# Patient Record
Sex: FEMALE | Race: OTHER | HISPANIC OR LATINO | URBAN - METROPOLITAN AREA
[De-identification: names, ages, dates, MRNs, and addresses within clinical notes are randomized per-mention and may not be internally consistent; named-entity substitution may affect disease eponyms.]

---

## 2023-03-17 ENCOUNTER — INPATIENT (INPATIENT)
Facility: HOSPITAL | Age: 83
LOS: 9 days | Discharge: NURSING, REHAB OR LONG-TERM CARE | End: 2023-03-27
Attending: INTERNAL MEDICINE | Admitting: INTERNAL MEDICINE
Payer: MEDICARE

## 2023-03-17 VITALS
HEART RATE: 75 BPM | RESPIRATION RATE: 16 BRPM | DIASTOLIC BLOOD PRESSURE: 80 MMHG | SYSTOLIC BLOOD PRESSURE: 150 MMHG | OXYGEN SATURATION: 94 % | TEMPERATURE: 98 F

## 2023-03-17 DIAGNOSIS — G93.41 METABOLIC ENCEPHALOPATHY: ICD-10-CM

## 2023-03-17 DIAGNOSIS — Z98.890 OTHER SPECIFIED POSTPROCEDURAL STATES: Chronic | ICD-10-CM
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
## 2023-03-17 DIAGNOSIS — R53.1 WEAKNESS: ICD-10-CM

## 2023-03-17 DIAGNOSIS — Z90.3 ACQUIRED ABSENCE OF STOMACH [PART OF]: Chronic | ICD-10-CM

## 2023-03-17 DIAGNOSIS — I10 ESSENTIAL (PRIMARY) HYPERTENSION: ICD-10-CM

## 2023-03-17 DIAGNOSIS — F41.9 ANXIETY DISORDER, UNSPECIFIED: ICD-10-CM

## 2023-03-17 DIAGNOSIS — Z29.9 ENCOUNTER FOR PROPHYLACTIC MEASURES, UNSPECIFIED: ICD-10-CM

## 2023-03-17 DIAGNOSIS — E87.1 HYPO-OSMOLALITY AND HYPONATREMIA: ICD-10-CM

## 2023-03-17 DIAGNOSIS — N39.0 URINARY TRACT INFECTION, SITE NOT SPECIFIED: ICD-10-CM

## 2023-03-17 LAB
ALBUMIN SERPL ELPH-MCNC: 4.1 G/DL — SIGNIFICANT CHANGE UP (ref 3.3–5)
ALP SERPL-CCNC: 43 U/L — SIGNIFICANT CHANGE UP (ref 40–120)
ALT FLD-CCNC: 33 U/L — SIGNIFICANT CHANGE UP (ref 4–33)
ANION GAP SERPL CALC-SCNC: 13 MMOL/L — SIGNIFICANT CHANGE UP (ref 7–14)
APPEARANCE UR: ABNORMAL
APTT BLD: 30.6 SEC — SIGNIFICANT CHANGE UP (ref 27–36.3)
AST SERPL-CCNC: 39 U/L — HIGH (ref 4–32)
BASOPHILS # BLD AUTO: 0.02 K/UL — SIGNIFICANT CHANGE UP (ref 0–0.2)
BASOPHILS NFR BLD AUTO: 0.2 % — SIGNIFICANT CHANGE UP (ref 0–2)
BILIRUB SERPL-MCNC: 0.8 MG/DL — SIGNIFICANT CHANGE UP (ref 0.2–1.2)
BILIRUB UR-MCNC: NEGATIVE — SIGNIFICANT CHANGE UP
BUN SERPL-MCNC: 14 MG/DL — SIGNIFICANT CHANGE UP (ref 7–23)
CALCIUM SERPL-MCNC: 9.5 MG/DL — SIGNIFICANT CHANGE UP (ref 8.4–10.5)
CHLORIDE SERPL-SCNC: 92 MMOL/L — LOW (ref 98–107)
CO2 SERPL-SCNC: 27 MMOL/L — SIGNIFICANT CHANGE UP (ref 22–31)
COLOR SPEC: YELLOW — SIGNIFICANT CHANGE UP
CREAT SERPL-MCNC: 0.75 MG/DL — SIGNIFICANT CHANGE UP (ref 0.5–1.3)
DIFF PNL FLD: NEGATIVE — SIGNIFICANT CHANGE UP
EGFR: 79 ML/MIN/1.73M2 — SIGNIFICANT CHANGE UP
EOSINOPHIL # BLD AUTO: 0.05 K/UL — SIGNIFICANT CHANGE UP (ref 0–0.5)
EOSINOPHIL NFR BLD AUTO: 0.4 % — SIGNIFICANT CHANGE UP (ref 0–6)
FLUAV AG NPH QL: SIGNIFICANT CHANGE UP
FLUBV AG NPH QL: SIGNIFICANT CHANGE UP
GLUCOSE SERPL-MCNC: 116 MG/DL — HIGH (ref 70–99)
GLUCOSE UR QL: NEGATIVE — SIGNIFICANT CHANGE UP
HCT VFR BLD CALC: 46.2 % — HIGH (ref 34.5–45)
HGB BLD-MCNC: 15.7 G/DL — HIGH (ref 11.5–15.5)
IANC: 8.86 K/UL — HIGH (ref 1.8–7.4)
IMM GRANULOCYTES NFR BLD AUTO: 0.3 % — SIGNIFICANT CHANGE UP (ref 0–0.9)
INR BLD: 1.02 RATIO — SIGNIFICANT CHANGE UP (ref 0.88–1.16)
KETONES UR-MCNC: NEGATIVE — SIGNIFICANT CHANGE UP
LEUKOCYTE ESTERASE UR-ACNC: ABNORMAL
LYMPHOCYTES # BLD AUTO: 1.91 K/UL — SIGNIFICANT CHANGE UP (ref 1–3.3)
LYMPHOCYTES # BLD AUTO: 16.5 % — SIGNIFICANT CHANGE UP (ref 13–44)
MCHC RBC-ENTMCNC: 31 PG — SIGNIFICANT CHANGE UP (ref 27–34)
MCHC RBC-ENTMCNC: 34 GM/DL — SIGNIFICANT CHANGE UP (ref 32–36)
MCV RBC AUTO: 91.1 FL — SIGNIFICANT CHANGE UP (ref 80–100)
MONOCYTES # BLD AUTO: 0.71 K/UL — SIGNIFICANT CHANGE UP (ref 0–0.9)
MONOCYTES NFR BLD AUTO: 6.1 % — SIGNIFICANT CHANGE UP (ref 2–14)
NEUTROPHILS # BLD AUTO: 8.86 K/UL — HIGH (ref 1.8–7.4)
NEUTROPHILS NFR BLD AUTO: 76.5 % — SIGNIFICANT CHANGE UP (ref 43–77)
NITRITE UR-MCNC: NEGATIVE — SIGNIFICANT CHANGE UP
NRBC # BLD: 0 /100 WBCS — SIGNIFICANT CHANGE UP (ref 0–0)
NRBC # FLD: 0 K/UL — SIGNIFICANT CHANGE UP (ref 0–0)
PH UR: 8 — SIGNIFICANT CHANGE UP (ref 5–8)
PLATELET # BLD AUTO: 242 K/UL — SIGNIFICANT CHANGE UP (ref 150–400)
POTASSIUM SERPL-MCNC: 4.1 MMOL/L — SIGNIFICANT CHANGE UP (ref 3.5–5.3)
POTASSIUM SERPL-SCNC: 4.1 MMOL/L — SIGNIFICANT CHANGE UP (ref 3.5–5.3)
PROT SERPL-MCNC: 7.5 G/DL — SIGNIFICANT CHANGE UP (ref 6–8.3)
PROT UR-MCNC: ABNORMAL
PROTHROM AB SERPL-ACNC: 11.8 SEC — SIGNIFICANT CHANGE UP (ref 10.5–13.4)
RBC # BLD: 5.07 M/UL — SIGNIFICANT CHANGE UP (ref 3.8–5.2)
RBC # FLD: 12.3 % — SIGNIFICANT CHANGE UP (ref 10.3–14.5)
RSV RNA NPH QL NAA+NON-PROBE: SIGNIFICANT CHANGE UP
SARS-COV-2 RNA SPEC QL NAA+PROBE: SIGNIFICANT CHANGE UP
SODIUM SERPL-SCNC: 132 MMOL/L — LOW (ref 135–145)
SP GR SPEC: 1.01 — SIGNIFICANT CHANGE UP (ref 1.01–1.05)
UROBILINOGEN FLD QL: SIGNIFICANT CHANGE UP
WBC # BLD: 11.58 K/UL — HIGH (ref 3.8–10.5)
WBC # FLD AUTO: 11.58 K/UL — HIGH (ref 3.8–10.5)

## 2023-03-17 PROCEDURE — 70450 CT HEAD/BRAIN W/O DYE: CPT | Mod: 26,MD

## 2023-03-17 PROCEDURE — 72125 CT NECK SPINE W/O DYE: CPT | Mod: 26,MA

## 2023-03-17 PROCEDURE — 72170 X-RAY EXAM OF PELVIS: CPT | Mod: 26

## 2023-03-17 PROCEDURE — 99285 EMERGENCY DEPT VISIT HI MDM: CPT

## 2023-03-17 PROCEDURE — 71046 X-RAY EXAM CHEST 2 VIEWS: CPT | Mod: 26

## 2023-03-17 RX ORDER — CLONAZEPAM 1 MG
0.5 TABLET ORAL THREE TIMES A DAY
Refills: 0 | Status: DISCONTINUED | OUTPATIENT
Start: 2023-03-17 | End: 2023-03-20

## 2023-03-17 RX ORDER — METOPROLOL TARTRATE 50 MG
200 TABLET ORAL DAILY
Refills: 0 | Status: DISCONTINUED | OUTPATIENT
Start: 2023-03-17 | End: 2023-03-27

## 2023-03-17 RX ORDER — CEFTRIAXONE 500 MG/1
1000 INJECTION, POWDER, FOR SOLUTION INTRAMUSCULAR; INTRAVENOUS ONCE
Refills: 0 | Status: COMPLETED | OUTPATIENT
Start: 2023-03-17 | End: 2023-03-17

## 2023-03-17 RX ORDER — SODIUM CHLORIDE 9 MG/ML
1000 INJECTION INTRAMUSCULAR; INTRAVENOUS; SUBCUTANEOUS
Refills: 0 | Status: DISCONTINUED | OUTPATIENT
Start: 2023-03-17 | End: 2023-03-23

## 2023-03-17 RX ORDER — FLUOXETINE HCL 10 MG
20 CAPSULE ORAL DAILY
Refills: 0 | Status: DISCONTINUED | OUTPATIENT
Start: 2023-03-17 | End: 2023-03-27

## 2023-03-17 RX ORDER — HYDRALAZINE HCL 50 MG
10 TABLET ORAL THREE TIMES A DAY
Refills: 0 | Status: DISCONTINUED | OUTPATIENT
Start: 2023-03-17 | End: 2023-03-19

## 2023-03-17 RX ORDER — ACETAMINOPHEN 500 MG
650 TABLET ORAL ONCE
Refills: 0 | Status: COMPLETED | OUTPATIENT
Start: 2023-03-17 | End: 2023-03-17

## 2023-03-17 RX ORDER — METOPROLOL TARTRATE 50 MG
200 TABLET ORAL DAILY
Refills: 0 | Status: DISCONTINUED | OUTPATIENT
Start: 2023-03-17 | End: 2023-03-17

## 2023-03-17 RX ORDER — CEFTRIAXONE 500 MG/1
1000 INJECTION, POWDER, FOR SOLUTION INTRAMUSCULAR; INTRAVENOUS EVERY 24 HOURS
Refills: 0 | Status: DISCONTINUED | OUTPATIENT
Start: 2023-03-17 | End: 2023-03-20

## 2023-03-17 RX ADMIN — CEFTRIAXONE 100 MILLIGRAM(S): 500 INJECTION, POWDER, FOR SOLUTION INTRAMUSCULAR; INTRAVENOUS at 16:07

## 2023-03-17 RX ADMIN — Medication 650 MILLIGRAM(S): at 19:42

## 2023-03-17 RX ADMIN — SODIUM CHLORIDE 100 MILLILITER(S): 9 INJECTION INTRAMUSCULAR; INTRAVENOUS; SUBCUTANEOUS at 23:45

## 2023-03-17 NOTE — CHART NOTE - NSCHARTNOTEFT_GEN_A_CORE
Patient Name: Dona Hand Date: 1940  Address: [address] [place] [address] [address] [place] [id number]: Female  Rx Written	Rx Dispensed	Drug	Quantity	Days Supply	Prescriber Name	Prescriber Macie #	Payment Method	Dispenser  03/06/2023	03/14/2023	clonazepam 0.5 mg tablet	90	30	Gaudencio Lomeli	[id number]	Insurance	[facility]  01/03/2023	01/13/2023	clonazepam 0.5 mg tablet	90	30	Gaudencio Lomeli	[id number]	Insurance	[facility]  11/27/2022	12/19/2022	clonazepam 0.5 mg tablet	90	30	LomeliGaudencio	[id number]	Insurance	[facility]  10/30/2022	11/09/2022	clonazepam 0.5 mg tablet	90	30	Lomeli, Gaudencio	[id number]	Insurance	[facility]  08/28/2022	09/19/2022	clonazepam 0.5 mg tablet	90	30	LomeliGaudencio	[id number]	Insurance	[facility]  07/24/2022	08/04/2022	clonazepam 0.5 mg tablet	90	30	LomeliGaudencio	[id number]	Insurance	[facility]  06/20/2022	07/07/2022	clonazepam 0.5 mg tablet	90	30	LomeliGaudencio	[id number]	Insurance	[facility]

## 2023-03-17 NOTE — H&P ADULT - PROBLEM SELECTOR PLAN 6
Likely related to acute infection vs hyponatremia.     Follow up in the am.    Consider psych evaluation

## 2023-03-17 NOTE — H&P ADULT - HISTORY OF PRESENT ILLNESS
History obtained from chart, patient, and daughter.   Patient is 82-year-old female former smoker with PMHx of memory impairment,  hypertension, anxiety, cataracts, hx of bladder cancer s/p surgical resection and chemo, breast cancer s/p lumpectomy, presenting with unwitnessed fall from home. Patient lives with  whom is bed bound. She was juan in by daughter the home health aide who takes care of the patient's  found the patient found on the floor.  Unable to quantify the amount of downtime however states that roughly is probably an hour as she checks the cameras daily. Patient has 4 children, but daughter states she has a signed hcp form.     Initial vital signs in the /80, HR 75, Temp 98.1, RR 16 Saturating 94% on room air. She had CT head with no infarct. CT cervical and xray without fractures.       Patient seen at bedside Alert Oriented to self only.

## 2023-03-17 NOTE — H&P ADULT - NSHPPHYSICALEXAM_GEN_ALL_CORE
PHYSICAL EXAM:  Vital Signs Last 24 Hrs  T(C): 36.8 (17 Mar 2023 22:21), Max: 37.2 (17 Mar 2023 21:21)  T(F): 98.2 (17 Mar 2023 22:21), Max: 99 (17 Mar 2023 21:21)  HR: 68 (17 Mar 2023 22:21) (68 - 75)  BP: 127/79 (17 Mar 2023 22:21) (127/55 - 166/70)  BP(mean): --  RR: 17 (17 Mar 2023 22:21) (16 - 18)  SpO2: 96% (17 Mar 2023 22:21) (94% - 99%)    Parameters below as of 17 Mar 2023 22:21  Patient On (Oxygen Delivery Method): room air      CONSTITUTIONAL: Elderly, obese   EYES: PERRLA; conjunctiva and sclera clear  ENMT: Moist oral mucosa, no pharyngeal injection or exudates; normal dentition  NECK: Supple, no palpable masses; no thyromegaly  RESPIRATORY: Normal respiratory effort; lungs are clear to auscultation bilaterally  CARDIOVASCULAR: Regular rate and rhythm, normal S1 and S2, no murmur/rub/gallop; No lower extremity edema; Peripheral pulses are 2+ bilaterally  ABDOMEN: Surgical scar. Nontender to palpation, normoactive bowel sounds, no rebound/guarding; N  MUSCULOSKELETAL:  Normal gait; no clubbing or cyanosis of digits; no joint swelling or tenderness to palpation  PSYCH: A+O to person,   NEUROLOGY: no gross sensory deficits   SKIN: No rashes; no palpable lesions

## 2023-03-17 NOTE — H&P ADULT - PROBLEM SELECTOR PLAN 7
DVT prophylaxis- Lovenox daily   -Regular diet   - PT referral   Daughter Janice Vidal states has a hcp that the patient filled out in the past assigning her. Patient verbalized to her that she does not want to be intubated or have chest compression. Patient has 4 children, two of which live in [place]. Goals of care follow up in the am.

## 2023-03-17 NOTE — ED ADULT NURSE NOTE - NSIMPLEMENTINTERV_GEN_ALL_ED
Implemented All Fall Risk Interventions:  [place] to call system. Call bell, personal items and telephone within reach. Instruct patient to call for assistance. Room bathroom lighting operational. Non-slip footwear when patient is off stretcher. Physically safe environment: no spills, clutter or unnecessary equipment. Stretcher in lowest position, wheels locked, appropriate side rails in place. Provide visual cue, wrist band, yellow gown, etc. Monitor gait and stability. Monitor for mental status changes and reorient to person, place, and time. Review medications for side effects contributing to fall risk. Reinforce activity limits and safety measures with patient and family.

## 2023-03-17 NOTE — ED PROVIDER NOTE - ATTENDING CONTRIBUTION TO CARE
DR. SIMS, ATTENDING MD-  I performed a face to face bedside interview with the patient regarding history of present illness, review of symptoms and past medical history. I completed an independent physical exam.  I have discussed the patient's plan of care with the resident.   Documentation as above in the note.    82-year-old female history of emphysema anxiety brought in by EMS after unwitnessed fall at home.  Per family, patient has been having worsening dizziness over the past few months.  Today found down on ground.  Unable to ambulate today due to global weakness.  Will evaluate for electrolyte abnormality anemia occult infection intracranial hemorrhage.  Obtain EKG CBC CMP urinalysis urine culture chest x-ray pelvic x-ray CT head and C-spine viral swab will need admission for further care and evaluation.

## 2023-03-17 NOTE — H&P ADULT - NSICDXPASTSURGICALHX_GEN_ALL_CORE_FT
PAST SURGICAL HISTORY:  H/O resection of stomach     History of bladder surgery     S/P breast lumpectomy

## 2023-03-17 NOTE — ED PROVIDER NOTE - PHYSICAL EXAMINATION
Physical Exam:    Gen: NAD, AOx3  Head: NCAT  HEENT: EOMI, PEERLA  Lung: CTAB, no respiratory distress, no wheezes/rhonchi/rales B/L, speaking in full sentences  CV: RRR, no murmurs, rubs or gallops  Abd: soft, NT, ND, no guarding, no rigidity, no rebound tenderness, no CVA tenderness   MSK: no visible deformities, ROM normal in UE/LE, no back pain  Neuro: No focal sensory or motor deficits  Skin: Warm, well perfused, no rash, no leg swelling  Psych: normal affect, calm Physical Exam:    Gen: NAD, AOx3  Head: NCAT  HEENT: EOMI, PEERLA  Lung: CTAB  CV: RRR, no murmurs, rubs or gallops  Abd: soft, NT, ND, no guarding, no rigidity, no rebound tenderness, no CVA tenderness   MSK: no visible deformities, ROM normal in UE/LE, no back pain, moving all four extremities   Neuro: No focal sensory or motor deficits  Skin: Warm, well perfused, no rash, no leg swelling

## 2023-03-17 NOTE — PATIENT PROFILE ADULT - DOES PATIENT HAVE ADVANCE DIRECTIVE
Daughter Tori will come in the morning to fill out a HCP form/No Daughter Tori will come in the morning to fill out a HCP form/Yes

## 2023-03-17 NOTE — H&P ADULT - NSHPLABSRESULTS_GEN_ALL_CORE
XRAY PERSONALLY REVIEWED.    Xray Chest 2 Views PA/Lat (23 @ 14:08) >  FINDINGS:  The heart is normal in size.  The lungs are clear. Tenting of the left hemidiaphragm, likely   representing a focus of atelectasis.  There is no pneumothorax or pleural effusion.  There are no acute osseous abnormalities.    IMPRESSION: No acute traumatic findings.    CT Head No Cont (23 @ 17:37) >    FINDINGS:  Head:  There is mild diffuse parenchymal volume loss. There are areas of low   attenuation in the periventricular white matter likely related to mild   chronic microvascular ischemic changes.    There is no acute intracranial hemorrhage, parenchymal mass, mass effect   or midline shift. There is no acute territorial infarct. There is no   hydrocephalus. Partial empty sella noted    The cranium is intact.    Mucosal thickening sphenoid sinus    Cervical spine:  The vertebral body heights and alignment are maintained. There is no   acute fracture.    There is no prevertebral soft tissue swelling. The odontoid is intact.    Evaluation of the spinal canal is limited on a CT exam.  Multilevel   degenerative changes noted resulting in multilevel spinal canal stenosis   and neural foraminal narrowing.     Xray Pelvis AP only (23 @ 14:09) >    FINDINGS:    OSSEOUS STRUCTURES    Fractures:  None.    VISUALIZED LUMBAR SPINE: Mild to moderate lower lumbar degenerative disc   disease appears to be present.    HIP AND PELVIC JOINTS    Joint Space(s):  Maintained.    IMPRESSION:  1.  No acute osseous abnormalities.                      15.7   11.58 )-----------( 242      ( 17 Mar 2023 13:20 )             46.2     Hgb Trend: 15.7<--  03-17    132<L>  |  92<L>  |  14  ----------------------------<  116<H>  4.1   |  27  |  0.75    Ca    9.5      17 Mar 2023 13:20    TPro  7.5  /  Alb  4.1  /  TBili  0.8  /  DBili  x   /  AST  39<H>  /  ALT  33  /  AlkPhos  43      Creatinine Trend: 0.75<--  PT/INR - ( 17 Mar 2023 13:20 )   PT: 11.8 sec;   INR: 1.02 ratio         PTT - ( 17 Mar 2023 13:20 )  PTT:30.6 sec  CARDIAC MARKERS ( 17 Mar 2023 13:20 )  x     / x     / 73 U/L / x     / x        Urinalysis Basic - ( 17 Mar 2023 11:54 )  Color: Yellow / Appearance: Slightly Turbid / S.012 / pH: x  Gluc: x / Ketone: Negative  / Bili: Negative / Urobili: <2 mg/dL   Blood: x / Protein: Trace / Nitrite: Negative   Leuk Esterase: Large / RBC: 2 /HPF / WBC 26 /HPF   Sq Epi: x / Non Sq Epi: 13 /HPF / Bacteria: Moderate

## 2023-03-17 NOTE — ED PROVIDER NOTE - CLINICAL SUMMARY MEDICAL DECISION MAKING FREE TEXT BOX
82-year-old female history of hypertension, anxiety, emphysema, cataracts presenting with unwitnessed fall from home.  Patient Salvadorean-speaking and accompanied by her daughter Zohreh Vidal who is giving most of the history.  States that this morning the home health aide who takes care of the patient's  found the patient found on the floor.  Unable to quantify the amount of downtime however states that roughly is probably an hour as she checks the cameras daily.  Patient is not endorsing any pain or dizziness at this time.  States that she has felt dizzy intermittently for months, worse with positional movements with a sensation of the room spinning.     Patient with stable vital signs, afebrile. No visible deformities on exam.  Plan for cbc, cmp, coags, CK. Will obtain CXR and Xray Pelvis. Will obtain CT Head and C-spine r/o ICH and fx  DDx includes but not limited to syncope secondary to infectious etiology vs. electrolyte derangement vs. arrhythmia vs. intracerebral pathology

## 2023-03-17 NOTE — ED ADULT NURSE NOTE - OBJECTIVE STATEMENT
Received pt into [address] in [facility] , accompanied by daughter. Pt is primarily German speaking. Able to speak very little English, giving daughter consent to translate. Pt states was at the sink this morning and felt dizzy and passed out. Denies any pain, denies head pain but c/o dizziness. Pt is A/o x 2-3. Unsure of what year it is but able to state her  and answer questions appropriately. Patient's daughter states pt has been forgetful throughout the last year. Daughter also stating pt has very poor eye sight and is hard of hearing. No facial droop noted, no arm drift or unifocal weakness to lower or upper extremities. Daughter states pt is at baseline mentation. Neuros intact. Pt lives at home with  with few hours of home aide care but last night did not have aide stay over. Birthmark noted to right side face and left hip area but no bruising or skin issues noted. Pt undressed, awaiting eval by MD.

## 2023-03-17 NOTE — ED ADULT NURSE NOTE - RELATIONSHIP TO PATIENT
Patient called, left a voicemail wanting to talk to Dr. Farah or her Nurse. She states she does not wish to take the tegratol anymore because it makes her too tired she can't even clean her house that would take the latuda but not both of them. She states it's her body so she should be able to make decisions. She also states there is another medication missing with a dose change. She would like someone to call her to discuss this matter with her medications. Please call and advise.    daughter

## 2023-03-17 NOTE — ED ADULT TRIAGE NOTE - CHIEF COMPLAINT QUOTE
Pt arrives via EMS from home s/p unwitnessed fall this AM. Aide found pt on the floor. Neg A/C use. Denies head pain. Pt has been c/o dizziness x several months. Daughter states that pt is at baseline mental status, a&ox2-3. PMH: HTN, anxiety, emphysema, cataracts.

## 2023-03-17 NOTE — PATIENT PROFILE ADULT - FUNCTIONAL ASSESSMENT - BASIC MOBILITY 6.
2-calculated by average/Not able to assess (calculate score using [facility] averaging method)

## 2023-03-17 NOTE — H&P ADULT - ASSESSMENT
Patient is 82-year-old female former smoker with PMHx of memory impairment,  hypertension, anxiety, cataracts, hx of bladder cancer s/p surgical resection and chemo, breast cancer s/p lumpectomy, presenting with unwitnessed fall from home.

## 2023-03-17 NOTE — ED PROVIDER NOTE - OBJECTIVE STATEMENT
82-year-old female history of hypertension, anxiety, emphysema, cataracts presenting with unwitnessed fall from home.  Patient Bhutanese-speaking and accompanied by her daughter Zohreh Vidal who is giving most of the history.  States that this morning the home health aide who takes care of the patient's  found the patient found on the floor.  Unable to quantify the amount of downtime however states that roughly is probably an hour as she checks the cameras daily.  Patient is not endorsing any pain at this time.  States that she has felt dizzy intermittently for months, worse with positional movements.  Daughter states when patient gets out of bed she feels dizzy.  Denies any vomiting, diarrhea.  Unable to test that she had her head.  Denies any chest pain, shortness of breath, abdominal pain, lower extremity swelling.  Patient is moving all 4 extremities. 82-year-old female history of hypertension, anxiety, emphysema, cataracts presenting with unwitnessed fall from home.  Patient Maltese-speaking and accompanied by her daughter Zohreh Vidal who is giving most of the history.  States that this morning the home health aide who takes care of the patient's  found the patient found on the floor.  Unable to quantify the amount of downtime however states that roughly is probably an hour as she checks the cameras daily.  Patient is not endorsing any pain or dizziness at this time.  States that she has felt dizzy intermittently for months, worse with positional movements with a sensation of the room spinning.  Daughter states when patient gets out of bed she feels dizzy.  Denies any vomiting, diarrhea.  Unable to test that she had her head.  Denies any chest pain, shortness of breath, abdominal pain, lower extremity swelling.  Patient is moving all 4 extremities. 82-year-old female history of hypertension, anxiety, emphysema, cataracts presenting with unwitnessed fall from home.  Patient Bruneian-speaking and accompanied by her daughter Zohreh Vidal who is giving most of the history.  States that this morning the home health aide who takes care of the patient's  found the patient found on the floor.  Unable to quantify the amount of downtime however states that roughly is probably an hour as she checks the cameras daily.  Patient is not endorsing any pain or dizziness at this time.  States that she has felt dizzy intermittently for months, worse with positional movements with a sensation of the room spinning.  Daughter states when patient gets out of bed she feels dizzy.  Denies any vomiting, diarrhea.  Unable to test that she had her head.  Denies any chest pain, shortness of breath, abdominal pain, lower extremity swelling.  Patient is moving all 4 extremities. Patient takes Klonopin TID. States dizziness has persisted for a month, and patient has been ambulating with a cane. 82-year-old female history of hypertension, anxiety, emphysema, cataracts presenting with unwitnessed fall from home.  Patient Filipino-speaking and accompanied by her daughter Zohreh Vidal who is giving most of the history.  States that this morning the home health aide who takes care of the patient's  found the patient found on the floor.  Unable to quantify the amount of downtime however states that roughly is probably an hour as she checks the cameras daily.  Patient is not endorsing any pain or dizziness at this time.  States that she has felt dizzy intermittently for months, worse with positional movements with a sensation of the room spinning.  Daughter states when patient gets out of bed she feels dizzy.  Denies any vomiting, diarrhea.  Unable to test that she had her head.  Denies any chest pain, shortness of breath, abdominal pain, lower extremity swelling.  Patient is moving all 4 extremities. Patient takes Klonopin TID. States dizziness has persisted for a month, and patient has been ambulating with a cane.    Home meds:   Fluoxetine 20 QD  Hydralzine 10 TID  Metoprolol 200 once in morning  Trazodone 50 QHS

## 2023-03-17 NOTE — H&P ADULT - NSHPSOCIALHISTORY_GEN_ALL_CORE
, lives with . She is former smoker, and beer drinker. No illicit drugs. Has cane, walker and wheelchair at home.

## 2023-03-17 NOTE — H&P ADULT - PROBLEM SELECTOR PLAN 1
Unwittnessed fall at home. Imaging without hemorrhage or fracture.   PT referral. Fall precuations.     Possible related to hyponatremia vs UTI. Unwitnessed fall at home. Imaging without hemorrhage or fracture.     PT referral for assessment. Likely will need BARTOLO.      Fall precuations.     Possible related to hyponatremia vs UTI which is being treated as below.

## 2023-03-18 LAB
ANION GAP SERPL CALC-SCNC: 15 MMOL/L — HIGH (ref 7–14)
BUN SERPL-MCNC: 18 MG/DL — SIGNIFICANT CHANGE UP (ref 7–23)
CALCIUM SERPL-MCNC: 9 MG/DL — SIGNIFICANT CHANGE UP (ref 8.4–10.5)
CHLORIDE SERPL-SCNC: 92 MMOL/L — LOW (ref 98–107)
CO2 SERPL-SCNC: 25 MMOL/L — SIGNIFICANT CHANGE UP (ref 22–31)
CREAT SERPL-MCNC: 0.84 MG/DL — SIGNIFICANT CHANGE UP (ref 0.5–1.3)
EGFR: 69 ML/MIN/1.73M2 — SIGNIFICANT CHANGE UP
GLUCOSE SERPL-MCNC: 107 MG/DL — HIGH (ref 70–99)
HCT VFR BLD CALC: 42.7 % — SIGNIFICANT CHANGE UP (ref 34.5–45)
HGB BLD-MCNC: 14.4 G/DL — SIGNIFICANT CHANGE UP (ref 11.5–15.5)
MAGNESIUM SERPL-MCNC: 2 MG/DL — SIGNIFICANT CHANGE UP (ref 1.6–2.6)
MCHC RBC-ENTMCNC: 31 PG — SIGNIFICANT CHANGE UP (ref 27–34)
MCHC RBC-ENTMCNC: 33.7 GM/DL — SIGNIFICANT CHANGE UP (ref 32–36)
MCV RBC AUTO: 91.8 FL — SIGNIFICANT CHANGE UP (ref 80–100)
MRSA PCR RESULT.: SIGNIFICANT CHANGE UP
NRBC # BLD: 0 /100 WBCS — SIGNIFICANT CHANGE UP (ref 0–0)
NRBC # FLD: 0 K/UL — SIGNIFICANT CHANGE UP (ref 0–0)
PHOSPHATE SERPL-MCNC: 2.5 MG/DL — SIGNIFICANT CHANGE UP (ref 2.5–4.5)
PLATELET # BLD AUTO: 250 K/UL — SIGNIFICANT CHANGE UP (ref 150–400)
POTASSIUM SERPL-MCNC: 3.3 MMOL/L — LOW (ref 3.5–5.3)
POTASSIUM SERPL-SCNC: 3.3 MMOL/L — LOW (ref 3.5–5.3)
RBC # BLD: 4.65 M/UL — SIGNIFICANT CHANGE UP (ref 3.8–5.2)
RBC # FLD: 12.1 % — SIGNIFICANT CHANGE UP (ref 10.3–14.5)
S AUREUS DNA NOSE QL NAA+PROBE: SIGNIFICANT CHANGE UP
SODIUM SERPL-SCNC: 132 MMOL/L — LOW (ref 135–145)
TSH SERPL-MCNC: 2.28 UIU/ML — SIGNIFICANT CHANGE UP (ref 0.27–4.2)
WBC # BLD: 10.98 K/UL — HIGH (ref 3.8–10.5)
WBC # FLD AUTO: 10.98 K/UL — HIGH (ref 3.8–10.5)

## 2023-03-18 PROCEDURE — 99223 1ST HOSP IP/OBS HIGH 75: CPT

## 2023-03-18 PROCEDURE — 12345: CPT | Mod: NC

## 2023-03-18 RX ORDER — ACETAMINOPHEN 500 MG
650 TABLET ORAL EVERY 6 HOURS
Refills: 0 | Status: DISCONTINUED | OUTPATIENT
Start: 2023-03-18 | End: 2023-03-27

## 2023-03-18 RX ORDER — HALOPERIDOL DECANOATE 100 MG/ML
1 INJECTION INTRAMUSCULAR ONCE
Refills: 0 | Status: COMPLETED | OUTPATIENT
Start: 2023-03-18 | End: 2023-03-18

## 2023-03-18 RX ORDER — CHLORHEXIDINE GLUCONATE 213 G/1000ML
1 SOLUTION TOPICAL DAILY
Refills: 0 | Status: DISCONTINUED | OUTPATIENT
Start: 2023-03-18 | End: 2023-03-27

## 2023-03-18 RX ORDER — IPRATROPIUM/ALBUTEROL SULFATE 18-103MCG
3 AEROSOL WITH ADAPTER (GRAM) INHALATION ONCE
Refills: 0 | Status: COMPLETED | OUTPATIENT
Start: 2023-03-18 | End: 2023-03-18

## 2023-03-18 RX ORDER — POTASSIUM CHLORIDE 20 MEQ
40 PACKET (EA) ORAL ONCE
Refills: 0 | Status: COMPLETED | OUTPATIENT
Start: 2023-03-18 | End: 2023-03-18

## 2023-03-18 RX ORDER — HALOPERIDOL DECANOATE 100 MG/ML
1 INJECTION INTRAMUSCULAR ONCE
Refills: 0 | Status: DISCONTINUED | OUTPATIENT
Start: 2023-03-18 | End: 2023-03-18

## 2023-03-18 RX ADMIN — CHLORHEXIDINE GLUCONATE 1 APPLICATION(S): 213 SOLUTION TOPICAL at 14:29

## 2023-03-18 RX ADMIN — Medication 20 MILLIGRAM(S): at 11:51

## 2023-03-18 RX ADMIN — Medication 650 MILLIGRAM(S): at 19:36

## 2023-03-18 RX ADMIN — CEFTRIAXONE 100 MILLIGRAM(S): 500 INJECTION, POWDER, FOR SOLUTION INTRAMUSCULAR; INTRAVENOUS at 11:51

## 2023-03-18 RX ADMIN — Medication 1 TABLET(S): at 11:51

## 2023-03-18 RX ADMIN — HALOPERIDOL DECANOATE 1 MILLIGRAM(S): 100 INJECTION INTRAMUSCULAR at 03:59

## 2023-03-18 RX ADMIN — Medication 0.5 MILLIGRAM(S): at 19:36

## 2023-03-18 RX ADMIN — Medication 200 MILLIGRAM(S): at 05:54

## 2023-03-18 RX ADMIN — Medication 3 MILLILITER(S): at 20:32

## 2023-03-18 RX ADMIN — Medication 40 MILLIEQUIVALENT(S): at 09:48

## 2023-03-18 RX ADMIN — Medication 650 MILLIGRAM(S): at 20:36

## 2023-03-18 RX ADMIN — Medication 0.5 MILLIGRAM(S): at 10:37

## 2023-03-18 NOTE — PROGRESS NOTE ADULT - PROBLEM SELECTOR PLAN 7
DVT prophylaxis- Lovenox daily   -Regular diet   - PT referral     Daughter Janice Vidal states has a hcp that the patient filled out in the past assigning her. Patient verbalized to her that she does not want to be intubated or have chest compression. Patient has 4 children, two of which live in [place]. Continue further [facility] discussion

## 2023-03-18 NOTE — PROGRESS NOTE ADULT - PROBLEM SELECTOR PLAN 1
Unwitnessed fall at home. Imaging without hemorrhage or fracture.     PT recommend rehab.  Fall precautions.     Possible related to hyponatremia vs UTI which is being treated as below.

## 2023-03-18 NOTE — PHYSICAL THERAPY INITIAL EVALUATION ADULT - LEVEL OF INDEPENDENCE: SIT/STAND, REHAB EVAL
due to poor sitting balance and patient wanting to lay back down, awaiting food has not eaten yet./unable to perform

## 2023-03-18 NOTE — PHYSICAL THERAPY INITIAL EVALUATION ADULT - ADDITIONAL COMMENTS
Patient is inconsistent historian, please see care coordination note to confirm details. patient lives with  per chart who is bedbound. Daughter is the patients husbands home health aide. Patient has cane, walker and wheelchair at home.

## 2023-03-18 NOTE — PHYSICAL THERAPY INITIAL EVALUATION ADULT - PERTINENT HX OF CURRENT PROBLEM, REHAB EVAL
Patient is 82-year-old female former smoker with PMHx of memory impairment,  hypertension, anxiety, cataracts, hx of bladder cancer s/p surgical resection and chemo, breast cancer s/p lumpectomy, presenting with unwitnessed fall from home. Patient lives with  whom is bed bound. She was juan in by daughter the home health aide who takes care of the patient's  found the patient found on the floor.  Unable to quantify the amount of downtime however states that roughly is probably an hour as she checks the cameras daily. Patient has 4 children, but daughter states she has a signed hcp form.

## 2023-03-19 LAB
ANION GAP SERPL CALC-SCNC: 12 MMOL/L — SIGNIFICANT CHANGE UP (ref 7–14)
BASOPHILS # BLD AUTO: 0.02 K/UL — SIGNIFICANT CHANGE UP (ref 0–0.2)
BASOPHILS NFR BLD AUTO: 0.3 % — SIGNIFICANT CHANGE UP (ref 0–2)
BUN SERPL-MCNC: 16 MG/DL — SIGNIFICANT CHANGE UP (ref 7–23)
CALCIUM SERPL-MCNC: 9 MG/DL — SIGNIFICANT CHANGE UP (ref 8.4–10.5)
CHLORIDE SERPL-SCNC: 95 MMOL/L — LOW (ref 98–107)
CO2 SERPL-SCNC: 25 MMOL/L — SIGNIFICANT CHANGE UP (ref 22–31)
CREAT SERPL-MCNC: 0.86 MG/DL — SIGNIFICANT CHANGE UP (ref 0.5–1.3)
CULTURE RESULTS: SIGNIFICANT CHANGE UP
EGFR: 67 ML/MIN/1.73M2 — SIGNIFICANT CHANGE UP
EOSINOPHIL # BLD AUTO: 0.13 K/UL — SIGNIFICANT CHANGE UP (ref 0–0.5)
EOSINOPHIL NFR BLD AUTO: 1.8 % — SIGNIFICANT CHANGE UP (ref 0–6)
GLUCOSE SERPL-MCNC: 81 MG/DL — SIGNIFICANT CHANGE UP (ref 70–99)
HCT VFR BLD CALC: 42.5 % — SIGNIFICANT CHANGE UP (ref 34.5–45)
HGB BLD-MCNC: 14.4 G/DL — SIGNIFICANT CHANGE UP (ref 11.5–15.5)
IANC: 3.93 K/UL — SIGNIFICANT CHANGE UP (ref 1.8–7.4)
IMM GRANULOCYTES NFR BLD AUTO: 0.3 % — SIGNIFICANT CHANGE UP (ref 0–0.9)
LYMPHOCYTES # BLD AUTO: 2.33 K/UL — SIGNIFICANT CHANGE UP (ref 1–3.3)
LYMPHOCYTES # BLD AUTO: 32.8 % — SIGNIFICANT CHANGE UP (ref 13–44)
MAGNESIUM SERPL-MCNC: 2.1 MG/DL — SIGNIFICANT CHANGE UP (ref 1.6–2.6)
MCHC RBC-ENTMCNC: 31.2 PG — SIGNIFICANT CHANGE UP (ref 27–34)
MCHC RBC-ENTMCNC: 33.9 GM/DL — SIGNIFICANT CHANGE UP (ref 32–36)
MCV RBC AUTO: 92 FL — SIGNIFICANT CHANGE UP (ref 80–100)
MONOCYTES # BLD AUTO: 0.67 K/UL — SIGNIFICANT CHANGE UP (ref 0–0.9)
MONOCYTES NFR BLD AUTO: 9.4 % — SIGNIFICANT CHANGE UP (ref 2–14)
NEUTROPHILS # BLD AUTO: 3.93 K/UL — SIGNIFICANT CHANGE UP (ref 1.8–7.4)
NEUTROPHILS NFR BLD AUTO: 55.4 % — SIGNIFICANT CHANGE UP (ref 43–77)
NRBC # BLD: 0 /100 WBCS — SIGNIFICANT CHANGE UP (ref 0–0)
NRBC # FLD: 0 K/UL — SIGNIFICANT CHANGE UP (ref 0–0)
PHOSPHATE SERPL-MCNC: 2.5 MG/DL — SIGNIFICANT CHANGE UP (ref 2.5–4.5)
PLATELET # BLD AUTO: 212 K/UL — SIGNIFICANT CHANGE UP (ref 150–400)
POTASSIUM SERPL-MCNC: 3.7 MMOL/L — SIGNIFICANT CHANGE UP (ref 3.5–5.3)
POTASSIUM SERPL-SCNC: 3.7 MMOL/L — SIGNIFICANT CHANGE UP (ref 3.5–5.3)
RBC # BLD: 4.62 M/UL — SIGNIFICANT CHANGE UP (ref 3.8–5.2)
RBC # FLD: 12.3 % — SIGNIFICANT CHANGE UP (ref 10.3–14.5)
SODIUM SERPL-SCNC: 132 MMOL/L — LOW (ref 135–145)
SPECIMEN SOURCE: SIGNIFICANT CHANGE UP
WBC # BLD: 7.1 K/UL — SIGNIFICANT CHANGE UP (ref 3.8–10.5)
WBC # FLD AUTO: 7.1 K/UL — SIGNIFICANT CHANGE UP (ref 3.8–10.5)

## 2023-03-19 PROCEDURE — 99232 SBSQ HOSP IP/OBS MODERATE 35: CPT

## 2023-03-19 RX ORDER — HYDRALAZINE HCL 50 MG
10 TABLET ORAL ONCE
Refills: 0 | Status: COMPLETED | OUTPATIENT
Start: 2023-03-19 | End: 2023-03-19

## 2023-03-19 RX ORDER — HYDRALAZINE HCL 50 MG
5 TABLET ORAL ONCE
Refills: 0 | Status: COMPLETED | OUTPATIENT
Start: 2023-03-19 | End: 2023-03-19

## 2023-03-19 RX ORDER — MECLIZINE HCL 12.5 MG
12.5 TABLET ORAL ONCE
Refills: 0 | Status: COMPLETED | OUTPATIENT
Start: 2023-03-19 | End: 2023-03-20

## 2023-03-19 RX ORDER — ALBUTEROL 90 UG/1
2 AEROSOL, METERED ORAL EVERY 6 HOURS
Refills: 0 | Status: DISCONTINUED | OUTPATIENT
Start: 2023-03-19 | End: 2023-03-27

## 2023-03-19 RX ORDER — LABETALOL HCL 100 MG
2.5 TABLET ORAL ONCE
Refills: 0 | Status: COMPLETED | OUTPATIENT
Start: 2023-03-19 | End: 2023-03-19

## 2023-03-19 RX ORDER — HYDRALAZINE HCL 50 MG
25 TABLET ORAL THREE TIMES A DAY
Refills: 0 | Status: DISCONTINUED | OUTPATIENT
Start: 2023-03-19 | End: 2023-03-21

## 2023-03-19 RX ADMIN — Medication 650 MILLIGRAM(S): at 20:37

## 2023-03-19 RX ADMIN — CHLORHEXIDINE GLUCONATE 1 APPLICATION(S): 213 SOLUTION TOPICAL at 11:04

## 2023-03-19 RX ADMIN — Medication 2.5 MILLIGRAM(S): at 23:56

## 2023-03-19 RX ADMIN — Medication 25 MILLIGRAM(S): at 22:26

## 2023-03-19 RX ADMIN — Medication 5 MILLIGRAM(S): at 19:15

## 2023-03-19 RX ADMIN — Medication 20 MILLIGRAM(S): at 11:05

## 2023-03-19 RX ADMIN — Medication 650 MILLIGRAM(S): at 14:29

## 2023-03-19 RX ADMIN — Medication 200 MILLIGRAM(S): at 06:01

## 2023-03-19 RX ADMIN — Medication 650 MILLIGRAM(S): at 21:23

## 2023-03-19 RX ADMIN — Medication 0.5 MILLIGRAM(S): at 10:20

## 2023-03-19 RX ADMIN — Medication 650 MILLIGRAM(S): at 13:59

## 2023-03-19 RX ADMIN — Medication 25 MILLIGRAM(S): at 14:01

## 2023-03-19 RX ADMIN — Medication 1 TABLET(S): at 11:05

## 2023-03-19 RX ADMIN — CEFTRIAXONE 100 MILLIGRAM(S): 500 INJECTION, POWDER, FOR SOLUTION INTRAMUSCULAR; INTRAVENOUS at 11:09

## 2023-03-19 RX ADMIN — Medication 10 MILLIGRAM(S): at 09:20

## 2023-03-19 RX ADMIN — ALBUTEROL 2 PUFF(S): 90 AEROSOL, METERED ORAL at 14:17

## 2023-03-19 NOTE — PROGRESS NOTE ADULT - PROBLEM SELECTOR PLAN 1
Unwitnessed fall at home. Imaging without hemorrhage or fracture.   Check orthostatic vital signs in setting of recurrent dizziness.    PT recommend rehab. daughter Tori would prefer to take her home.  Fall precautions.     Possible related to hyponatremia vs UTI which is being treated as below.

## 2023-03-19 NOTE — PROGRESS NOTE ADULT - PROBLEM SELECTOR PLAN 7
DVT prophylaxis- Lovenox daily   -Regular diet   - PT referral     Daughter Janice Vidal states has a hcp formed that the patient filled out in the past assigning her. Patient verbalized to her that she does not want to be intubated or have chest compression. Patient has 4 children, two of which live in [place]. Continue further [facility] discussion

## 2023-03-20 LAB
ANION GAP SERPL CALC-SCNC: 11 MMOL/L — SIGNIFICANT CHANGE UP (ref 7–14)
ANION GAP SERPL CALC-SCNC: 17 MMOL/L — HIGH (ref 7–14)
BASOPHILS # BLD AUTO: 0.01 K/UL — SIGNIFICANT CHANGE UP (ref 0–0.2)
BASOPHILS NFR BLD AUTO: 0.1 % — SIGNIFICANT CHANGE UP (ref 0–2)
BUN SERPL-MCNC: 13 MG/DL — SIGNIFICANT CHANGE UP (ref 7–23)
BUN SERPL-MCNC: 15 MG/DL — SIGNIFICANT CHANGE UP (ref 7–23)
CALCIUM SERPL-MCNC: 9 MG/DL — SIGNIFICANT CHANGE UP (ref 8.4–10.5)
CALCIUM SERPL-MCNC: 9.1 MG/DL — SIGNIFICANT CHANGE UP (ref 8.4–10.5)
CHLORIDE SERPL-SCNC: 89 MMOL/L — LOW (ref 98–107)
CHLORIDE SERPL-SCNC: 90 MMOL/L — LOW (ref 98–107)
CO2 SERPL-SCNC: 18 MMOL/L — LOW (ref 22–31)
CO2 SERPL-SCNC: 29 MMOL/L — SIGNIFICANT CHANGE UP (ref 22–31)
CREAT SERPL-MCNC: 0.67 MG/DL — SIGNIFICANT CHANGE UP (ref 0.5–1.3)
CREAT SERPL-MCNC: 0.69 MG/DL — SIGNIFICANT CHANGE UP (ref 0.5–1.3)
EGFR: 86 ML/MIN/1.73M2 — SIGNIFICANT CHANGE UP
EGFR: 87 ML/MIN/1.73M2 — SIGNIFICANT CHANGE UP
EOSINOPHIL # BLD AUTO: 0.1 K/UL — SIGNIFICANT CHANGE UP (ref 0–0.5)
EOSINOPHIL NFR BLD AUTO: 0.9 % — SIGNIFICANT CHANGE UP (ref 0–6)
GLUCOSE SERPL-MCNC: 116 MG/DL — HIGH (ref 70–99)
GLUCOSE SERPL-MCNC: 90 MG/DL — SIGNIFICANT CHANGE UP (ref 70–99)
HCT VFR BLD CALC: 43.5 % — SIGNIFICANT CHANGE UP (ref 34.5–45)
HGB BLD-MCNC: 15.1 G/DL — SIGNIFICANT CHANGE UP (ref 11.5–15.5)
IANC: 7.27 K/UL — SIGNIFICANT CHANGE UP (ref 1.8–7.4)
IMM GRANULOCYTES NFR BLD AUTO: 0.4 % — SIGNIFICANT CHANGE UP (ref 0–0.9)
LYMPHOCYTES # BLD AUTO: 2.83 K/UL — SIGNIFICANT CHANGE UP (ref 1–3.3)
LYMPHOCYTES # BLD AUTO: 25.6 % — SIGNIFICANT CHANGE UP (ref 13–44)
MAGNESIUM SERPL-MCNC: 2.1 MG/DL — SIGNIFICANT CHANGE UP (ref 1.6–2.6)
MAGNESIUM SERPL-MCNC: 2.1 MG/DL — SIGNIFICANT CHANGE UP (ref 1.6–2.6)
MCHC RBC-ENTMCNC: 31.7 PG — SIGNIFICANT CHANGE UP (ref 27–34)
MCHC RBC-ENTMCNC: 34.7 GM/DL — SIGNIFICANT CHANGE UP (ref 32–36)
MCV RBC AUTO: 91.4 FL — SIGNIFICANT CHANGE UP (ref 80–100)
MONOCYTES # BLD AUTO: 0.79 K/UL — SIGNIFICANT CHANGE UP (ref 0–0.9)
MONOCYTES NFR BLD AUTO: 7.2 % — SIGNIFICANT CHANGE UP (ref 2–14)
NEUTROPHILS # BLD AUTO: 7.27 K/UL — SIGNIFICANT CHANGE UP (ref 1.8–7.4)
NEUTROPHILS NFR BLD AUTO: 65.8 % — SIGNIFICANT CHANGE UP (ref 43–77)
NRBC # BLD: 0 /100 WBCS — SIGNIFICANT CHANGE UP (ref 0–0)
NRBC # FLD: 0 K/UL — SIGNIFICANT CHANGE UP (ref 0–0)
OSMOLALITY UR: 505 MOSM/KG — SIGNIFICANT CHANGE UP (ref 50–1200)
PHOSPHATE SERPL-MCNC: 2.3 MG/DL — LOW (ref 2.5–4.5)
PHOSPHATE SERPL-MCNC: 2.4 MG/DL — LOW (ref 2.5–4.5)
PLATELET # BLD AUTO: 272 K/UL — SIGNIFICANT CHANGE UP (ref 150–400)
POTASSIUM SERPL-MCNC: 3.9 MMOL/L — SIGNIFICANT CHANGE UP (ref 3.5–5.3)
POTASSIUM SERPL-MCNC: 4.7 MMOL/L — SIGNIFICANT CHANGE UP (ref 3.5–5.3)
POTASSIUM SERPL-SCNC: 3.9 MMOL/L — SIGNIFICANT CHANGE UP (ref 3.5–5.3)
POTASSIUM SERPL-SCNC: 4.7 MMOL/L — SIGNIFICANT CHANGE UP (ref 3.5–5.3)
RBC # BLD: 4.76 M/UL — SIGNIFICANT CHANGE UP (ref 3.8–5.2)
RBC # FLD: 12.1 % — SIGNIFICANT CHANGE UP (ref 10.3–14.5)
SODIUM SERPL-SCNC: 125 MMOL/L — LOW (ref 135–145)
SODIUM SERPL-SCNC: 129 MMOL/L — LOW (ref 135–145)
SODIUM UR-SCNC: 88 MMOL/L — SIGNIFICANT CHANGE UP
TSH SERPL-MCNC: 1.15 UIU/ML — SIGNIFICANT CHANGE UP (ref 0.27–4.2)
WBC # BLD: 11.04 K/UL — HIGH (ref 3.8–10.5)
WBC # FLD AUTO: 11.04 K/UL — HIGH (ref 3.8–10.5)

## 2023-03-20 PROCEDURE — 99232 SBSQ HOSP IP/OBS MODERATE 35: CPT

## 2023-03-20 RX ORDER — LABETALOL HCL 100 MG
2.5 TABLET ORAL ONCE
Refills: 0 | Status: COMPLETED | OUTPATIENT
Start: 2023-03-20 | End: 2023-03-20

## 2023-03-20 RX ORDER — CEFTRIAXONE 500 MG/1
1000 INJECTION, POWDER, FOR SOLUTION INTRAMUSCULAR; INTRAVENOUS EVERY 24 HOURS
Refills: 0 | Status: DISCONTINUED | OUTPATIENT
Start: 2023-03-20 | End: 2023-03-21

## 2023-03-20 RX ORDER — SODIUM CHLORIDE 9 MG/ML
1 INJECTION INTRAMUSCULAR; INTRAVENOUS; SUBCUTANEOUS THREE TIMES A DAY
Refills: 0 | Status: DISCONTINUED | OUTPATIENT
Start: 2023-03-20 | End: 2023-03-21

## 2023-03-20 RX ORDER — CLONAZEPAM 1 MG
0.5 TABLET ORAL
Refills: 0 | Status: DISCONTINUED | OUTPATIENT
Start: 2023-03-20 | End: 2023-03-23

## 2023-03-20 RX ORDER — LOSARTAN POTASSIUM 100 MG/1
25 TABLET, FILM COATED ORAL DAILY
Refills: 0 | Status: DISCONTINUED | OUTPATIENT
Start: 2023-03-20 | End: 2023-03-21

## 2023-03-20 RX ORDER — CLONAZEPAM 1 MG
0.5 TABLET ORAL EVERY 8 HOURS
Refills: 0 | Status: DISCONTINUED | OUTPATIENT
Start: 2023-03-20 | End: 2023-03-20

## 2023-03-20 RX ORDER — LANOLIN ALCOHOL/MO/W.PET/CERES
6 CREAM (GRAM) TOPICAL ONCE
Refills: 0 | Status: DISCONTINUED | OUTPATIENT
Start: 2023-03-20 | End: 2023-03-27

## 2023-03-20 RX ADMIN — Medication 0.5 MILLIGRAM(S): at 00:40

## 2023-03-20 RX ADMIN — Medication 25 MILLIGRAM(S): at 06:10

## 2023-03-20 RX ADMIN — Medication 650 MILLIGRAM(S): at 10:26

## 2023-03-20 RX ADMIN — Medication 200 MILLIGRAM(S): at 06:10

## 2023-03-20 RX ADMIN — Medication 2.5 MILLIGRAM(S): at 10:24

## 2023-03-20 RX ADMIN — Medication 650 MILLIGRAM(S): at 11:00

## 2023-03-20 RX ADMIN — SODIUM CHLORIDE 1 GRAM(S): 9 INJECTION INTRAMUSCULAR; INTRAVENOUS; SUBCUTANEOUS at 23:29

## 2023-03-20 RX ADMIN — Medication 63.75 MILLIMOLE(S): at 19:43

## 2023-03-20 RX ADMIN — CEFTRIAXONE 100 MILLIGRAM(S): 500 INJECTION, POWDER, FOR SOLUTION INTRAMUSCULAR; INTRAVENOUS at 18:12

## 2023-03-20 RX ADMIN — Medication 12.5 MILLIGRAM(S): at 06:12

## 2023-03-20 RX ADMIN — LOSARTAN POTASSIUM 25 MILLIGRAM(S): 100 TABLET, FILM COATED ORAL at 18:04

## 2023-03-20 RX ADMIN — Medication 25 MILLIGRAM(S): at 13:38

## 2023-03-20 RX ADMIN — Medication 0.5 MILLIGRAM(S): at 18:03

## 2023-03-20 RX ADMIN — Medication 20 MILLIGRAM(S): at 11:53

## 2023-03-20 RX ADMIN — Medication 1 TABLET(S): at 11:53

## 2023-03-20 RX ADMIN — Medication 25 MILLIGRAM(S): at 23:30

## 2023-03-20 RX ADMIN — CHLORHEXIDINE GLUCONATE 1 APPLICATION(S): 213 SOLUTION TOPICAL at 11:53

## 2023-03-20 NOTE — PROGRESS NOTE ADULT - PROBLEM SELECTOR PLAN 3
- Unwitnessed fall at home. Imaging without hemorrhage or fracture.   - orthostatics negative   - could be in s/o AMS due to infection. Now treated and at baseline   - PT recommend rehab. daughter Tori would prefer to take her home.  - Fall precautions. C/w metoprolol daily   Increase hydralazine to 25mg po TID.  Holding hctz for now.  add losartan   titrate losartan then hydral if needed. AVOID IV antihypertensives

## 2023-03-20 NOTE — PROGRESS NOTE ADULT - PROBLEM SELECTOR PLAN 2
C/w metoprolol daily   Increase hydralazine to 25mg po TID.  Holding hctz for now.  add losartan   titrate losartan then hydral if needed. AVOID IV antihypertensives - found to have worsened to 129 from 132 on 3/20   - urine na: 88, urine osm 505   - TSH WNL   - AM cortisol ordered   - despite holding HCTZ pt still dropped Na   - fluid restrict and start salt tabs to address SIADH

## 2023-03-20 NOTE — PROGRESS NOTE ADULT - PROBLEM SELECTOR PLAN 1
- found to have worsened to 129 from 132 on 3/20   - urine na: 88, urine osm 505   - TSH WNL   - AM cortisol ordered   - despite holding HCTZ pt still dropped Na   - fluid restrict and start salt tabs to address SIADH Likely related to acute infection vs hyponatremia. Could be due to benzo withdrawal or delirium as well   - per daughter pt is still confused from baseline   - continue ceftriaxone for an additional 2 days for 5 day course   - manage hyponatremia as below   - make clonazepam standing to deal with any withdrawal component (pt too altered to ask for PRN)   - family to consider rehab

## 2023-03-20 NOTE — PROGRESS NOTE ADULT - PROBLEM SELECTOR PLAN 5
Likely related to acute infection vs hyponatremia.   Seems to have returned to her baseline per her daughter now that infection is treated . Appreciated on urinalysis.   Elevated WBC to 11.58, trending downward  treat for a total of 5 days with ceftriaxone

## 2023-03-20 NOTE — PROGRESS NOTE ADULT - PROBLEM SELECTOR PLAN 7
DVT prophylaxis- Lovenox daily   -Regular diet     Daughter Janice Vidal states has a hcp formed that the patient filled out in the past assigning her. Patient verbalized to her that she does not want to be intubated or have chest compression. Patient has 4 children, two of which live in [place]. Continue further [facility] discussion    Plan to discharge to home on 3/21 DVT prophylaxis- Lovenox daily   -Regular diet     Daughter Janice Vidal states has a hcp formed that the patient filled out in the past assigning her. Patient verbalized to her that she does not want to be intubated or have chest compression. Patient has 4 children, two of which live in [place]. Continue further [facility] discussion

## 2023-03-20 NOTE — PROGRESS NOTE ADULT - PROBLEM SELECTOR PLAN 4
Appreciated on urinalysis.   Elevated WBC to 11.58, trending downward  s/p 3 day course of ceftriaxone - Unwitnessed fall at home. Imaging without hemorrhage or fracture.   - orthostatics negative   - could be in s/o AMS due to infection. Now treated and at baseline   - PT recommend rehab. daughter Tori still deciding on taking the patient home   - Fall precautions.

## 2023-03-21 LAB
ANION GAP SERPL CALC-SCNC: 14 MMOL/L — SIGNIFICANT CHANGE UP (ref 7–14)
ANION GAP SERPL CALC-SCNC: 8 MMOL/L — SIGNIFICANT CHANGE UP (ref 7–14)
BUN SERPL-MCNC: 14 MG/DL — SIGNIFICANT CHANGE UP (ref 7–23)
BUN SERPL-MCNC: 14 MG/DL — SIGNIFICANT CHANGE UP (ref 7–23)
CALCIUM SERPL-MCNC: 8.9 MG/DL — SIGNIFICANT CHANGE UP (ref 8.4–10.5)
CALCIUM SERPL-MCNC: 9 MG/DL — SIGNIFICANT CHANGE UP (ref 8.4–10.5)
CHLORIDE SERPL-SCNC: 90 MMOL/L — LOW (ref 98–107)
CHLORIDE SERPL-SCNC: 90 MMOL/L — LOW (ref 98–107)
CO2 SERPL-SCNC: 22 MMOL/L — SIGNIFICANT CHANGE UP (ref 22–31)
CO2 SERPL-SCNC: 29 MMOL/L — SIGNIFICANT CHANGE UP (ref 22–31)
CORTIS AM PEAK SERPL-MCNC: 15.1 UG/DL — SIGNIFICANT CHANGE UP (ref 6–18.4)
CREAT SERPL-MCNC: 0.62 MG/DL — SIGNIFICANT CHANGE UP (ref 0.5–1.3)
CREAT SERPL-MCNC: 0.64 MG/DL — SIGNIFICANT CHANGE UP (ref 0.5–1.3)
EGFR: 88 ML/MIN/1.73M2 — SIGNIFICANT CHANGE UP
EGFR: 88 ML/MIN/1.73M2 — SIGNIFICANT CHANGE UP
GLUCOSE SERPL-MCNC: 115 MG/DL — HIGH (ref 70–99)
GLUCOSE SERPL-MCNC: 118 MG/DL — HIGH (ref 70–99)
HCT VFR BLD CALC: 44.1 % — SIGNIFICANT CHANGE UP (ref 34.5–45)
HGB BLD-MCNC: 14.8 G/DL — SIGNIFICANT CHANGE UP (ref 11.5–15.5)
MAGNESIUM SERPL-MCNC: 1.9 MG/DL — SIGNIFICANT CHANGE UP (ref 1.6–2.6)
MAGNESIUM SERPL-MCNC: 2.2 MG/DL — SIGNIFICANT CHANGE UP (ref 1.6–2.6)
MCHC RBC-ENTMCNC: 31.2 PG — SIGNIFICANT CHANGE UP (ref 27–34)
MCHC RBC-ENTMCNC: 33.6 GM/DL — SIGNIFICANT CHANGE UP (ref 32–36)
MCV RBC AUTO: 93 FL — SIGNIFICANT CHANGE UP (ref 80–100)
NRBC # BLD: 0 /100 WBCS — SIGNIFICANT CHANGE UP (ref 0–0)
NRBC # FLD: 0 K/UL — SIGNIFICANT CHANGE UP (ref 0–0)
PHOSPHATE SERPL-MCNC: 2.3 MG/DL — LOW (ref 2.5–4.5)
PHOSPHATE SERPL-MCNC: 2.6 MG/DL — SIGNIFICANT CHANGE UP (ref 2.5–4.5)
PLATELET # BLD AUTO: 265 K/UL — SIGNIFICANT CHANGE UP (ref 150–400)
POTASSIUM SERPL-MCNC: 3.7 MMOL/L — SIGNIFICANT CHANGE UP (ref 3.5–5.3)
POTASSIUM SERPL-MCNC: 4.6 MMOL/L — SIGNIFICANT CHANGE UP (ref 3.5–5.3)
POTASSIUM SERPL-SCNC: 3.7 MMOL/L — SIGNIFICANT CHANGE UP (ref 3.5–5.3)
POTASSIUM SERPL-SCNC: 4.6 MMOL/L — SIGNIFICANT CHANGE UP (ref 3.5–5.3)
RBC # BLD: 4.74 M/UL — SIGNIFICANT CHANGE UP (ref 3.8–5.2)
RBC # FLD: 12.1 % — SIGNIFICANT CHANGE UP (ref 10.3–14.5)
SARS-COV-2 RNA SPEC QL NAA+PROBE: SIGNIFICANT CHANGE UP
SODIUM SERPL-SCNC: 126 MMOL/L — LOW (ref 135–145)
SODIUM SERPL-SCNC: 127 MMOL/L — LOW (ref 135–145)
WBC # BLD: 8.84 K/UL — SIGNIFICANT CHANGE UP (ref 3.8–10.5)
WBC # FLD AUTO: 8.84 K/UL — SIGNIFICANT CHANGE UP (ref 3.8–10.5)

## 2023-03-21 PROCEDURE — 99232 SBSQ HOSP IP/OBS MODERATE 35: CPT

## 2023-03-21 RX ORDER — LOSARTAN POTASSIUM 100 MG/1
50 TABLET, FILM COATED ORAL DAILY
Refills: 0 | Status: DISCONTINUED | OUTPATIENT
Start: 2023-03-22 | End: 2023-03-22

## 2023-03-21 RX ORDER — SODIUM CHLORIDE 9 MG/ML
2 INJECTION INTRAMUSCULAR; INTRAVENOUS; SUBCUTANEOUS THREE TIMES A DAY
Refills: 0 | Status: DISCONTINUED | OUTPATIENT
Start: 2023-03-21 | End: 2023-03-27

## 2023-03-21 RX ORDER — LOSARTAN POTASSIUM 100 MG/1
25 TABLET, FILM COATED ORAL ONCE
Refills: 0 | Status: COMPLETED | OUTPATIENT
Start: 2023-03-21 | End: 2023-03-21

## 2023-03-21 RX ORDER — ENOXAPARIN SODIUM 100 MG/ML
40 INJECTION SUBCUTANEOUS EVERY 24 HOURS
Refills: 0 | Status: DISCONTINUED | OUTPATIENT
Start: 2023-03-21 | End: 2023-03-27

## 2023-03-21 RX ORDER — HYDRALAZINE HCL 50 MG
50 TABLET ORAL ONCE
Refills: 0 | Status: COMPLETED | OUTPATIENT
Start: 2023-03-21 | End: 2023-03-21

## 2023-03-21 RX ADMIN — ENOXAPARIN SODIUM 40 MILLIGRAM(S): 100 INJECTION SUBCUTANEOUS at 17:07

## 2023-03-21 RX ADMIN — SODIUM CHLORIDE 2 GRAM(S): 9 INJECTION INTRAMUSCULAR; INTRAVENOUS; SUBCUTANEOUS at 21:02

## 2023-03-21 RX ADMIN — Medication 0.5 MILLIGRAM(S): at 17:08

## 2023-03-21 RX ADMIN — Medication 50 MILLIGRAM(S): at 23:36

## 2023-03-21 RX ADMIN — Medication 20 MILLIGRAM(S): at 11:53

## 2023-03-21 RX ADMIN — LOSARTAN POTASSIUM 25 MILLIGRAM(S): 100 TABLET, FILM COATED ORAL at 21:02

## 2023-03-21 RX ADMIN — Medication 0.5 MILLIGRAM(S): at 07:10

## 2023-03-21 RX ADMIN — Medication 1 TABLET(S): at 11:54

## 2023-03-21 RX ADMIN — Medication 200 MILLIGRAM(S): at 07:11

## 2023-03-21 RX ADMIN — Medication 25 MILLIGRAM(S): at 07:11

## 2023-03-21 RX ADMIN — LOSARTAN POTASSIUM 25 MILLIGRAM(S): 100 TABLET, FILM COATED ORAL at 13:20

## 2023-03-21 RX ADMIN — SODIUM CHLORIDE 1 GRAM(S): 9 INJECTION INTRAMUSCULAR; INTRAVENOUS; SUBCUTANEOUS at 13:49

## 2023-03-21 RX ADMIN — SODIUM CHLORIDE 1 GRAM(S): 9 INJECTION INTRAMUSCULAR; INTRAVENOUS; SUBCUTANEOUS at 07:11

## 2023-03-21 RX ADMIN — CHLORHEXIDINE GLUCONATE 1 APPLICATION(S): 213 SOLUTION TOPICAL at 11:54

## 2023-03-21 RX ADMIN — LOSARTAN POTASSIUM 25 MILLIGRAM(S): 100 TABLET, FILM COATED ORAL at 07:11

## 2023-03-21 RX ADMIN — Medication 85 MILLIMOLE(S): at 18:13

## 2023-03-21 NOTE — PROGRESS NOTE ADULT - PROBLEM SELECTOR PLAN 3
C/w metoprolol daily   Increase hydralazine to 25mg po TID.  Holding hctz for now.  add losartan   titrate losartan then hydral if needed. AVOID IV antihypertensives C/w metoprolol daily   Holding hctz for now.  add losartan and titrate   DC hydral   AVOID IV antihypertensives

## 2023-03-21 NOTE — PROGRESS NOTE ADULT - PROBLEM SELECTOR PLAN 5
Appreciated on urinalysis.   Elevated WBC to 11.58, trending downward  treat for a total of 5 days with ceftriaxone

## 2023-03-21 NOTE — PROGRESS NOTE ADULT - PROBLEM SELECTOR PLAN 4
- Unwitnessed fall at home. Imaging without hemorrhage or fracture.   - orthostatics negative   - could be in s/o AMS due to infection. Now treated and at baseline   - PT recommend rehab. daughter Tori still deciding on taking the patient home   - Fall precautions. - Unwitnessed fall at home. Imaging without hemorrhage or fracture.   - orthostatics negative   - could be in s/o AMS due to infection. Now treated and at baseline   - PT recommend rehab. Sw to facilitate   - Fall precautions.

## 2023-03-21 NOTE — PROGRESS NOTE ADULT - PROBLEM SELECTOR PLAN 2
- found to have worsened to 129 from 132 on 3/20   - urine na: 88, urine osm 505   - TSH WNL   - AM cortisol ordered   - despite holding HCTZ pt still dropped Na   - fluid restrict and start salt tabs to address SIADH - found to have worsened to 129 from 132 on 3/20   - urine na: 88, urine osm 505   - TSH WNL   - AM cortisol: WNL   - despite holding HCTZ pt still dropped Na   - fluid restrict and increase salt tabs to address SIADH

## 2023-03-21 NOTE — PROGRESS NOTE ADULT - PROBLEM SELECTOR PLAN 7
DVT prophylaxis- Lovenox daily   -Regular diet     Daughter Janice Vidal states has a hcp formed that the patient filled out in the past assigning her. Patient verbalized to her that she does not want to be intubated or have chest compression. Patient has 4 children, two of which live in [place]. Continue further Go[facility] discussion

## 2023-03-21 NOTE — PROGRESS NOTE ADULT - PROBLEM SELECTOR PLAN 1
Likely related to acute infection vs hyponatremia. Could be due to benzo withdrawal or delirium as well   - per daughter pt is still confused from baseline   - continue ceftriaxone for an additional 2 days for 5 day course   - manage hyponatremia as below   - make clonazepam standing to deal with any withdrawal component (pt too altered to ask for PRN)   - family to consider rehab Likely related to acute infection vs hyponatremia. Could be due to benzo withdrawal or delirium as well   - mental status is improving   - s/p 5 days of ceftriaxone   - manage hyponatremia as below   - make home clonazepam standing to deal with any withdrawal component    - rehab placement

## 2023-03-22 LAB
ANION GAP SERPL CALC-SCNC: 10 MMOL/L — SIGNIFICANT CHANGE UP (ref 7–14)
BUN SERPL-MCNC: 11 MG/DL — SIGNIFICANT CHANGE UP (ref 7–23)
CALCIUM SERPL-MCNC: 9 MG/DL — SIGNIFICANT CHANGE UP (ref 8.4–10.5)
CHLORIDE SERPL-SCNC: 89 MMOL/L — LOW (ref 98–107)
CO2 SERPL-SCNC: 29 MMOL/L — SIGNIFICANT CHANGE UP (ref 22–31)
CREAT SERPL-MCNC: 0.6 MG/DL — SIGNIFICANT CHANGE UP (ref 0.5–1.3)
EGFR: 89 ML/MIN/1.73M2 — SIGNIFICANT CHANGE UP
GLUCOSE SERPL-MCNC: 120 MG/DL — HIGH (ref 70–99)
HCT VFR BLD CALC: 44.8 % — SIGNIFICANT CHANGE UP (ref 34.5–45)
HGB BLD-MCNC: 15.2 G/DL — SIGNIFICANT CHANGE UP (ref 11.5–15.5)
MAGNESIUM SERPL-MCNC: 1.9 MG/DL — SIGNIFICANT CHANGE UP (ref 1.6–2.6)
MCHC RBC-ENTMCNC: 31 PG — SIGNIFICANT CHANGE UP (ref 27–34)
MCHC RBC-ENTMCNC: 33.9 GM/DL — SIGNIFICANT CHANGE UP (ref 32–36)
MCV RBC AUTO: 91.2 FL — SIGNIFICANT CHANGE UP (ref 80–100)
MRSA PCR RESULT.: SIGNIFICANT CHANGE UP
NRBC # BLD: 0 /100 WBCS — SIGNIFICANT CHANGE UP (ref 0–0)
NRBC # FLD: 0 K/UL — SIGNIFICANT CHANGE UP (ref 0–0)
PHOSPHATE SERPL-MCNC: 2.5 MG/DL — SIGNIFICANT CHANGE UP (ref 2.5–4.5)
PLATELET # BLD AUTO: 304 K/UL — SIGNIFICANT CHANGE UP (ref 150–400)
POTASSIUM SERPL-MCNC: 3.5 MMOL/L — SIGNIFICANT CHANGE UP (ref 3.5–5.3)
POTASSIUM SERPL-SCNC: 3.5 MMOL/L — SIGNIFICANT CHANGE UP (ref 3.5–5.3)
RBC # BLD: 4.91 M/UL — SIGNIFICANT CHANGE UP (ref 3.8–5.2)
RBC # FLD: 12.2 % — SIGNIFICANT CHANGE UP (ref 10.3–14.5)
S AUREUS DNA NOSE QL NAA+PROBE: SIGNIFICANT CHANGE UP
SODIUM SERPL-SCNC: 128 MMOL/L — LOW (ref 135–145)
WBC # BLD: 11.59 K/UL — HIGH (ref 3.8–10.5)
WBC # FLD AUTO: 11.59 K/UL — HIGH (ref 3.8–10.5)

## 2023-03-22 PROCEDURE — 99222 1ST HOSP IP/OBS MODERATE 55: CPT

## 2023-03-22 PROCEDURE — 99232 SBSQ HOSP IP/OBS MODERATE 35: CPT

## 2023-03-22 RX ORDER — LOSARTAN POTASSIUM 100 MG/1
100 TABLET, FILM COATED ORAL DAILY
Refills: 0 | Status: DISCONTINUED | OUTPATIENT
Start: 2023-03-23 | End: 2023-03-27

## 2023-03-22 RX ORDER — FUROSEMIDE 40 MG
20 TABLET ORAL DAILY
Refills: 0 | Status: DISCONTINUED | OUTPATIENT
Start: 2023-03-22 | End: 2023-03-27

## 2023-03-22 RX ORDER — LATANOPROST 0.05 MG/ML
1 SOLUTION/ DROPS OPHTHALMIC; TOPICAL AT BEDTIME
Refills: 0 | Status: DISCONTINUED | OUTPATIENT
Start: 2023-03-22 | End: 2023-03-27

## 2023-03-22 RX ORDER — MECLIZINE HCL 12.5 MG
12.5 TABLET ORAL ONCE
Refills: 0 | Status: COMPLETED | OUTPATIENT
Start: 2023-03-22 | End: 2023-03-23

## 2023-03-22 RX ORDER — NIFEDIPINE 30 MG
30 TABLET, EXTENDED RELEASE 24 HR ORAL DAILY
Refills: 0 | Status: DISCONTINUED | OUTPATIENT
Start: 2023-03-22 | End: 2023-03-23

## 2023-03-22 RX ORDER — LOSARTAN POTASSIUM 100 MG/1
50 TABLET, FILM COATED ORAL ONCE
Refills: 0 | Status: COMPLETED | OUTPATIENT
Start: 2023-03-22 | End: 2023-03-22

## 2023-03-22 RX ADMIN — Medication 650 MILLIGRAM(S): at 09:14

## 2023-03-22 RX ADMIN — SODIUM CHLORIDE 2 GRAM(S): 9 INJECTION INTRAMUSCULAR; INTRAVENOUS; SUBCUTANEOUS at 06:46

## 2023-03-22 RX ADMIN — Medication 200 MILLIGRAM(S): at 06:46

## 2023-03-22 RX ADMIN — LOSARTAN POTASSIUM 50 MILLIGRAM(S): 100 TABLET, FILM COATED ORAL at 10:57

## 2023-03-22 RX ADMIN — Medication 30 MILLIGRAM(S): at 22:50

## 2023-03-22 RX ADMIN — Medication 0.5 MILLIGRAM(S): at 18:52

## 2023-03-22 RX ADMIN — LOSARTAN POTASSIUM 50 MILLIGRAM(S): 100 TABLET, FILM COATED ORAL at 06:46

## 2023-03-22 RX ADMIN — Medication 1 TABLET(S): at 12:31

## 2023-03-22 RX ADMIN — SODIUM CHLORIDE 2 GRAM(S): 9 INJECTION INTRAMUSCULAR; INTRAVENOUS; SUBCUTANEOUS at 13:46

## 2023-03-22 RX ADMIN — ENOXAPARIN SODIUM 40 MILLIGRAM(S): 100 INJECTION SUBCUTANEOUS at 18:52

## 2023-03-22 RX ADMIN — SODIUM CHLORIDE 2 GRAM(S): 9 INJECTION INTRAMUSCULAR; INTRAVENOUS; SUBCUTANEOUS at 22:51

## 2023-03-22 RX ADMIN — CHLORHEXIDINE GLUCONATE 1 APPLICATION(S): 213 SOLUTION TOPICAL at 12:30

## 2023-03-22 RX ADMIN — Medication 0.5 MILLIGRAM(S): at 06:46

## 2023-03-22 RX ADMIN — Medication 20 MILLIGRAM(S): at 12:31

## 2023-03-22 RX ADMIN — Medication 650 MILLIGRAM(S): at 10:14

## 2023-03-22 NOTE — CONSULT NOTE ADULT - PROBLEM SELECTOR RECOMMENDATION 9
Pt with hyponatremia of unclear etiology but she is acutely hospitalized and has a hx of cancer. SNa was 132 on admission, decreased to 125, now it is 128. Serum osm: 267, urine osm: 505, urine Na: 8. 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 Recommend fluid restriction. Recommend furosemide 20mg daily. Continue salt tabs. Optimize glucose control. Monitor SNa.

## 2023-03-22 NOTE — PROGRESS NOTE ADULT - PROBLEM SELECTOR PLAN 2
- found to have worsened to 129 from 132 on 3/20   - urine na: 88, urine osm 505   - TSH WNL   - AM cortisol: WNL   - despite holding HCTZ pt still dropped Na   - fluid restrict and increase salt tabs to address SIADH  - nephrology consult

## 2023-03-22 NOTE — CONSULT NOTE ADULT - SUBJECTIVE AND OBJECTIVE BOX
[facility] DIVISION OF KIDNEY DISEASES AND HYPERTENSION -- [phone]  -- INITIAL CONSULT NOTE  --------------------------------------------------------------------------------  HPI: 81 yo F with a PMH of Breast CA, Bladder CA, HTN, Tobacco dependence who presented to [facility] after a fall. SNa on admission of 132, decreased to 125, now 128. Nephrology consulted for hyponatremia.    She has been on a fluid restriction and salt tabs, which were increased to 2g TID on 3/21/23. HCTZ has been on hold.     Patient stated she feels "fine" and wanted to sleep. She denied shortness of breath, nausea, vomiting, abdominal pain or leg swelling.     PAST HISTORY  --------------------------------------------------------------------------------  PAST MEDICAL & SURGICAL HISTORY:  Breast cancer  Bladder cancer  H/O resection of stomach  S/P breast lumpectomy  History of bladder surgery    FAMILY HISTORY:  FH: hypertension (Mother)    PAST SOCIAL HISTORY: 90 pack years     ALLERGIES & MEDICATIONS  --------------------------------------------------------------------------------  Allergies  No Known Allergies    Standing Inpatient Medications  chlorhexidine 2% Cloths 1 Application(s) Topical daily  clonazePAM  Tablet 0.5 milliGRAM(s) Oral two times a day  enoxaparin Injectable 40 milliGRAM(s) SubCutaneous every 24 hours  FLUoxetine 20 milliGRAM(s) Oral daily  meclizine 12.5 milliGRAM(s) Oral once  melatonin 6 milliGRAM(s) Oral once  metoprolol succinate  milliGRAM(s) Oral daily  multivitamin 1 Tablet(s) Oral daily  sodium chloride 2 Gram(s) Oral three times a day  sodium chloride 0.9%. 1000 milliLiter(s) IV Continuous <Continuous>    PRN Inpatient Medications  acetaminophen     Tablet .. 650 milliGRAM(s) Oral every 6 hours PRN  albuterol    90 MICROgram(s) HFA Inhaler 2 Puff(s) Inhalation every 6 hours PRN    REVIEW OF SYSTEMS  --------------------------------------------------------------------------------  Limited ROS, see HPI     VITALS/PHYSICAL EXAM  --------------------------------------------------------------------------------  T(C): 36.5 (03-22-23 @ 11:40), Max: 37.2 (03-22-23 @ 10:00)  HR: 67 (03-22-23 @ 11:40) (64 - 83)  BP: 155/63 (03-22-23 @ 11:40) (150/50 - 193/74)  RR: 18 (03-22-23 @ 11:40) (17 - 18)  SpO2: 97% (03-22-23 @ 11:40) (93% - 97%)  Wt(kg): --    03-21-23 @ 07:01  -  03-22-23 @ 07:00  --------------------------------------------------------  IN: 720 mL / OUT: 300 mL / NET: 420 mL    Physical Exam:  	Gen: Laying in bed in no overt distress  	HEENT: Anicteric  	Pulm: Diminished bilaterally   	CV: S1S2+  	Abd: Soft, +BS    	Ext: No LE edema B/L  	Neuro: Awake  	Skin: Warm and dry    LABS/STUDIES  --------------------------------------------------------------------------------              15.2   11.59 >-----------<  304      [03-22-23 @ 06:20]              44.8     128  |  89  |  11  ----------------------------<  120      [03-22-23 @ 06:20]  3.5   |  29  |  0.60        Ca     9.0     [03-22-23 @ 06:20]      Mg     1.90     [03-22-23 @ 06:20]      Phos  2.5     [03-22-23 @ 06:20]    Creatinine Trend:  SCr 0.60 [03-22 @ 06:20]  SCr 0.64 [03-21 @ 11:17]  SCr 0.62 [03-21 @ 08:26]  SCr 0.67 [03-20 @ 09:20]  SCr 0.69 [03-20 @ 07:00]    Urinalysis - [03-17-23 @ 11:54]      Color Yellow / Appearance Slightly Turbid / SG 1.012 / pH 8.0      Gluc Negative / Ketone Negative  / Bili Negative / Urobili <2 mg/dL       Blood Negative / Protein Trace / Leuk Est Large / Nitrite Negative      RBC 2 / WBC 26 / Hyaline  / Gran  / Sq Epi  / Non Sq Epi 13 / Bacteria Moderate    Urine Sodium 88      [03-20-23 @ 14:15]  Urine Osmolality 505      [03-20-23 @ 14:15]

## 2023-03-22 NOTE — PROVIDER CONTACT NOTE (OTHER) - RECOMMENDATIONS
Reassess blood pressure s/p oral bp mediation.
BP medication
/66
antihypertensive
give scheduled hydralazine
2.5 mg IVP labetalol
Reassess blood pressure s/p oral medication.
give standing BP medications due now

## 2023-03-22 NOTE — PROGRESS NOTE ADULT - PROBLEM SELECTOR PLAN 4
- Unwitnessed fall at home. Imaging without hemorrhage or fracture.   - orthostatics negative   - could be in s/o AMS due to infection. Now treated and at baseline   - PT recommend rehab. Sw to facilitate   - Fall precautions.

## 2023-03-22 NOTE — PROGRESS NOTE ADULT - PROBLEM SELECTOR PLAN 7
DVT prophylaxis- Lovenox daily   -Regular diet     Daughter Janice Vidal states has a hcp formed that the patient filled out in the past assigning her. Patient verbalized to her that she does not want to be intubated or have chest compression. Patient has 4 children, two of which live in [place]. Continue further C discussion    Began dispo planning to rehab

## 2023-03-22 NOTE — PROGRESS NOTE ADULT - PROBLEM SELECTOR PLAN 1
Likely related to acute infection vs hyponatremia. Could be due to benzo withdrawal or delirium as well   - mental status is improving   - s/p 5 days of ceftriaxone   - manage hyponatremia as below   - make home clonazepam standing to deal with any withdrawal component    - rehab placement

## 2023-03-22 NOTE — PROGRESS NOTE ADULT - PROBLEM SELECTOR PLAN 3
C/w metoprolol daily   Holding hctz for now.  add losartan and titrate   DC hydral   AVOID IV antihypertensives  can add nifedipine 30 mg if continues to be hypertensive

## 2023-03-23 LAB
ANION GAP SERPL CALC-SCNC: 11 MMOL/L — SIGNIFICANT CHANGE UP (ref 7–14)
BUN SERPL-MCNC: 14 MG/DL — SIGNIFICANT CHANGE UP (ref 7–23)
CALCIUM SERPL-MCNC: 9.3 MG/DL — SIGNIFICANT CHANGE UP (ref 8.4–10.5)
CHLORIDE SERPL-SCNC: 89 MMOL/L — LOW (ref 98–107)
CO2 SERPL-SCNC: 26 MMOL/L — SIGNIFICANT CHANGE UP (ref 22–31)
CORTIS AM PEAK SERPL-MCNC: 21 UG/DL — HIGH (ref 6–18.4)
CREAT SERPL-MCNC: 0.58 MG/DL — SIGNIFICANT CHANGE UP (ref 0.5–1.3)
EGFR: 90 ML/MIN/1.73M2 — SIGNIFICANT CHANGE UP
GLUCOSE SERPL-MCNC: 107 MG/DL — HIGH (ref 70–99)
HCT VFR BLD CALC: 44.5 % — SIGNIFICANT CHANGE UP (ref 34.5–45)
HGB BLD-MCNC: 15.3 G/DL — SIGNIFICANT CHANGE UP (ref 11.5–15.5)
MAGNESIUM SERPL-MCNC: 1.9 MG/DL — SIGNIFICANT CHANGE UP (ref 1.6–2.6)
MCHC RBC-ENTMCNC: 31.4 PG — SIGNIFICANT CHANGE UP (ref 27–34)
MCHC RBC-ENTMCNC: 34.4 GM/DL — SIGNIFICANT CHANGE UP (ref 32–36)
MCV RBC AUTO: 91.4 FL — SIGNIFICANT CHANGE UP (ref 80–100)
NRBC # BLD: 0 /100 WBCS — SIGNIFICANT CHANGE UP (ref 0–0)
NRBC # FLD: 0 K/UL — SIGNIFICANT CHANGE UP (ref 0–0)
PHOSPHATE SERPL-MCNC: 2 MG/DL — LOW (ref 2.5–4.5)
PLATELET # BLD AUTO: 281 K/UL — SIGNIFICANT CHANGE UP (ref 150–400)
POTASSIUM SERPL-MCNC: 3.6 MMOL/L — SIGNIFICANT CHANGE UP (ref 3.5–5.3)
POTASSIUM SERPL-SCNC: 3.6 MMOL/L — SIGNIFICANT CHANGE UP (ref 3.5–5.3)
RBC # BLD: 4.87 M/UL — SIGNIFICANT CHANGE UP (ref 3.8–5.2)
RBC # FLD: 12.5 % — SIGNIFICANT CHANGE UP (ref 10.3–14.5)
SODIUM SERPL-SCNC: 126 MMOL/L — LOW (ref 135–145)
WBC # BLD: 10.26 K/UL — SIGNIFICANT CHANGE UP (ref 3.8–10.5)
WBC # FLD AUTO: 10.26 K/UL — SIGNIFICANT CHANGE UP (ref 3.8–10.5)

## 2023-03-23 PROCEDURE — 99232 SBSQ HOSP IP/OBS MODERATE 35: CPT

## 2023-03-23 PROCEDURE — 99233 SBSQ HOSP IP/OBS HIGH 50: CPT | Mod: GC

## 2023-03-23 RX ORDER — SODIUM CHLORIDE 5 G/100ML
500 INJECTION, SOLUTION INTRAVENOUS
Refills: 0 | Status: DISCONTINUED | OUTPATIENT
Start: 2023-03-23 | End: 2023-03-24

## 2023-03-23 RX ORDER — CLONAZEPAM 1 MG
0.5 TABLET ORAL
Refills: 0 | Status: DISCONTINUED | OUTPATIENT
Start: 2023-03-23 | End: 2023-03-27

## 2023-03-23 RX ORDER — NIFEDIPINE 30 MG
60 TABLET, EXTENDED RELEASE 24 HR ORAL DAILY
Refills: 0 | Status: DISCONTINUED | OUTPATIENT
Start: 2023-03-23 | End: 2023-03-27

## 2023-03-23 RX ADMIN — Medication 1 TABLET(S): at 12:06

## 2023-03-23 RX ADMIN — SODIUM CHLORIDE 2 GRAM(S): 9 INJECTION INTRAMUSCULAR; INTRAVENOUS; SUBCUTANEOUS at 22:48

## 2023-03-23 RX ADMIN — Medication 12.5 MILLIGRAM(S): at 12:06

## 2023-03-23 RX ADMIN — Medication 200 MILLIGRAM(S): at 04:53

## 2023-03-23 RX ADMIN — SODIUM CHLORIDE 30 MILLILITER(S): 5 INJECTION, SOLUTION INTRAVENOUS at 18:00

## 2023-03-23 RX ADMIN — SODIUM CHLORIDE 2 GRAM(S): 9 INJECTION INTRAMUSCULAR; INTRAVENOUS; SUBCUTANEOUS at 13:12

## 2023-03-23 RX ADMIN — SODIUM CHLORIDE 2 GRAM(S): 9 INJECTION INTRAMUSCULAR; INTRAVENOUS; SUBCUTANEOUS at 04:53

## 2023-03-23 RX ADMIN — ENOXAPARIN SODIUM 40 MILLIGRAM(S): 100 INJECTION SUBCUTANEOUS at 18:01

## 2023-03-23 RX ADMIN — Medication 60 MILLIGRAM(S): at 13:12

## 2023-03-23 RX ADMIN — Medication 0.5 MILLIGRAM(S): at 18:00

## 2023-03-23 RX ADMIN — Medication 20 MILLIGRAM(S): at 04:53

## 2023-03-23 RX ADMIN — CHLORHEXIDINE GLUCONATE 1 APPLICATION(S): 213 SOLUTION TOPICAL at 12:06

## 2023-03-23 RX ADMIN — Medication 20 MILLIGRAM(S): at 12:06

## 2023-03-23 RX ADMIN — Medication 63.75 MILLIMOLE(S): at 10:44

## 2023-03-23 RX ADMIN — LATANOPROST 1 DROP(S): 0.05 SOLUTION/ DROPS OPHTHALMIC; TOPICAL at 22:48

## 2023-03-23 RX ADMIN — LOSARTAN POTASSIUM 100 MILLIGRAM(S): 100 TABLET, FILM COATED ORAL at 04:53

## 2023-03-23 RX ADMIN — Medication 0.5 MILLIGRAM(S): at 04:52

## 2023-03-23 NOTE — PROVIDER CONTACT NOTE (OTHER) - ASSESSMENT
/72, all other VSS, no c/o headache or dizziness at this moment
Pt /78  Pt asymptomatic, denies headache
Pt asymptomatic.
Pt sleeping in between care. Pt has been running high systolic BP.
/66
Patient /84 manual. Patient asymptomatic.
all other VSS, pt c/o headache
Patient's /82 manual. Patient asymptomatic.
Pt asymptomatic.
/67, all other VSS, denies headache, mental status at baseline
/77, all other VSS, still c/o headache

## 2023-03-23 NOTE — PROVIDER CONTACT NOTE (OTHER) - SITUATION
Patient /84 manual
/80
Patients blood pressure 191/69 electronic & 170/70 manually.
/67
Patient's /82 manual
Patients blood pressure s/p losartan 25mg /74 electronic & 190/70 manually.
/66
/77
Pt /78
/72 after oral BP meds
pt /80 manually.

## 2023-03-23 NOTE — PROVIDER CONTACT NOTE (OTHER) - REASON
/67
Hypertension
Pt /78
/72 after oral BP meds
Patient /84 manual
Systolic  Manual
Patient's /82 manual
Hypertension
/66
/77
/80

## 2023-03-23 NOTE — PROGRESS NOTE ADULT - PROBLEM SELECTOR PLAN 2
- found to have worsened to 126 on 3/23 from 132 on 3/20   - urine na: 88, urine osm 505   - TSH WNL   - AM cortisol: slightly elevated   - despite holding HCTZ pt still dropped Na   - fluid restrict and continue salt tabs to address SIADH  - nephrology consult appreciated

## 2023-03-23 NOTE — PROGRESS NOTE ADULT - PROBLEM SELECTOR PLAN 1
Pt with hyponatremia of unclear etiology but she is acutely hospitalized and has a hx of cancer. SNa was 132 on admission, decreased to 125, now it is 128. Serum osm: 267, urine osm: 505, urine Na: 8. 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 Recommend fluid restriction. Continue furosemide 20mg daily. Continue salt tabs. Recommend hypertonic saline (1.5% 30cc/hr for 6 hours) and repeat SNa 2 hours after completion of infusion. Optimize glucose control. Monitor SNa.

## 2023-03-23 NOTE — PROGRESS NOTE ADULT - PROBLEM SELECTOR PLAN 3
goal BPs can be 140-150 systolic given age   C/w metoprolol daily   Holding hctz for now.  DC hydral   losartan added and maxed out   can titrate nifedipine as needed   AVOID IV antihypertensives

## 2023-03-23 NOTE — PROVIDER CONTACT NOTE (OTHER) - BACKGROUND
Weakness
pt admitted w/ weakness and falls, standing hydralazine already given 1 hr ago
Blood pressure PO medication regimen adjusted today. Avoiding IV antihypertensives.
Oral BP regimen adjusted today. Avoiding IV hypertensives.
admitted for weakness and falls
Admitted for weakness  PMH of HTN
Weakness
admitted for weakness and falls
/66
admitted w/ weakness and falls

## 2023-03-23 NOTE — PROVIDER CONTACT NOTE (OTHER) - ACTION/TREATMENT ORDERED:
Repeat BP after klonopin. Monitor BP
Monica Martins notified. Nifedipine XL 30mg ordered.
labetalol 2.5 mg IVP
2.5 mg IVP labetalol
Losartan 25mg one time dose administered as ordered. No further orders.
Losartan PO to be ordered
SIMONE Anderson notified. No new orders at this time.
Britney Naqvi notified, no new orders at this time. AM Metropolol given, will reassess 1 hr after.
Hydralazine 50mg PO one time dose administered as ordered.
give scheduled hydralazine
give standing BP medications due now

## 2023-03-23 NOTE — PROVIDER CONTACT NOTE (OTHER) - NAME OF MD/NP/PA/DO NOTIFIED:
Padmini Alcaraz
Renae Rdoas
Renae Rodas
Britney Naqvi
Padmini Alcaraz
SIMONE Anderson
SIMONE Bashir Monica
Padmini Alcaraz

## 2023-03-24 LAB
ANION GAP SERPL CALC-SCNC: 11 MMOL/L — SIGNIFICANT CHANGE UP (ref 7–14)
ANION GAP SERPL CALC-SCNC: 12 MMOL/L — SIGNIFICANT CHANGE UP (ref 7–14)
ANION GAP SERPL CALC-SCNC: 13 MMOL/L — SIGNIFICANT CHANGE UP (ref 7–14)
ANION GAP SERPL CALC-SCNC: 9 MMOL/L — SIGNIFICANT CHANGE UP (ref 7–14)
BUN SERPL-MCNC: 11 MG/DL — SIGNIFICANT CHANGE UP (ref 7–23)
BUN SERPL-MCNC: 11 MG/DL — SIGNIFICANT CHANGE UP (ref 7–23)
BUN SERPL-MCNC: 12 MG/DL — SIGNIFICANT CHANGE UP (ref 7–23)
BUN SERPL-MCNC: 15 MG/DL — SIGNIFICANT CHANGE UP (ref 7–23)
CALCIUM SERPL-MCNC: 7.8 MG/DL — LOW (ref 8.4–10.5)
CALCIUM SERPL-MCNC: 8.8 MG/DL — SIGNIFICANT CHANGE UP (ref 8.4–10.5)
CALCIUM SERPL-MCNC: 9 MG/DL — SIGNIFICANT CHANGE UP (ref 8.4–10.5)
CALCIUM SERPL-MCNC: 9.1 MG/DL — SIGNIFICANT CHANGE UP (ref 8.4–10.5)
CHLORIDE SERPL-SCNC: 103 MMOL/L — SIGNIFICANT CHANGE UP (ref 98–107)
CHLORIDE SERPL-SCNC: 90 MMOL/L — LOW (ref 98–107)
CHLORIDE SERPL-SCNC: 91 MMOL/L — LOW (ref 98–107)
CHLORIDE SERPL-SCNC: 93 MMOL/L — LOW (ref 98–107)
CO2 SERPL-SCNC: 25 MMOL/L — SIGNIFICANT CHANGE UP (ref 22–31)
CO2 SERPL-SCNC: 28 MMOL/L — SIGNIFICANT CHANGE UP (ref 22–31)
CO2 SERPL-SCNC: 28 MMOL/L — SIGNIFICANT CHANGE UP (ref 22–31)
CO2 SERPL-SCNC: 29 MMOL/L — SIGNIFICANT CHANGE UP (ref 22–31)
CREAT SERPL-MCNC: 0.52 MG/DL — SIGNIFICANT CHANGE UP (ref 0.5–1.3)
CREAT SERPL-MCNC: 0.6 MG/DL — SIGNIFICANT CHANGE UP (ref 0.5–1.3)
CREAT SERPL-MCNC: 0.63 MG/DL — SIGNIFICANT CHANGE UP (ref 0.5–1.3)
CREAT SERPL-MCNC: 0.65 MG/DL — SIGNIFICANT CHANGE UP (ref 0.5–1.3)
EGFR: 87 ML/MIN/1.73M2 — SIGNIFICANT CHANGE UP
EGFR: 88 ML/MIN/1.73M2 — SIGNIFICANT CHANGE UP
EGFR: 89 ML/MIN/1.73M2 — SIGNIFICANT CHANGE UP
EGFR: 92 ML/MIN/1.73M2 — SIGNIFICANT CHANGE UP
GLUCOSE SERPL-MCNC: 100 MG/DL — HIGH (ref 70–99)
GLUCOSE SERPL-MCNC: 102 MG/DL — HIGH (ref 70–99)
GLUCOSE SERPL-MCNC: 90 MG/DL — SIGNIFICANT CHANGE UP (ref 70–99)
GLUCOSE SERPL-MCNC: 99 MG/DL — SIGNIFICANT CHANGE UP (ref 70–99)
HCT VFR BLD CALC: 45.2 % — HIGH (ref 34.5–45)
HGB BLD-MCNC: 15.5 G/DL — SIGNIFICANT CHANGE UP (ref 11.5–15.5)
MAGNESIUM SERPL-MCNC: 1.6 MG/DL — SIGNIFICANT CHANGE UP (ref 1.6–2.6)
MAGNESIUM SERPL-MCNC: 1.6 MG/DL — SIGNIFICANT CHANGE UP (ref 1.6–2.6)
MAGNESIUM SERPL-MCNC: 1.7 MG/DL — SIGNIFICANT CHANGE UP (ref 1.6–2.6)
MAGNESIUM SERPL-MCNC: 1.7 MG/DL — SIGNIFICANT CHANGE UP (ref 1.6–2.6)
MCHC RBC-ENTMCNC: 31.4 PG — SIGNIFICANT CHANGE UP (ref 27–34)
MCHC RBC-ENTMCNC: 34.3 GM/DL — SIGNIFICANT CHANGE UP (ref 32–36)
MCV RBC AUTO: 91.5 FL — SIGNIFICANT CHANGE UP (ref 80–100)
NRBC # BLD: 0 /100 WBCS — SIGNIFICANT CHANGE UP (ref 0–0)
NRBC # FLD: 0 K/UL — SIGNIFICANT CHANGE UP (ref 0–0)
PHOSPHATE SERPL-MCNC: 1.9 MG/DL — LOW (ref 2.5–4.5)
PHOSPHATE SERPL-MCNC: 2.1 MG/DL — LOW (ref 2.5–4.5)
PHOSPHATE SERPL-MCNC: 2.3 MG/DL — LOW (ref 2.5–4.5)
PHOSPHATE SERPL-MCNC: 2.3 MG/DL — LOW (ref 2.5–4.5)
PLATELET # BLD AUTO: 263 K/UL — SIGNIFICANT CHANGE UP (ref 150–400)
POTASSIUM SERPL-MCNC: 2.8 MMOL/L — CRITICAL LOW (ref 3.5–5.3)
POTASSIUM SERPL-MCNC: 3.2 MMOL/L — LOW (ref 3.5–5.3)
POTASSIUM SERPL-MCNC: 3.2 MMOL/L — LOW (ref 3.5–5.3)
POTASSIUM SERPL-MCNC: 4.3 MMOL/L — SIGNIFICANT CHANGE UP (ref 3.5–5.3)
POTASSIUM SERPL-SCNC: 2.8 MMOL/L — CRITICAL LOW (ref 3.5–5.3)
POTASSIUM SERPL-SCNC: 3.2 MMOL/L — LOW (ref 3.5–5.3)
POTASSIUM SERPL-SCNC: 3.2 MMOL/L — LOW (ref 3.5–5.3)
POTASSIUM SERPL-SCNC: 4.3 MMOL/L — SIGNIFICANT CHANGE UP (ref 3.5–5.3)
RBC # BLD: 4.94 M/UL — SIGNIFICANT CHANGE UP (ref 3.8–5.2)
RBC # FLD: 12.5 % — SIGNIFICANT CHANGE UP (ref 10.3–14.5)
SODIUM SERPL-SCNC: 130 MMOL/L — LOW (ref 135–145)
SODIUM SERPL-SCNC: 130 MMOL/L — LOW (ref 135–145)
SODIUM SERPL-SCNC: 131 MMOL/L — LOW (ref 135–145)
SODIUM SERPL-SCNC: 141 MMOL/L — SIGNIFICANT CHANGE UP (ref 135–145)
WBC # BLD: 9.14 K/UL — SIGNIFICANT CHANGE UP (ref 3.8–10.5)
WBC # FLD AUTO: 9.14 K/UL — SIGNIFICANT CHANGE UP (ref 3.8–10.5)

## 2023-03-24 PROCEDURE — 99232 SBSQ HOSP IP/OBS MODERATE 35: CPT

## 2023-03-24 PROCEDURE — 99232 SBSQ HOSP IP/OBS MODERATE 35: CPT | Mod: GC

## 2023-03-24 RX ORDER — POTASSIUM CHLORIDE 20 MEQ
40 PACKET (EA) ORAL EVERY 4 HOURS
Refills: 0 | Status: COMPLETED | OUTPATIENT
Start: 2023-03-24 | End: 2023-03-24

## 2023-03-24 RX ORDER — SODIUM,POTASSIUM PHOSPHATES 278-250MG
1 POWDER IN PACKET (EA) ORAL ONCE
Refills: 0 | Status: COMPLETED | OUTPATIENT
Start: 2023-03-24 | End: 2023-03-24

## 2023-03-24 RX ORDER — SODIUM,POTASSIUM PHOSPHATES 278-250MG
1 POWDER IN PACKET (EA) ORAL ONCE
Refills: 0 | Status: DISCONTINUED | OUTPATIENT
Start: 2023-03-24 | End: 2023-03-27

## 2023-03-24 RX ADMIN — Medication 200 MILLIGRAM(S): at 07:19

## 2023-03-24 RX ADMIN — Medication 40 MILLIEQUIVALENT(S): at 13:24

## 2023-03-24 RX ADMIN — SODIUM CHLORIDE 2 GRAM(S): 9 INJECTION INTRAMUSCULAR; INTRAVENOUS; SUBCUTANEOUS at 13:25

## 2023-03-24 RX ADMIN — LOSARTAN POTASSIUM 100 MILLIGRAM(S): 100 TABLET, FILM COATED ORAL at 07:19

## 2023-03-24 RX ADMIN — Medication 1 PACKET(S): at 12:03

## 2023-03-24 RX ADMIN — Medication 20 MILLIGRAM(S): at 12:03

## 2023-03-24 RX ADMIN — Medication 0.5 MILLIGRAM(S): at 07:18

## 2023-03-24 RX ADMIN — Medication 40 MILLIEQUIVALENT(S): at 18:00

## 2023-03-24 RX ADMIN — Medication 1 TABLET(S): at 12:03

## 2023-03-24 RX ADMIN — Medication 20 MILLIGRAM(S): at 07:18

## 2023-03-24 RX ADMIN — Medication 0.5 MILLIGRAM(S): at 18:00

## 2023-03-24 RX ADMIN — SODIUM CHLORIDE 2 GRAM(S): 9 INJECTION INTRAMUSCULAR; INTRAVENOUS; SUBCUTANEOUS at 22:40

## 2023-03-24 RX ADMIN — Medication 60 MILLIGRAM(S): at 07:19

## 2023-03-24 RX ADMIN — SODIUM CHLORIDE 2 GRAM(S): 9 INJECTION INTRAMUSCULAR; INTRAVENOUS; SUBCUTANEOUS at 07:18

## 2023-03-24 RX ADMIN — ENOXAPARIN SODIUM 40 MILLIGRAM(S): 100 INJECTION SUBCUTANEOUS at 18:07

## 2023-03-24 RX ADMIN — CHLORHEXIDINE GLUCONATE 1 APPLICATION(S): 213 SOLUTION TOPICAL at 12:15

## 2023-03-24 NOTE — PROGRESS NOTE ADULT - ATTENDING COMMENTS
Hyponatremia    Na improved today, cont salt tabs and lasix po.  Will sign off for now, please call with questions

## 2023-03-24 NOTE — PROGRESS NOTE ADULT - PROBLEM SELECTOR PLAN 1
Pt with hyponatremia of unclear etiology but she is acutely hospitalized and has a hx of cancer. SNa was 132 on admission, decreased to 125 on 3/20/23. Serum osm: 267, urine osm: 505, urine Na: 8. 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 Received hypertonic saline (1.5% 30cc/hr for 6 hours) on 3/23, SNa went from 126 to ?141, stat repeat labs demonstrated a SNa of 130. Recommend continuing with fluid restriction. Continue furosemide 20mg daily. Continue salt tabs. Optimize glucose control. Monitor SNa.

## 2023-03-24 NOTE — PROGRESS NOTE ADULT - PROBLEM SELECTOR PLAN 2
- found to have worsened to 126 on 3/23 from 132 on 3/20. Now improving   - urine na: 88, urine osm 505   - TSH WNL   - AM cortisol: slightly elevated   - despite holding HCTZ pt still dropped Na   - fluid restrict and continue salt tabs to address SIADH  - nephrology consult appreciated, lasix added

## 2023-03-24 NOTE — CHART NOTE - NSCHARTNOTEFT_GEN_A_CORE
Patient w/ hyponatremia to 126 now s/p NaCl 1.5% x6hr and BMP results of Na 141 and K 2.8. Results appear irregular, likely drawn in IVF. STAT BMP repeated and w/ more accurate results Na of 130 and K of 3.2. Will supplement K and Phos. Will continue to monitor Na. CW salt tabs and fluid restriction. FU next BMP in 6 hours. Renal following, recs appreciated.     03-24    130<L>  |  91<L>  |  11  ----------------------------<  102<H>  3.2<L>   |  28  |  0.60    Ca    8.8      24 Mar 2023 01:48  Phos  2.1     03-24  Mg     1.70     03-24

## 2023-03-24 NOTE — PROGRESS NOTE ADULT - PROBLEM SELECTOR PLAN 3
goal BPs can be 140-150 systolic given age   C/w metoprolol daily   Holding hctz for now.  DC hydral   losartan added and maxed out   can titrate nifedipine and titrate as needed    AVOID IV antihypertensives

## 2023-03-25 LAB
ANION GAP SERPL CALC-SCNC: 11 MMOL/L — SIGNIFICANT CHANGE UP (ref 7–14)
BUN SERPL-MCNC: 15 MG/DL — SIGNIFICANT CHANGE UP (ref 7–23)
CALCIUM SERPL-MCNC: 9.3 MG/DL — SIGNIFICANT CHANGE UP (ref 8.4–10.5)
CHLORIDE SERPL-SCNC: 95 MMOL/L — LOW (ref 98–107)
CO2 SERPL-SCNC: 29 MMOL/L — SIGNIFICANT CHANGE UP (ref 22–31)
CREAT SERPL-MCNC: 0.67 MG/DL — SIGNIFICANT CHANGE UP (ref 0.5–1.3)
EGFR: 87 ML/MIN/1.73M2 — SIGNIFICANT CHANGE UP
GLUCOSE SERPL-MCNC: 94 MG/DL — SIGNIFICANT CHANGE UP (ref 70–99)
HCT VFR BLD CALC: 42.6 % — SIGNIFICANT CHANGE UP (ref 34.5–45)
HGB BLD-MCNC: 14.7 G/DL — SIGNIFICANT CHANGE UP (ref 11.5–15.5)
MAGNESIUM SERPL-MCNC: 1.7 MG/DL — SIGNIFICANT CHANGE UP (ref 1.6–2.6)
MCHC RBC-ENTMCNC: 31.7 PG — SIGNIFICANT CHANGE UP (ref 27–34)
MCHC RBC-ENTMCNC: 34.5 GM/DL — SIGNIFICANT CHANGE UP (ref 32–36)
MCV RBC AUTO: 92 FL — SIGNIFICANT CHANGE UP (ref 80–100)
NRBC # BLD: 0 /100 WBCS — SIGNIFICANT CHANGE UP (ref 0–0)
NRBC # FLD: 0 K/UL — SIGNIFICANT CHANGE UP (ref 0–0)
PHOSPHATE SERPL-MCNC: 1.9 MG/DL — LOW (ref 2.5–4.5)
PLATELET # BLD AUTO: 268 K/UL — SIGNIFICANT CHANGE UP (ref 150–400)
POTASSIUM SERPL-MCNC: 4.1 MMOL/L — SIGNIFICANT CHANGE UP (ref 3.5–5.3)
POTASSIUM SERPL-SCNC: 4.1 MMOL/L — SIGNIFICANT CHANGE UP (ref 3.5–5.3)
RBC # BLD: 4.63 M/UL — SIGNIFICANT CHANGE UP (ref 3.8–5.2)
RBC # FLD: 12.6 % — SIGNIFICANT CHANGE UP (ref 10.3–14.5)
SODIUM SERPL-SCNC: 135 MMOL/L — SIGNIFICANT CHANGE UP (ref 135–145)
WBC # BLD: 8.06 K/UL — SIGNIFICANT CHANGE UP (ref 3.8–10.5)
WBC # FLD AUTO: 8.06 K/UL — SIGNIFICANT CHANGE UP (ref 3.8–10.5)

## 2023-03-25 PROCEDURE — 99232 SBSQ HOSP IP/OBS MODERATE 35: CPT

## 2023-03-25 RX ORDER — POTASSIUM PHOSPHATE, MONOBASIC POTASSIUM PHOSPHATE, DIBASIC 236; 224 MG/ML; MG/ML
15 INJECTION, SOLUTION INTRAVENOUS ONCE
Refills: 0 | Status: COMPLETED | OUTPATIENT
Start: 2023-03-25 | End: 2023-03-25

## 2023-03-25 RX ADMIN — CHLORHEXIDINE GLUCONATE 1 APPLICATION(S): 213 SOLUTION TOPICAL at 13:41

## 2023-03-25 RX ADMIN — LOSARTAN POTASSIUM 100 MILLIGRAM(S): 100 TABLET, FILM COATED ORAL at 05:00

## 2023-03-25 RX ADMIN — Medication 20 MILLIGRAM(S): at 05:00

## 2023-03-25 RX ADMIN — Medication 60 MILLIGRAM(S): at 05:00

## 2023-03-25 RX ADMIN — POTASSIUM PHOSPHATE, MONOBASIC POTASSIUM PHOSPHATE, DIBASIC 62.5 MILLIMOLE(S): 236; 224 INJECTION, SOLUTION INTRAVENOUS at 15:16

## 2023-03-25 RX ADMIN — SODIUM CHLORIDE 2 GRAM(S): 9 INJECTION INTRAMUSCULAR; INTRAVENOUS; SUBCUTANEOUS at 21:13

## 2023-03-25 RX ADMIN — Medication 1 TABLET(S): at 13:42

## 2023-03-25 RX ADMIN — Medication 0.5 MILLIGRAM(S): at 05:00

## 2023-03-25 RX ADMIN — SODIUM CHLORIDE 2 GRAM(S): 9 INJECTION INTRAMUSCULAR; INTRAVENOUS; SUBCUTANEOUS at 05:00

## 2023-03-25 RX ADMIN — SODIUM CHLORIDE 2 GRAM(S): 9 INJECTION INTRAMUSCULAR; INTRAVENOUS; SUBCUTANEOUS at 13:42

## 2023-03-25 RX ADMIN — Medication 200 MILLIGRAM(S): at 05:00

## 2023-03-25 RX ADMIN — ENOXAPARIN SODIUM 40 MILLIGRAM(S): 100 INJECTION SUBCUTANEOUS at 18:33

## 2023-03-25 RX ADMIN — Medication 0.5 MILLIGRAM(S): at 18:34

## 2023-03-25 RX ADMIN — Medication 20 MILLIGRAM(S): at 13:42

## 2023-03-26 LAB
ANION GAP SERPL CALC-SCNC: 8 MMOL/L — SIGNIFICANT CHANGE UP (ref 7–14)
BUN SERPL-MCNC: 18 MG/DL — SIGNIFICANT CHANGE UP (ref 7–23)
CALCIUM SERPL-MCNC: 9.5 MG/DL — SIGNIFICANT CHANGE UP (ref 8.4–10.5)
CHLORIDE SERPL-SCNC: 96 MMOL/L — LOW (ref 98–107)
CO2 SERPL-SCNC: 30 MMOL/L — SIGNIFICANT CHANGE UP (ref 22–31)
CREAT SERPL-MCNC: 0.75 MG/DL — SIGNIFICANT CHANGE UP (ref 0.5–1.3)
EGFR: 79 ML/MIN/1.73M2 — SIGNIFICANT CHANGE UP
GLUCOSE SERPL-MCNC: 112 MG/DL — HIGH (ref 70–99)
HCT VFR BLD CALC: 44 % — SIGNIFICANT CHANGE UP (ref 34.5–45)
HGB BLD-MCNC: 14.7 G/DL — SIGNIFICANT CHANGE UP (ref 11.5–15.5)
MAGNESIUM SERPL-MCNC: 1.8 MG/DL — SIGNIFICANT CHANGE UP (ref 1.6–2.6)
MCHC RBC-ENTMCNC: 31.1 PG — SIGNIFICANT CHANGE UP (ref 27–34)
MCHC RBC-ENTMCNC: 33.4 GM/DL — SIGNIFICANT CHANGE UP (ref 32–36)
MCV RBC AUTO: 93 FL — SIGNIFICANT CHANGE UP (ref 80–100)
NRBC # BLD: 0 /100 WBCS — SIGNIFICANT CHANGE UP (ref 0–0)
NRBC # FLD: 0 K/UL — SIGNIFICANT CHANGE UP (ref 0–0)
PHOSPHATE SERPL-MCNC: 2.8 MG/DL — SIGNIFICANT CHANGE UP (ref 2.5–4.5)
PLATELET # BLD AUTO: 287 K/UL — SIGNIFICANT CHANGE UP (ref 150–400)
POTASSIUM SERPL-MCNC: 4 MMOL/L — SIGNIFICANT CHANGE UP (ref 3.5–5.3)
POTASSIUM SERPL-SCNC: 4 MMOL/L — SIGNIFICANT CHANGE UP (ref 3.5–5.3)
RBC # BLD: 4.73 M/UL — SIGNIFICANT CHANGE UP (ref 3.8–5.2)
RBC # FLD: 12.5 % — SIGNIFICANT CHANGE UP (ref 10.3–14.5)
SARS-COV-2 RNA SPEC QL NAA+PROBE: SIGNIFICANT CHANGE UP
SODIUM SERPL-SCNC: 134 MMOL/L — LOW (ref 135–145)
WBC # BLD: 9.54 K/UL — SIGNIFICANT CHANGE UP (ref 3.8–10.5)
WBC # FLD AUTO: 9.54 K/UL — SIGNIFICANT CHANGE UP (ref 3.8–10.5)

## 2023-03-26 PROCEDURE — 99232 SBSQ HOSP IP/OBS MODERATE 35: CPT

## 2023-03-26 RX ADMIN — Medication 60 MILLIGRAM(S): at 06:50

## 2023-03-26 RX ADMIN — Medication 1 TABLET(S): at 11:58

## 2023-03-26 RX ADMIN — Medication 0.5 MILLIGRAM(S): at 06:49

## 2023-03-26 RX ADMIN — LOSARTAN POTASSIUM 100 MILLIGRAM(S): 100 TABLET, FILM COATED ORAL at 06:49

## 2023-03-26 RX ADMIN — SODIUM CHLORIDE 2 GRAM(S): 9 INJECTION INTRAMUSCULAR; INTRAVENOUS; SUBCUTANEOUS at 06:50

## 2023-03-26 RX ADMIN — Medication 650 MILLIGRAM(S): at 23:30

## 2023-03-26 RX ADMIN — CHLORHEXIDINE GLUCONATE 1 APPLICATION(S): 213 SOLUTION TOPICAL at 11:53

## 2023-03-26 RX ADMIN — Medication 0.5 MILLIGRAM(S): at 17:58

## 2023-03-26 RX ADMIN — ENOXAPARIN SODIUM 40 MILLIGRAM(S): 100 INJECTION SUBCUTANEOUS at 17:59

## 2023-03-26 RX ADMIN — SODIUM CHLORIDE 2 GRAM(S): 9 INJECTION INTRAMUSCULAR; INTRAVENOUS; SUBCUTANEOUS at 17:58

## 2023-03-26 RX ADMIN — Medication 20 MILLIGRAM(S): at 11:58

## 2023-03-26 RX ADMIN — SODIUM CHLORIDE 2 GRAM(S): 9 INJECTION INTRAMUSCULAR; INTRAVENOUS; SUBCUTANEOUS at 22:38

## 2023-03-26 RX ADMIN — Medication 650 MILLIGRAM(S): at 22:36

## 2023-03-26 RX ADMIN — Medication 200 MILLIGRAM(S): at 06:49

## 2023-03-26 RX ADMIN — Medication 20 MILLIGRAM(S): at 06:49

## 2023-03-26 NOTE — PROGRESS NOTE ADULT - PROBLEM SELECTOR PLAN 1
Likely related to acute infection vs hyponatremia. Could be due to benzo withdrawal or delirium as well   - s/p 5 days of ceftriaxone   - hyponatremia managed as below   - made home clonazepam standing to deal with any withdrawal component    - patient now at baseline mental status and pending rehab placement

## 2023-03-26 NOTE — PROGRESS NOTE ADULT - PROBLEM SELECTOR PLAN 2
- found to have worsened to 126 on 3/23 from 132 on 3/20. Now improving   - urine na: 88, urine osm 505   - TSH WNL   - AM cortisol: slightly elevated   - despite holding HCTZ pt still dropped Na   - fluid restrict and continue salt tabs to address SIADH  - nephrology consult appreciated, lasix 20 mg daily added  - now resolved

## 2023-03-26 NOTE — PROGRESS NOTE ADULT - SUBJECTIVE AND OBJECTIVE BOX
[facility] DIVISION OF KIDNEY DISEASES AND HYPERTENSION   FOLLOW UP NOTE  --------------------------------------------------------------------------------  Chief Complaint: Hyponatremia     24 hour events/subjective: Pt. was seen and examined today. She wanted to sleep and not be bothered.     PAST HISTORY  --------------------------------------------------------------------------------  No significant changes to PMH, PSH, FHx, SHx, unless otherwise noted    ALLERGIES & MEDICATIONS  --------------------------------------------------------------------------------  Allergies  No Known Allergies      Standing Inpatient Medications  chlorhexidine 2% Cloths 1 Application(s) Topical daily  clonazePAM  Tablet 0.5 milliGRAM(s) Oral two times a day  enoxaparin Injectable 40 milliGRAM(s) SubCutaneous every 24 hours  FLUoxetine 20 milliGRAM(s) Oral daily  furosemide    Tablet 20 milliGRAM(s) Oral daily  latanoprost 0.005% Ophthalmic Solution 1 Drop(s) Both EYES at bedtime  losartan 100 milliGRAM(s) Oral daily  melatonin 6 milliGRAM(s) Oral once  metoprolol succinate  milliGRAM(s) Oral daily  multivitamin 1 Tablet(s) Oral daily  NIFEdipine XL 30 milliGRAM(s) Oral daily  sodium chloride 2 Gram(s) Oral three times a day    PRN Inpatient Medications  acetaminophen     Tablet .. 650 milliGRAM(s) Oral every 6 hours PRN  albuterol    90 MICROgram(s) HFA Inhaler 2 Puff(s) Inhalation every 6 hours PRN    REVIEW OF SYSTEMS  --------------------------------------------------------------------------------  Limited ROS, see HPI     VITALS/PHYSICAL EXAM  --------------------------------------------------------------------------------  T(C): 36.9 (03-23-23 @ 04:43), Max: 37.2 (03-22-23 @ 21:45)  HR: 71 (03-23-23 @ 04:43) (61 - 80)  BP: 167/70 (03-23-23 @ 04:43) (167/70 - 200/82)  RR: 18 (03-23-23 @ 04:43) (18 - 18)  SpO2: 97% (03-23-23 @ 04:43) (96% - 97%)  Wt(kg): --    Physical Exam:  	Gen: Laying in bed in no overt distress  	HEENT: Anicteric  	Pulm: Diminished bilaterally   	CV: S1S2+  	Abd: Soft, +BS    	Ext: No LE edema B/L  	Neuro: Awake  	Skin: Warm and dry    LABS/STUDIES  --------------------------------------------------------------------------------              15.3   10.26 >-----------<  281      [03-23-23 @ 04:03]              44.5     126  |  89  |  14  ----------------------------<  107      [03-23-23 @ 04:03]  3.6   |  26  |  0.58        Ca     9.3     [03-23-23 @ 04:03]      Mg     1.90     [03-23-23 @ 04:03]      Phos  2.0     [03-23-23 @ 04:03]    Creatinine Trend:  SCr 0.58 [03-23 @ 04:03]  SCr 0.60 [03-22 @ 06:20]  SCr 0.64 [03-21 @ 11:17]  SCr 0.62 [03-21 @ 08:26]  SCr 0.67 [03-20 @ 09:20]
[facility] DIVISION OF KIDNEY DISEASES AND HYPERTENSION   FOLLOW UP NOTE  --------------------------------------------------------------------------------  Chief Complaint: Hyponatremia     24 hour events/subjective: Pt. was seen and examined today. She again was sleeping but was able to answer simple questions. Denied shortness of breath or leg swelling.     PAST HISTORY  --------------------------------------------------------------------------------  No significant changes to PMH, PSH, FHx, SHx, unless otherwise noted    ALLERGIES & MEDICATIONS  --------------------------------------------------------------------------------  Allergies  No Known Allergies    Standing Inpatient Medications  chlorhexidine 2% Cloths 1 Application(s) Topical daily  clonazePAM  Tablet 0.5 milliGRAM(s) Oral two times a day  enoxaparin Injectable 40 milliGRAM(s) SubCutaneous every 24 hours  FLUoxetine 20 milliGRAM(s) Oral daily  furosemide    Tablet 20 milliGRAM(s) Oral daily  latanoprost 0.005% Ophthalmic Solution 1 Drop(s) Both EYES at bedtime  losartan 100 milliGRAM(s) Oral daily  melatonin 6 milliGRAM(s) Oral once  metoprolol succinate  milliGRAM(s) Oral daily  multivitamin 1 Tablet(s) Oral daily  NIFEdipine XL 60 milliGRAM(s) Oral daily  potassium chloride    Tablet ER 40 milliEquivalent(s) Oral every 4 hours  potassium phosphate / sodium phosphate Powder (PHOS-NaK) 1 Packet(s) Oral once  sodium chloride 2 Gram(s) Oral three times a day    PRN Inpatient Medications  acetaminophen     Tablet .. 650 milliGRAM(s) Oral every 6 hours PRN  albuterol    90 MICROgram(s) HFA Inhaler 2 Puff(s) Inhalation every 6 hours PRN    REVIEW OF SYSTEMS  --------------------------------------------------------------------------------  Limited ROS, see HPI    VITALS/PHYSICAL EXAM  --------------------------------------------------------------------------------  T(C): 36.7 (03-24-23 @ 07:10), Max: 36.9 (03-23-23 @ 10:00)  HR: 78 (03-24-23 @ 07:10) (64 - 80)  BP: 165/69 (03-24-23 @ 07:10) (139/67 - 174/70)  RR: 18 (03-24-23 @ 07:10) (18 - 18)  SpO2: 95% (03-24-23 @ 07:10) (92% - 98%)  Wt(kg): --    Physical Exam:  	Gen: Laying in bed in no overt distress  	HEENT: Anicteric  	Pulm: Diminished bilaterally   	CV: S1S2+  	Abd: Soft, +BS    	Ext: No LE edema B/L  	Neuro: Awake  	Skin: Warm and dry    LABS/STUDIES  --------------------------------------------------------------------------------              15.3   10.26 >-----------<  281      [03-23-23 @ 04:03]              44.5     130  |  91  |  11  ----------------------------<  102      [03-24-23 @ 01:48]  3.2   |  28  |  0.60        Ca     8.8     [03-24-23 @ 01:48]      Mg     1.70     [03-24-23 @ 01:48]      Phos  2.1     [03-24-23 @ 01:48]    Creatinine Trend:  SCr 0.60 [03-24 @ 01:48]  SCr 0.52 [03-24 @ 00:52]  SCr 0.58 [03-23 @ 04:03]  SCr 0.60 [03-22 @ 06:20]  SCr 0.64 [03-21 @ 11:17]
MD [facility] Division of Hospital Medicine  Pager: [phone]  Available via MS Teams    SUBJECTIVE / OVERNIGHT EVENTS:    No new complaints     MEDICATIONS  (STANDING):  chlorhexidine 2% Cloths 1 Application(s) Topical daily  clonazePAM  Tablet 0.5 milliGRAM(s) Oral two times a day  enoxaparin Injectable 40 milliGRAM(s) SubCutaneous every 24 hours  FLUoxetine 20 milliGRAM(s) Oral daily  meclizine 12.5 milliGRAM(s) Oral once  melatonin 6 milliGRAM(s) Oral once  metoprolol succinate  milliGRAM(s) Oral daily  multivitamin 1 Tablet(s) Oral daily  sodium chloride 2 Gram(s) Oral three times a day  sodium chloride 0.9%. 1000 milliLiter(s) (100 mL/Hr) IV Continuous <Continuous>    MEDICATIONS  (PRN):  acetaminophen     Tablet .. 650 milliGRAM(s) Oral every 6 hours PRN Mild Pain (1 - 3), Moderate Pain (4 - 6)  albuterol    90 MICROgram(s) HFA Inhaler 2 Puff(s) Inhalation every 6 hours PRN Shortness of Breath and/or Wheezing      I&O's Summary    21 Mar 2023 07:01  -  22 Mar 2023 07:00  --------------------------------------------------------  IN: 720 mL / OUT: 300 mL / NET: 420 mL        PHYSICAL EXAM:  Vital Signs Last 24 Hrs  T(C): 36.5 (22 Mar 2023 11:40), Max: 37.2 (22 Mar 2023 10:00)  T(F): 97.7 (22 Mar 2023 11:40), Max: 98.9 (22 Mar 2023 10:00)  HR: 67 (22 Mar 2023 11:40) (64 - 83)  BP: 155/63 (22 Mar 2023 11:40) (150/50 - 193/74)  BP(mean): --  RR: 18 (22 Mar 2023 11:40) (17 - 18)  SpO2: 97% (22 Mar 2023 11:40) (93% - 97%)    Parameters below as of 22 Mar 2023 11:40  Patient On (Oxygen Delivery Method): room air      CONSTITUTIONAL: NAD, well-developed   EYES: conjunctiva and sclera clear  ENMT: Moist oral mucosa   NECK: Supple   RESPIRATORY: Normal respiratory effort; lungs are clear to auscultation bilaterally  CARDIOVASCULAR: Regular rate and rhythm, normal S1 and S2, no murmur/rub/gallop; Peripheral pulses are 2+ bilaterally  ABDOMEN: Nontender to palpation, normoactive bowel sounds, no rebound/guarding   MUSCULOSKELETAL: No lower extremity edema   PSYCH: A+O to person, place, and time; affect appropriate  NEUROLOGY: no gross sensory or motor deficits   SKIN: No rashes    LABS:                        15.2   11.59 )-----------( 304      ( 22 Mar 2023 06:20 )             44.8     03-22    128<L>  |  89<L>  |  11  ----------------------------<  120<H>  3.5   |  29  |  0.60    Ca    9.0      22 Mar 2023 06:20  Phos  2.5     03-22  Mg     1.90     03-22                COVID-19 PCR: NotDetec (21 Mar 2023 07:20)      RADIOLOGY & ADDITIONAL TESTS:  Other Results Reviewed Today: BMP with stable Cr, CBC with stable Hg     COORDINATION OF CARE:  Communication: discussed plan of care with ACP 
Niki Alcaraz MD  [facility] Division of Hospital Medicine  Pager: [phone]  Available via MS Teams    SUBJECTIVE / OVERNIGHT EVENTS:    Complains of chronic dizziness     MEDICATIONS  (STANDING):  chlorhexidine 2% Cloths 1 Application(s) Topical daily  clonazePAM  Tablet 0.5 milliGRAM(s) Oral two times a day  enoxaparin Injectable 40 milliGRAM(s) SubCutaneous every 24 hours  FLUoxetine 20 milliGRAM(s) Oral daily  furosemide    Tablet 20 milliGRAM(s) Oral daily  latanoprost 0.005% Ophthalmic Solution 1 Drop(s) Both EYES at bedtime  losartan 100 milliGRAM(s) Oral daily  melatonin 6 milliGRAM(s) Oral once  metoprolol succinate  milliGRAM(s) Oral daily  multivitamin 1 Tablet(s) Oral daily  NIFEdipine XL 60 milliGRAM(s) Oral daily  potassium phosphate / sodium phosphate Powder (PHOS-NaK) 1 Packet(s) Oral once  sodium chloride 2 Gram(s) Oral three times a day    MEDICATIONS  (PRN):  acetaminophen     Tablet .. 650 milliGRAM(s) Oral every 6 hours PRN Mild Pain (1 - 3), Moderate Pain (4 - 6)  albuterol    90 MICROgram(s) HFA Inhaler 2 Puff(s) Inhalation every 6 hours PRN Shortness of Breath and/or Wheezing      I&O's Summary      PHYSICAL EXAM:  Vital Signs Last 24 Hrs  T(C): 36.7 (25 Mar 2023 14:49), Max: 36.7 (25 Mar 2023 14:49)  T(F): 98 (25 Mar 2023 14:49), Max: 98 (25 Mar 2023 14:49)  HR: 88 (25 Mar 2023 14:49) (88 - 88)  BP: 149/68 (25 Mar 2023 14:49) (149/68 - 149/68)  BP(mean): --  RR: 20 (25 Mar 2023 14:49) (20 - 20)  SpO2: 95% (25 Mar 2023 14:49) (95% - 95%)    Parameters below as of 25 Mar 2023 14:49  Patient On (Oxygen Delivery Method): room air      CONSTITUTIONAL: NAD, well-developed   EYES: conjunctiva and sclera clear  ENMT: Moist oral mucosa   NECK: Supple   RESPIRATORY: Normal respiratory effort; lungs are clear to auscultation bilaterally  CARDIOVASCULAR: Regular rate and rhythm, normal S1 and S2, no murmur/rub/gallop; Peripheral pulses are 2+ bilaterally  ABDOMEN: Nontender to palpation, normoactive bowel sounds, no rebound/guarding   MUSCULOSKELETAL: No lower extremity edema   PSYCH: A+O to person, place, and time; affect appropriate  NEUROLOGY: no gross sensory or motor deficits   SKIN: No rashes    LABS:                        14.7   8.06  )-----------( 268      ( 25 Mar 2023 07:00 )             42.6     03-25    135  |  95<L>  |  15  ----------------------------<  94  4.1   |  29  |  0.67    Ca    9.3      25 Mar 2023 07:00  Phos  1.9     03-25  Mg     1.70     03-25                COVID-19 PCR: NotDetec (21 Mar 2023 07:20)      RADIOLOGY & ADDITIONAL TESTS:  Other Results Reviewed Today: BMP with stable Cr, CBC with stable Hg     COORDINATION OF CARE:  Communication: discussed plan of care with ACP 
Niki Alcaraz MD  [facility] Division of Hospital Medicine  Pager: [phone]  Available via MS Teams    SUBJECTIVE / OVERNIGHT EVENTS:    Patient without any complaints     MEDICATIONS  (STANDING):  chlorhexidine 2% Cloths 1 Application(s) Topical daily  clonazePAM  Tablet 0.5 milliGRAM(s) Oral two times a day  enoxaparin Injectable 40 milliGRAM(s) SubCutaneous every 24 hours  FLUoxetine 20 milliGRAM(s) Oral daily  furosemide    Tablet 20 milliGRAM(s) Oral daily  latanoprost 0.005% Ophthalmic Solution 1 Drop(s) Both EYES at bedtime  losartan 100 milliGRAM(s) Oral daily  melatonin 6 milliGRAM(s) Oral once  metoprolol succinate  milliGRAM(s) Oral daily  multivitamin 1 Tablet(s) Oral daily  NIFEdipine XL 60 milliGRAM(s) Oral daily  potassium chloride    Tablet ER 40 milliEquivalent(s) Oral every 4 hours  potassium phosphate / sodium phosphate Powder (PHOS-NaK) 1 Packet(s) Oral once  sodium chloride 2 Gram(s) Oral three times a day    MEDICATIONS  (PRN):  acetaminophen     Tablet .. 650 milliGRAM(s) Oral every 6 hours PRN Mild Pain (1 - 3), Moderate Pain (4 - 6)  albuterol    90 MICROgram(s) HFA Inhaler 2 Puff(s) Inhalation every 6 hours PRN Shortness of Breath and/or Wheezing      I&O's Summary      PHYSICAL EXAM:  Vital Signs Last 24 Hrs  T(C): 36.8 (24 Mar 2023 10:37), Max: 36.9 (24 Mar 2023 04:25)  T(F): 98.2 (24 Mar 2023 10:37), Max: 98.5 (24 Mar 2023 04:25)  HR: 78 (24 Mar 2023 10:37) (72 - 78)  BP: 157/73 (24 Mar 2023 10:37) (139/67 - 167/70)  BP(mean): --  RR: 18 (24 Mar 2023 10:37) (18 - 18)  SpO2: 95% (24 Mar 2023 10:37) (92% - 95%)    Parameters below as of 24 Mar 2023 10:37  Patient On (Oxygen Delivery Method): room air      CONSTITUTIONAL: NAD, well-developed   EYES: conjunctiva and sclera clear  ENMT: Moist oral mucosa   NECK: Supple   RESPIRATORY: Normal respiratory effort; lungs are clear to auscultation bilaterally  CARDIOVASCULAR: Regular rate and rhythm, normal S1 and S2, no murmur/rub/gallop; Peripheral pulses are 2+ bilaterally  ABDOMEN: Nontender to palpation, normoactive bowel sounds, no rebound/guarding   MUSCULOSKELETAL: No lower extremity edema   PSYCH: A+O to person, place, and time; affect appropriate  NEUROLOGY: no gross sensory or motor deficits   SKIN: No rashes    LABS:                        15.5   9.14  )-----------( 263      ( 24 Mar 2023 09:45 )             45.2     03-24    130<L>  |  90<L>  |  12  ----------------------------<  99  3.2<L>   |  28  |  0.63    Ca    9.0      24 Mar 2023 09:45  Phos  2.3     03-24  Mg     1.70     03-24                COVID-19 PCR: NotDetec (21 Mar 2023 07:20)      RADIOLOGY & ADDITIONAL TESTS:  Other Results Reviewed Today: BMP with stable Cr, CBC with stable Hg     COORDINATION OF CARE:  Communication: discussed plan of care with ACP 
Niki Alcaraz MD  [facility] of [facility] Medicine  Pager: [phone]  Available via MS Teams    SUBJECTIVE / OVERNIGHT EVENTS:    No new complaints this morning     MEDICATIONS  (STANDING):  chlorhexidine 2% Cloths 1 Application(s) Topical daily  clonazePAM  Tablet 0.5 milliGRAM(s) Oral two times a day  enoxaparin Injectable 40 milliGRAM(s) SubCutaneous every 24 hours  FLUoxetine 20 milliGRAM(s) Oral daily  furosemide    Tablet 20 milliGRAM(s) Oral daily  latanoprost 0.005% Ophthalmic Solution 1 Drop(s) Both EYES at bedtime  losartan 100 milliGRAM(s) Oral daily  melatonin 6 milliGRAM(s) Oral once  metoprolol succinate  milliGRAM(s) Oral daily  multivitamin 1 Tablet(s) Oral daily  NIFEdipine XL 60 milliGRAM(s) Oral daily  sodium chloride 2 Gram(s) Oral three times a day    MEDICATIONS  (PRN):  acetaminophen     Tablet .. 650 milliGRAM(s) Oral every 6 hours PRN Mild Pain (1 - 3), Moderate Pain (4 - 6)  albuterol    90 MICROgram(s) HFA Inhaler 2 Puff(s) Inhalation every 6 hours PRN Shortness of Breath and/or Wheezing      I&O's Summary      PHYSICAL EXAM:  Vital Signs Last 24 Hrs  T(C): 36.4 (23 Mar 2023 12:43), Max: 37.2 (22 Mar 2023 21:45)  T(F): 97.6 (23 Mar 2023 12:43), Max: 98.9 (22 Mar 2023 21:45)  HR: 80 (23 Mar 2023 12:43) (61 - 80)  BP: 167/74 (23 Mar 2023 12:43) (167/70 - 200/82)  BP(mean): --  RR: 18 (23 Mar 2023 12:43) (18 - 18)  SpO2: 95% (23 Mar 2023 12:43) (95% - 98%)    Parameters below as of 23 Mar 2023 12:43  Patient On (Oxygen Delivery Method): room air      CONSTITUTIONAL: NAD, well-developed   EYES: conjunctiva and sclera clear  ENMT: Moist oral mucosa   NECK: Supple   RESPIRATORY: Normal respiratory effort; lungs are clear to auscultation bilaterally  CARDIOVASCULAR: Regular rate and rhythm, normal S1 and S2, no murmur/rub/gallop; Peripheral pulses are 2+ bilaterally  ABDOMEN: Nontender to palpation, normoactive bowel sounds, no rebound/guarding   MUSCULOSKELETAL: No lower extremity edema   PSYCH: A+O to person, place, and time; affect appropriate  NEUROLOGY: no gross sensory or motor deficits   SKIN: No rashes    LABS:                        15.3   10.26 )-----------( 281      ( 23 Mar 2023 04:03 )             44.5     03-23    126<L>  |  89<L>  |  14  ----------------------------<  107<H>  3.6   |  26  |  0.58    Ca    9.3      23 Mar 2023 04:03  Phos  2.0     03-23  Mg     1.90     03-23                COVID-19 PCR: NotDetec (21 Mar 2023 07:20)      RADIOLOGY & ADDITIONAL TESTS:  Other Results Reviewed Today: BMP with stable Cr, CBC with stable Hg     COORDINATION OF CARE:  Communication: discussed plan of care with ACP 
[facility] Division of Hospital Medicine  Asael Schwarz DO  Available via MS Teams  In house pager [phone]    SUBJECTIVE / OVERNIGHT EVENTS:  No acute events overnight.  No acute complaints. Says she feels fine. Is asking whether she can go home.  Says she is not in a hospital, but does not know where she is - says she is not at home. Says she lives with her .    ADDITIONAL REVIEW OF SYSTEMS:    MEDICATIONS  (STANDING):  cefTRIAXone   IVPB 1000 milliGRAM(s) IV Intermittent every 24 hours  chlorhexidine 2% Cloths 1 Application(s) Topical daily  FLUoxetine 20 milliGRAM(s) Oral daily  metoprolol succinate  milliGRAM(s) Oral daily  multivitamin 1 Tablet(s) Oral daily  sodium chloride 0.9%. 1000 milliLiter(s) (100 mL/Hr) IV Continuous <Continuous>    MEDICATIONS  (PRN):  acetaminophen     Tablet .. 650 milliGRAM(s) Oral every 6 hours PRN Mild Pain (1 - 3), Moderate Pain (4 - 6)  clonazePAM  Tablet 0.5 milliGRAM(s) Oral three times a day PRN Anxiety  hydrALAZINE 10 milliGRAM(s) Oral three times a day PRN Systolic blood pressure >150      I&O's Summary      PHYSICAL EXAM:  Vital Signs Last 24 Hrs  T(C): 36.9 (18 Mar 2023 05:59), Max: 37.2 (17 Mar 2023 21:21)  T(F): 98.5 (18 Mar 2023 05:59), Max: 99 (17 Mar 2023 21:21)  HR: 76 (18 Mar 2023 05:59) (68 - 76)  BP: 158/62 (18 Mar 2023 05:59) (127/55 - 166/70)  BP(mean): --  RR: 18 (18 Mar 2023 05:59) (17 - 18)  SpO2: 95% (18 Mar 2023 05:59) (95% - 99%)    Parameters below as of 18 Mar 2023 05:59  Patient On (Oxygen Delivery Method): room air      CONSTITUTIONAL: NAD, well-developed, well-groomed  EYES: PERRLA; conjunctiva and sclera clear  ENMT: Moist oral mucosa, no pharyngeal injection or exudates; normal dentition  NECK: Supple, no palpable masses; no thyromegaly  RESPIRATORY: Normal respiratory effort; lungs are clear to auscultation bilaterally  CARDIOVASCULAR: Regular rate and rhythm, normal S1 and S2, no murmur/rub/gallop; No lower extremity edema; Peripheral pulses are 2+ bilaterally  ABDOMEN: Nontender to palpation, normoactive bowel sounds, no rebound/guarding; No hepatosplenomegaly  MUSCULOSKELETAL:   no clubbing or cyanosis of digits; no joint swelling or tenderness to palpation  PSYCH: A+O to self but not to place or time; affect appropriate  NEUROLOGY: CN 2-12 are intact and symmetric; no gross sensory deficits   SKIN: No rashes; no palpable lesions    LABS:                        14.4   10.98 )-----------( 250      ( 18 Mar 2023 06:00 )             42.7     03-18    132<L>  |  92<L>  |  18  ----------------------------<  107<H>  3.3<L>   |  25  |  0.84    Ca    9.0      18 Mar 2023 06:00  Phos  2.5     03-18  Mg     2.00     03-18    TPro  7.5  /  Alb  4.1  /  TBili  0.8  /  DBili  x   /  AST  39<H>  /  ALT  33  /  AlkPhos  43  03-17    PT/INR - ( 17 Mar 2023 13:20 )   PT: 11.8 sec;   INR: 1.02 ratio         PTT - ( 17 Mar 2023 13:20 )  PTT:30.6 sec  CARDIAC MARKERS ( 17 Mar 2023 13:20 )  x     / x     / 73 U/L / x     / x          Urinalysis Basic - ( 17 Mar 2023 11:54 )    Color: Yellow / Appearance: Slightly Turbid / S.012 / pH: x  Gluc: x / Ketone: Negative  / Bili: Negative / Urobili: <2 mg/dL   Blood: x / Protein: Trace / Nitrite: Negative   Leuk Esterase: Large / RBC: 2 /HPF / WBC 26 /HPF   Sq Epi: x / Non Sq Epi: 13 /HPF / Bacteria: Moderate              COMMUNICATION:  Care Discussed with Consultants/Other Providers and Details of Discussion: d/w medicine NP
Niki Alcaraz MD  [facility]  Pager: [phone]  Available via MS Teams    SUBJECTIVE / OVERNIGHT EVENTS:    no new complaints     MEDICATIONS  (STANDING):  chlorhexidine 2% Cloths 1 Application(s) Topical daily  clonazePAM  Tablet 0.5 milliGRAM(s) Oral two times a day  enoxaparin Injectable 40 milliGRAM(s) SubCutaneous every 24 hours  FLUoxetine 20 milliGRAM(s) Oral daily  furosemide    Tablet 20 milliGRAM(s) Oral daily  latanoprost 0.005% Ophthalmic Solution 1 Drop(s) Both EYES at bedtime  losartan 100 milliGRAM(s) Oral daily  melatonin 6 milliGRAM(s) Oral once  metoprolol succinate  milliGRAM(s) Oral daily  multivitamin 1 Tablet(s) Oral daily  NIFEdipine XL 60 milliGRAM(s) Oral daily  potassium phosphate / sodium phosphate Powder (PHOS-NaK) 1 Packet(s) Oral once  sodium chloride 2 Gram(s) Oral three times a day    MEDICATIONS  (PRN):  acetaminophen     Tablet .. 650 milliGRAM(s) Oral every 6 hours PRN Mild Pain (1 - 3), Moderate Pain (4 - 6)  albuterol    90 MICROgram(s) HFA Inhaler 2 Puff(s) Inhalation every 6 hours PRN Shortness of Breath and/or Wheezing      I&O's Summary    25 Mar 2023 07:01  -  26 Mar 2023 07:00  --------------------------------------------------------  IN: 50 mL / OUT: 0 mL / NET: 50 mL        PHYSICAL EXAM:  Vital Signs Last 24 Hrs  T(C): 36.7 (26 Mar 2023 14:49), Max: 36.9 (26 Mar 2023 06:48)  T(F): 98 (26 Mar 2023 14:49), Max: 98.5 (26 Mar 2023 06:48)  HR: 75 (26 Mar 2023 14:49) (75 - 92)  BP: 149/66 (26 Mar 2023 14:49) (131/53 - 150/82)  BP(mean): --  RR: 18 (26 Mar 2023 14:49) (18 - 20)  SpO2: 99% (26 Mar 2023 14:49) (98% - 99%)    Parameters below as of 26 Mar 2023 14:49  Patient On (Oxygen Delivery Method): room air      CONSTITUTIONAL: NAD, well-developed   EYES: conjunctiva and sclera clear  ENMT: Moist oral mucosa   NECK: Supple   RESPIRATORY: Normal respiratory effort; lungs are clear to auscultation bilaterally  CARDIOVASCULAR: Regular rate and rhythm, normal S1 and S2, no murmur/rub/gallop; Peripheral pulses are 2+ bilaterally  ABDOMEN: Nontender to palpation, normoactive bowel sounds, no rebound/guarding   MUSCULOSKELETAL: No lower extremity edema   PSYCH: A+O to person, place, and time; affect appropriate  NEUROLOGY: no gross sensory or motor deficits   SKIN: No rashes    LABS:                        14.7   8.06  )-----------( 268      ( 25 Mar 2023 07:00 )             42.6     03-25    135  |  95<L>  |  15  ----------------------------<  94  4.1   |  29  |  0.67    Ca    9.3      25 Mar 2023 07:00  Phos  1.9     03-25  Mg     1.70     03-25                COVID-19 PCR: NotDetec (26 Mar 2023 08:45)  COVID-19 PCR: NotDetec (21 Mar 2023 07:20)      RADIOLOGY & ADDITIONAL TESTS:  Other Results Reviewed Today: BMP with stable Cr, CBC with stable Hg     COORDINATION OF CARE:  Communication: discussed plan of care with ACP 
Niki Alcaraz MD  [facility]  Pager: [phone]  Available via MS Teams    SUBJECTIVE / OVERNIGHT EVENTS:    Denies to have any complaints     MEDICATIONS  (STANDING):  chlorhexidine 2% Cloths 1 Application(s) Topical daily  FLUoxetine 20 milliGRAM(s) Oral daily  hydrALAZINE 25 milliGRAM(s) Oral three times a day  losartan 25 milliGRAM(s) Oral daily  metoprolol succinate  milliGRAM(s) Oral daily  multivitamin 1 Tablet(s) Oral daily  sodium chloride 0.9%. 1000 milliLiter(s) (100 mL/Hr) IV Continuous <Continuous>  sodium phosphate 15 milliMole(s)/250 mL IVPB 15 milliMole(s) IV Intermittent once    MEDICATIONS  (PRN):  acetaminophen     Tablet .. 650 milliGRAM(s) Oral every 6 hours PRN Mild Pain (1 - 3), Moderate Pain (4 - 6)  albuterol    90 MICROgram(s) HFA Inhaler 2 Puff(s) Inhalation every 6 hours PRN Shortness of Breath and/or Wheezing  clonazePAM  Tablet 0.5 milliGRAM(s) Oral every 8 hours PRN Anxiety      I&O's Summary    19 Mar 2023 07:01  -  20 Mar 2023 07:00  --------------------------------------------------------  IN: 900 mL / OUT: 0 mL / NET: 900 mL    20 Mar 2023 07:01  -  20 Mar 2023 15:32  --------------------------------------------------------  IN: 480 mL / OUT: 500 mL / NET: -20 mL        PHYSICAL EXAM:  Vital Signs Last 24 Hrs  T(C): 37.1 (20 Mar 2023 14:30), Max: 37.1 (20 Mar 2023 09:50)  T(F): 98.8 (20 Mar 2023 14:30), Max: 98.8 (20 Mar 2023 09:50)  HR: 70 (20 Mar 2023 14:30) (62 - 88)  BP: 140/50 (20 Mar 2023 14:30) (112/57 - 187/65)  BP(mean): --  RR: 18 (20 Mar 2023 14:30) (17 - 18)  SpO2: 95% (20 Mar 2023 14:30) (95% - 100%)    Parameters below as of 20 Mar 2023 14:30  Patient On (Oxygen Delivery Method): room air      CONSTITUTIONAL: NAD   EYES: conjunctiva and sclera clear  ENMT: Moist oral mucosa   NECK: Supple   RESPIRATORY: Normal respiratory effort; lungs are clear to auscultation bilaterally  CARDIOVASCULAR: Regular rate and rhythm, normal S1 and S2, no murmur/rub/gallop; Peripheral pulses are 2+ bilaterally  ABDOMEN: Nontender to palpation, normoactive bowel sounds, no rebound/guarding   MUSCULOSKELETAL: No lower extremity edema   PSYCH: A+O to person, place, and time; affect appropriate  NEUROLOGY: no gross sensory or motor deficits   SKIN: No rashes    LABS:                        15.1   11.04 )-----------( 272      ( 20 Mar 2023 07:00 )             43.5     03-20    129<L>  |  89<L>  |  13  ----------------------------<  116<H>  3.9   |  29  |  0.67    Ca    9.0      20 Mar 2023 09:20  Phos  2.3     03-20  Mg     2.10     03-20                    RADIOLOGY & ADDITIONAL TESTS:  Other Results Reviewed Today: BMP with stable Cr, CBC with stable Hg     COORDINATION OF CARE:  Communication: discussed plan of care with ACP 
[facility]  Asael SchwarzDO  Available via MS Teams  In house pager [phone]    SUBJECTIVE / OVERNIGHT EVENTS:  this morning elevated blood pressure.  Patient says she felt a dizziness described as a room spinning sensation.  daughter Tori over the phone mentioned she has been having this for several months, with a mixture of unsteadiness and room spinning.    ADDITIONAL REVIEW OF SYSTEMS:    MEDICATIONS  (STANDING):  cefTRIAXone   IVPB 1000 milliGRAM(s) IV Intermittent every 24 hours  chlorhexidine 2% Cloths 1 Application(s) Topical daily  FLUoxetine 20 milliGRAM(s) Oral daily  hydrALAZINE 25 milliGRAM(s) Oral three times a day  metoprolol succinate  milliGRAM(s) Oral daily  multivitamin 1 Tablet(s) Oral daily  sodium chloride 0.9%. 1000 milliLiter(s) (100 mL/Hr) IV Continuous <Continuous>    MEDICATIONS  (PRN):  acetaminophen     Tablet .. 650 milliGRAM(s) Oral every 6 hours PRN Mild Pain (1 - 3), Moderate Pain (4 - 6)  albuterol    90 MICROgram(s) HFA Inhaler 2 Puff(s) Inhalation every 6 hours PRN Shortness of Breath and/or Wheezing  clonazePAM  Tablet 0.5 milliGRAM(s) Oral three times a day PRN Anxiety      I&O's Summary    19 Mar 2023 07:01  -  19 Mar 2023 13:59  --------------------------------------------------------  IN: 600 mL / OUT: 0 mL / NET: 600 mL        PHYSICAL EXAM:  Vital Signs Last 24 Hrs  T(C): 37.1 (19 Mar 2023 11:04), Max: 37.1 (19 Mar 2023 11:04)  T(F): 98.7 (19 Mar 2023 11:04), Max: 98.7 (19 Mar 2023 11:04)  HR: 84 (19 Mar 2023 11:04) (68 - 84)  BP: 142/67 (19 Mar 2023 11:04) (124/90 - 195/90)  BP(mean): --  RR: 16 (19 Mar 2023 11:04) (16 - 18)  SpO2: 97% (19 Mar 2023 11:04) (95% - 100%)    Parameters below as of 19 Mar 2023 11:04  Patient On (Oxygen Delivery Method): room air      CONSTITUTIONAL: NAD, well-developed, well-groomed  EYES: PERRLA; conjunctiva and sclera clear  ENMT: Moist oral mucosa, no pharyngeal injection or exudates; normal dentition  NECK: Supple, no palpable masses; no thyromegaly  RESPIRATORY: Normal respiratory effort; lungs are clear to auscultation bilaterally  CARDIOVASCULAR: Regular rate and rhythm, normal S1 and S2, no murmur/rub/gallop; No lower extremity edema; Peripheral pulses are 2+ bilaterally  ABDOMEN: Nontender to palpation, normoactive bowel sounds, no rebound/guarding; No hepatosplenomegaly  MUSCULOSKELETAL:  Normal gait; no clubbing or cyanosis of digits; no joint swelling or tenderness to palpation  PSYCH: A+O to person, place, and time; affect appropriate  NEUROLOGY: CN 2-12 are intact and symmetric; no gross sensory deficits   SKIN: No rashes; no palpable lesions    LABS:                        14.4   7.10  )-----------( 212      ( 19 Mar 2023 06:30 )             42.5     03-19    132<L>  |  95<L>  |  16  ----------------------------<  81  3.7   |  25  |  0.86    Ca    9.0      19 Mar 2023 06:30  Phos  2.5     03-19  Mg     2.10     03-19                      COMMUNICATION:  Care Discussed with Consultants/Other Providers and Details of Discussion: d/w medicine PA  Discussions with Patient/Family: d/w daughter Tori over the phone    
MD [facility]  Pager: [phone]  Available via MS Teams    SUBJECTIVE / OVERNIGHT EVENTS:    No complaints     MEDICATIONS  (STANDING):  cefTRIAXone   IVPB 1000 milliGRAM(s) IV Intermittent every 24 hours  chlorhexidine 2% Cloths 1 Application(s) Topical daily  clonazePAM  Tablet 0.5 milliGRAM(s) Oral two times a day  enoxaparin Injectable 40 milliGRAM(s) SubCutaneous every 24 hours  FLUoxetine 20 milliGRAM(s) Oral daily  melatonin 6 milliGRAM(s) Oral once  metoprolol succinate  milliGRAM(s) Oral daily  multivitamin 1 Tablet(s) Oral daily  sodium chloride 1 Gram(s) Oral three times a day  sodium chloride 0.9%. 1000 milliLiter(s) (100 mL/Hr) IV Continuous <Continuous>    MEDICATIONS  (PRN):  acetaminophen     Tablet .. 650 milliGRAM(s) Oral every 6 hours PRN Mild Pain (1 - 3), Moderate Pain (4 - 6)  albuterol    90 MICROgram(s) HFA Inhaler 2 Puff(s) Inhalation every 6 hours PRN Shortness of Breath and/or Wheezing      I&O's Summary    20 Mar 2023 07:01  -  21 Mar 2023 07:00  --------------------------------------------------------  IN: 720 mL / OUT: 700 mL / NET: 20 mL        PHYSICAL EXAM:  Vital Signs Last 24 Hrs  T(C): 37.1 (21 Mar 2023 13:15), Max: 37.1 (20 Mar 2023 22:35)  T(F): 98.7 (21 Mar 2023 13:15), Max: 98.7 (20 Mar 2023 22:35)  HR: 66 (21 Mar 2023 13:15) (64 - 79)  BP: 160/66 (21 Mar 2023 13:15) (143/59 - 167/55)  BP(mean): --  RR: 18 (21 Mar 2023 13:15) (17 - 18)  SpO2: 95% (21 Mar 2023 13:15) (95% - 98%)    Parameters below as of 21 Mar 2023 13:15  Patient On (Oxygen Delivery Method): room air      CONSTITUTIONAL: NAD, well-developed   EYES: conjunctiva and sclera clear  ENMT: Moist oral mucosa   NECK: Supple   RESPIRATORY: Normal respiratory effort; lungs are clear to auscultation bilaterally  CARDIOVASCULAR: Regular rate and rhythm, normal S1 and S2, no murmur/rub/gallop; Peripheral pulses are 2+ bilaterally  ABDOMEN: Nontender to palpation, normoactive bowel sounds, no rebound/guarding   MUSCULOSKELETAL: No lower extremity edema   PSYCH: A+O to person, place, and time; affect appropriate  NEUROLOGY: no gross sensory or motor deficits   SKIN: No rashes    LABS:                        14.8   8.84  )-----------( 265      ( 21 Mar 2023 08:26 )             44.1     03-21    127<L>  |  90<L>  |  14  ----------------------------<  118<H>  3.7   |  29  |  0.64    Ca    9.0      21 Mar 2023 11:17  Phos  2.3     03-21  Mg     1.90     03-21                COVID-19 PCR: NotDetec (21 Mar 2023 07:20)      RADIOLOGY & ADDITIONAL TESTS:  Other Results Reviewed Today: BMP with stable Cr, CBC with stable Hg     COORDINATION OF CARE:  Communication: discussed plan of care with ACP

## 2023-03-26 NOTE — PROGRESS NOTE ADULT - PROBLEM SELECTOR PROBLEM 6
Anxiety
Metabolic encephalopathy
Metabolic encephalopathy
Anxiety

## 2023-03-26 NOTE — PROGRESS NOTE ADULT - PROVIDER SPECIALTY LIST ADULT
Nephrology
Nephrology
Hospitalist

## 2023-03-26 NOTE — PROGRESS NOTE ADULT - PROBLEM SELECTOR PLAN 6
ISTOP in the chart.   Continue with Klonopin and Fluoxetine.
Likely related to acute infection vs hyponatremia.     Follow up in the am.    Will consider psych evaluation if acutely agitated
Likely related to acute infection vs hyponatremia.   Seems to have returned to her baseline per her daughter.    Will consider psych evaluation if acutely agitated
ISTOP in the chart.   Continue with Klonopin and Fluoxetine.

## 2023-03-26 NOTE — PROGRESS NOTE ADULT - ASSESSMENT
Patient is 82-year-old female former smoker with PMHx of memory impairment,  hypertension, anxiety, cataracts, hx of bladder cancer s/p surgical resection and chemo, breast cancer s/p lumpectomy, presenting with unwitnessed fall from home. 
Pt with hyponatremia 
Pt with hyponatremia 
Patient is 82-year-old female former smoker with PMHx of memory impairment,  hypertension, anxiety, cataracts, hx of bladder cancer s/p surgical resection and chemo, breast cancer s/p lumpectomy, presenting with unwitnessed fall from home. 

## 2023-03-27 ENCOUNTER — TRANSCRIPTION ENCOUNTER (OUTPATIENT)
Age: 83
End: 2023-03-27

## 2023-03-27 VITALS
HEART RATE: 60 BPM | RESPIRATION RATE: 16 BRPM | OXYGEN SATURATION: 95 % | SYSTOLIC BLOOD PRESSURE: 153 MMHG | TEMPERATURE: 98 F | DIASTOLIC BLOOD PRESSURE: 62 MMHG

## 2023-03-27 LAB
ANION GAP SERPL CALC-SCNC: 11 MMOL/L — SIGNIFICANT CHANGE UP (ref 7–14)
BUN SERPL-MCNC: 21 MG/DL — SIGNIFICANT CHANGE UP (ref 7–23)
CALCIUM SERPL-MCNC: 9.5 MG/DL — SIGNIFICANT CHANGE UP (ref 8.4–10.5)
CHLORIDE SERPL-SCNC: 98 MMOL/L — SIGNIFICANT CHANGE UP (ref 98–107)
CO2 SERPL-SCNC: 28 MMOL/L — SIGNIFICANT CHANGE UP (ref 22–31)
CREAT SERPL-MCNC: 0.78 MG/DL — SIGNIFICANT CHANGE UP (ref 0.5–1.3)
EGFR: 75 ML/MIN/1.73M2 — SIGNIFICANT CHANGE UP
GLUCOSE SERPL-MCNC: 89 MG/DL — SIGNIFICANT CHANGE UP (ref 70–99)
HCT VFR BLD CALC: 43.2 % — SIGNIFICANT CHANGE UP (ref 34.5–45)
HGB BLD-MCNC: 14.3 G/DL — SIGNIFICANT CHANGE UP (ref 11.5–15.5)
MAGNESIUM SERPL-MCNC: 1.8 MG/DL — SIGNIFICANT CHANGE UP (ref 1.6–2.6)
MCHC RBC-ENTMCNC: 31.2 PG — SIGNIFICANT CHANGE UP (ref 27–34)
MCHC RBC-ENTMCNC: 33.1 GM/DL — SIGNIFICANT CHANGE UP (ref 32–36)
MCV RBC AUTO: 94.1 FL — SIGNIFICANT CHANGE UP (ref 80–100)
NRBC # BLD: 0 /100 WBCS — SIGNIFICANT CHANGE UP (ref 0–0)
NRBC # FLD: 0 K/UL — SIGNIFICANT CHANGE UP (ref 0–0)
PHOSPHATE SERPL-MCNC: 3 MG/DL — SIGNIFICANT CHANGE UP (ref 2.5–4.5)
PLATELET # BLD AUTO: 275 K/UL — SIGNIFICANT CHANGE UP (ref 150–400)
POTASSIUM SERPL-MCNC: 3.8 MMOL/L — SIGNIFICANT CHANGE UP (ref 3.5–5.3)
POTASSIUM SERPL-SCNC: 3.8 MMOL/L — SIGNIFICANT CHANGE UP (ref 3.5–5.3)
RBC # BLD: 4.59 M/UL — SIGNIFICANT CHANGE UP (ref 3.8–5.2)
RBC # FLD: 12.8 % — SIGNIFICANT CHANGE UP (ref 10.3–14.5)
SODIUM SERPL-SCNC: 137 MMOL/L — SIGNIFICANT CHANGE UP (ref 135–145)
WBC # BLD: 8.25 K/UL — SIGNIFICANT CHANGE UP (ref 3.8–10.5)
WBC # FLD AUTO: 8.25 K/UL — SIGNIFICANT CHANGE UP (ref 3.8–10.5)

## 2023-03-27 PROCEDURE — 99239 HOSP IP/OBS DSCHRG MGMT >30: CPT

## 2023-03-27 RX ORDER — CLONAZEPAM 1 MG
1 TABLET ORAL
Qty: 0 | Refills: 0 | DISCHARGE
Start: 2023-03-27

## 2023-03-27 RX ORDER — LATANOPROST 0.05 MG/ML
1 SOLUTION/ DROPS OPHTHALMIC; TOPICAL
Qty: 0 | Refills: 0 | DISCHARGE
Start: 2023-03-27

## 2023-03-27 RX ORDER — HYDROCHLOROTHIAZIDE 25 MG
1 TABLET ORAL
Qty: 0 | Refills: 0 | DISCHARGE

## 2023-03-27 RX ORDER — CLONAZEPAM 1 MG
1 TABLET ORAL
Qty: 0 | Refills: 0 | DISCHARGE

## 2023-03-27 RX ORDER — METOPROLOL TARTRATE 50 MG
1 TABLET ORAL
Qty: 0 | Refills: 0 | DISCHARGE
Start: 2023-03-27

## 2023-03-27 RX ORDER — NIFEDIPINE 30 MG
1 TABLET, EXTENDED RELEASE 24 HR ORAL
Qty: 0 | Refills: 0 | DISCHARGE
Start: 2023-03-27

## 2023-03-27 RX ORDER — SODIUM CHLORIDE 9 MG/ML
2 INJECTION INTRAMUSCULAR; INTRAVENOUS; SUBCUTANEOUS
Qty: 0 | Refills: 0 | DISCHARGE
Start: 2023-03-27

## 2023-03-27 RX ORDER — ALBUTEROL 90 UG/1
2 AEROSOL, METERED ORAL
Qty: 0 | Refills: 0 | DISCHARGE
Start: 2023-03-27

## 2023-03-27 RX ORDER — HYDRALAZINE HCL 50 MG
1 TABLET ORAL
Qty: 0 | Refills: 0 | DISCHARGE

## 2023-03-27 RX ORDER — LOSARTAN POTASSIUM 100 MG/1
1 TABLET, FILM COATED ORAL
Qty: 0 | Refills: 0 | DISCHARGE
Start: 2023-03-27

## 2023-03-27 RX ORDER — FUROSEMIDE 40 MG
1 TABLET ORAL
Qty: 0 | Refills: 0 | DISCHARGE
Start: 2023-03-27

## 2023-03-27 RX ORDER — METOPROLOL TARTRATE 50 MG
1 TABLET ORAL
Qty: 0 | Refills: 0 | DISCHARGE

## 2023-03-27 RX ORDER — ACETAMINOPHEN 500 MG
2 TABLET ORAL
Qty: 0 | Refills: 0 | DISCHARGE
Start: 2023-03-27

## 2023-03-27 RX ORDER — FLUOXETINE HCL 10 MG
1 CAPSULE ORAL
Qty: 0 | Refills: 0 | DISCHARGE
Start: 2023-03-27

## 2023-03-27 RX ORDER — FLUOXETINE HCL 10 MG
1 CAPSULE ORAL
Qty: 0 | Refills: 0 | DISCHARGE

## 2023-03-27 RX ADMIN — Medication 200 MILLIGRAM(S): at 06:30

## 2023-03-27 RX ADMIN — LOSARTAN POTASSIUM 100 MILLIGRAM(S): 100 TABLET, FILM COATED ORAL at 06:30

## 2023-03-27 RX ADMIN — Medication 20 MILLIGRAM(S): at 12:26

## 2023-03-27 RX ADMIN — Medication 20 MILLIGRAM(S): at 06:30

## 2023-03-27 RX ADMIN — Medication 1 TABLET(S): at 12:26

## 2023-03-27 RX ADMIN — Medication 60 MILLIGRAM(S): at 06:31

## 2023-03-27 RX ADMIN — SODIUM CHLORIDE 2 GRAM(S): 9 INJECTION INTRAMUSCULAR; INTRAVENOUS; SUBCUTANEOUS at 06:30

## 2023-03-27 RX ADMIN — CHLORHEXIDINE GLUCONATE 1 APPLICATION(S): 213 SOLUTION TOPICAL at 12:25

## 2023-03-27 RX ADMIN — SODIUM CHLORIDE 2 GRAM(S): 9 INJECTION INTRAMUSCULAR; INTRAVENOUS; SUBCUTANEOUS at 13:22

## 2023-03-27 RX ADMIN — Medication 0.5 MILLIGRAM(S): at 06:30

## 2023-03-27 NOTE — DISCHARGE NOTE NURSING/CASE MANAGEMENT/SOCIAL WORK - PATIENT PORTAL LINK FT
You can access the FollowMyHealth Patient Portal offered by [facility] by registering at the following website: [email or web address]. By joining [facility]’s FollowMyHealth portal, you will also be able to view your health information using other applications (apps) compatible with our system.

## 2023-03-27 NOTE — DIETITIAN INITIAL EVALUATION ADULT - PROBLEM SELECTOR PLAN 1
Unwitnessed fall at home. Imaging without hemorrhage or fracture.     PT referral for assessment. Likely will need BARTOLO.      Fall precuations.     Possible related to hyponatremia vs UTI which is being treated as below.

## 2023-03-27 NOTE — DISCHARGE NOTE PROVIDER - NSDCMRMEDTOKEN_GEN_ALL_CORE_FT
acetaminophen 325 mg oral tablet: 2 tab(s) orally every 6 hours As needed Mild Pain (1 - 3), Moderate Pain (4 - 6)  albuterol 90 mcg/inh inhalation aerosol: 2 puff(s) inhaled every 6 hours As needed Shortness of Breath and/or Wheezing  clonazePAM 0.5 mg oral tablet: 1 tab(s) orally 2 times a day  FLUoxetine 20 mg oral capsule: 1 cap(s) orally once a day  furosemide 20 mg oral tablet: 1 tab(s) orally once a day  latanoprost 0.005% ophthalmic solution: 1 drop(s) to each affected eye once a day (at bedtime)  losartan 100 mg oral tablet: 1 tab(s) orally once a day  metoprolol succinate 200 mg oral tablet, extended release: 1 tab(s) orally once a day  Multiple Vitamins oral tablet: 1 tab(s) orally once a day  NIFEdipine 60 mg oral tablet, extended release: 1 tab(s) orally once a day  sodium chloride 1 g oral tablet: 2 tab(s) orally 3 times a day

## 2023-03-27 NOTE — DIETITIAN INITIAL EVALUATION ADULT - OTHER INFO
Patient w/ variable po intake 25-75% of meals per nursing flowsheet documentation. No nausea/vomiting/diarrhea/constipation or difficulty chewing and swallowing reported. NKFA. Last bowel movement on 3/25. RD spoke to daughter via phone ([phone]-Tori Vidal), reports mother is a "picky eater" even at home. Does not like all food and especially dislikes institutional foods. Food preferences for yogurt (strawberry),  banana, cheddar cheese, eggplant,  ham and bread reported by daughter. Per daughter, patient usual weight 148lbs and unable to recall last weighed. Patient now weighs 162lbs. Continue to provide mealtime assistance and encouragement.

## 2023-03-27 NOTE — DIETITIAN INITIAL EVALUATION ADULT - NS FNS DIET ORDER
Diet, Regular:   1200mL Fluid Restriction ([id number])  Supplement Feeding Modality:  Oral  Ensure Enlive Cans or Servings Per Day:  1       Frequency:  Three Times a day (03-23-23 @ 15:36)

## 2023-03-27 NOTE — DIETITIAN INITIAL EVALUATION ADULT - PERTINENT MEDS FT
MEDICATIONS  (STANDING):  chlorhexidine 2% Cloths 1 Application(s) Topical daily  clonazePAM  Tablet 0.5 milliGRAM(s) Oral two times a day  enoxaparin Injectable 40 milliGRAM(s) SubCutaneous every 24 hours  FLUoxetine 20 milliGRAM(s) Oral daily  furosemide    Tablet 20 milliGRAM(s) Oral daily  latanoprost 0.005% Ophthalmic Solution 1 Drop(s) Both EYES at bedtime  losartan 100 milliGRAM(s) Oral daily  melatonin 6 milliGRAM(s) Oral once  metoprolol succinate  milliGRAM(s) Oral daily  multivitamin 1 Tablet(s) Oral daily  NIFEdipine XL 60 milliGRAM(s) Oral daily  potassium phosphate / sodium phosphate Powder (PHOS-NaK) 1 Packet(s) Oral once  sodium chloride 2 Gram(s) Oral three times a day    MEDICATIONS  (PRN):  acetaminophen     Tablet .. 650 milliGRAM(s) Oral every 6 hours PRN Mild Pain (1 - 3), Moderate Pain (4 - 6)  albuterol    90 MICROgram(s) HFA Inhaler 2 Puff(s) Inhalation every 6 hours PRN Shortness of Breath and/or Wheezing

## 2023-03-27 NOTE — DISCHARGE NOTE PROVIDER - NSDCCPCAREPLAN_GEN_ALL_CORE_FT
PRINCIPAL DISCHARGE DIAGNOSIS  Diagnosis: Fall  Assessment and Plan of Treatment: You were admitted as you had a fall prior to admission (unwitnessed). Imaging did not reveal any fractures or bleeding. Could be secondary to a UTI you were found to have on admission that caused you to be confused at the begining. Maintain fall precautions at all times. You are being discharged to Rehab to improve your strength and endurance prior to returning home.      SECONDARY DISCHARGE DIAGNOSES  Diagnosis: Acute UTI  Assessment and Plan of Treatment: You were found to have a UTI on admission that could have triggered the fall that made you come to the hospital. You received 5 days of Antiibiotics (cefriaxone).    Diagnosis: Hypertension  Assessment and Plan of Treatment: Your blood pressure readings were high initially so changes were made to your regimen. Hydralazine and hydrochlorothiazide were discontinued. You are now on metoprolol, losartan, lasix and nifedipine. Please continue to take these medications and follow up with your PMD 1-2 weeks after discharge from Rehabilitation.    Diagnosis: Hyponatremia  Assessment and Plan of Treatment: Your sodium levels were low on admission so you were started on salt tablets and have also been advised to not drink more than 1 liter of water a day. Lasix was also added to assist with these per Renal recommnedations that evaluated you while you were in the hospital.      Diagnosis: Metabolic encephalopathy  Assessment and Plan of Treatment: You were slighty confused on admission that could be secondary to the infection or the sodium being low or due to benzodiazepine (clonazepam) withdrawal. Your mental status returned to baseline after the infection was treated, your sodium was corrected and the clonazepam was made standing. Please follow up with your PMD 1-2 weeks after discharge from Rehab.     PRINCIPAL DISCHARGE DIAGNOSIS  Diagnosis: Fall  Assessment and Plan of Treatment: You were admitted as you had a fall prior to admission (unwitnessed). Imaging did not reveal any fractures or bleeding. Could be secondary to a UTI you were found to have on admission that caused you to be confused at the begining. Maintain fall precautions at all times. You are being discharged to Rehab to improve your strength and endurance prior to returning home.      SECONDARY DISCHARGE DIAGNOSES  Diagnosis: Acute UTI  Assessment and Plan of Treatment: You were found to have a UTI on admission that could have triggered the fall that made you come to the hospital. You received 5 days of Antiibiotics (cefriaxone).    Diagnosis: Hypertension  Assessment and Plan of Treatment: Your blood pressure readings were high initially so changes were made to your regimen. Hydralazine and hydrochlorothiazide were discontinued. You are now on metoprolol, losartan, lasix and nifedipine. Please continue to take these medications and follow up with your PMD 1-2 weeks after discharge from Rehabilitation.    Diagnosis: Hyponatremia  Assessment and Plan of Treatment: Your sodium levels were low on admission so you were started on salt tablets and have also been advised to not drink more than 1.2 liters of water a day. Lasix was also added to assist with these per Renal recommnedations that evaluated you while you were in the hospital.      Diagnosis: Metabolic encephalopathy  Assessment and Plan of Treatment: You were slighty confused on admission that could be secondary to the infection or the sodium being low or due to benzodiazepine (clonazepam) withdrawal. Your mental status returned to baseline after the infection was treated, your sodium was corrected and the clonazepam was made standing. Please follow up with your PMD 1-2 weeks after discharge from Rehab.

## 2023-03-27 NOTE — DISCHARGE NOTE PROVIDER - HOSPITAL COURSE
82-year-old female former smoker with PMHx of memory impairment,  hypertension, anxiety, cataracts, hx of bladder cancer s/p surgical resection and chemo, breast cancer s/p lumpectomy, who presented with unwitnessed fall from home. Treated for UTI. 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     Problem/Plan - 1:  ·  Problem: Metabolic encephalopathy.   ·  Plan: Likely related to acute infection vs hyponatremia. Could be due to benzo withdrawal or delirium as well   - s/p 5 days of ceftriaxone   - hyponatremia managed as below   - made home clonazepam standing to deal with any withdrawal component    - patient now at baseline mental status     Problem/Plan - 2:  ·  Problem: Hyponatremia.   ·  Plan: - found to have worsened to 126 on 3/23 from 132 on 3/20. Resolved  - urine na: 88, urine osm 505 suggestive of SIADH  - TSH WNL   - AM cortisol: slightly elevated   - despite holding HCTZ pt still dropped Na   - fluid restrict and continue salt tabs  - nephrology consult appreciated, lasix 20 mg daily added-to be continued on discharge     Problem/Plan - 3:  ·  Problem: Hypertension.   ·  Plan: goal BPs can be 140-150 systolic given age   C/w metoprolol daily   Holding hctz for now (not to be resumed on discharge)  DC hydralazine (not to be resumed on discharge)  losartan added and maxed out   c/w current dose of nifedipine     Problem/Plan - 4:  ·  Problem: Fall.   ·  Plan: - Unwitnessed fall at home. Imaging without hemorrhage or fracture.   - orthostatics negative   - could be in s/o AMS due to infection. Now treated and at baseline   - PT recommend rehab  - Fall precautions.     Problem/Plan - 5:  ·  Problem: Acute UTI.   ·  Plan: Appreciated on urinalysis.   Elevated WBC to 11.58, trending downward  S/p 5 days of ceftriaxone     Problem/Plan - 6:  ·  Problem: Anxiety.   ·  Plan: ISTOP in the chart  Continue with Klonopin and Fluoxetine.    BMIof 33 --> Obesity    Medically stable for discharge to [facility].  PMD follow up as outpatient.   82-year-old female former smoker with PMHx of memory impairment,  hypertension, anxiety, cataracts, hx of bladder cancer s/p surgical resection and chemo, breast cancer s/p lumpectomy, who presented with unwitnessed fall from home. Treated for UTI. 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     Problem/Plan - 1:  ·  Problem: Metabolic encephalopathy.   ·  Plan: Likely related to acute infection vs hyponatremia. Could be due to benzo withdrawal or delirium as well   - s/p 5 days of ceftriaxone   - hyponatremia managed as below   - made home clonazepam standing to deal with any withdrawal component    - patient now at baseline mental status     Problem/Plan - 2:  ·  Problem: Hyponatremia.   ·  Plan: - found to have worsened to 126 on 3/23 from 132 on 3/20. Resolved  - urine na: 88, urine osm 505 suggestive of SIADH  - TSH WNL   - AM cortisol: slightly elevated   - despite holding HCTZ pt still dropped Na   - fluid restrict and continue salt tabs  - nephrology consult appreciated, lasix 20 mg daily added-to be continued on discharge     Problem/Plan - 3:  ·  Problem: Hypertension.   ·  Plan: goal BPs can be 140-160 systolic given age   C/w metoprolol daily   Holding hctz for now (not to be resumed on discharge)  DC hydralazine (not to be resumed on discharge)  losartan added and maxed out   c/w current dose of nifedipine     Problem/Plan - 4:  ·  Problem: Fall.   ·  Plan: - Unwitnessed fall at home. Imaging without hemorrhage or fracture.   - orthostatics negative   - could be in s/o AMS due to infection. Now treated and at baseline   - PT recommend rehab  - Fall precautions.     Problem/Plan - 5:  ·  Problem: Acute UTI.   ·  Plan: Appreciated on urinalysis.   Elevated WBC to 11.58, trending downward  S/p 5 days of ceftriaxone     Problem/Plan - 6:  ·  Problem: Anxiety.   ·  Plan: ISTOP in the chart  Continue with Klonopin and Fluoxetine.    BMIof 33 --> Obesity    Medically stable for discharge to [facility].  PMD follow up as outpatient.

## 2023-03-27 NOTE — DIETITIAN INITIAL EVALUATION ADULT - PERTINENT LABORATORY DATA
03-27    137  |  98  |  21  ----------------------------<  89  3.8   |  28  |  0.78    Ca    9.5      27 Mar 2023 06:15  Phos  3.0     03-27  Mg     1.80     03-27

## 2023-03-27 NOTE — DIETITIAN INITIAL EVALUATION ADULT - ORAL NUTRITION SUPPLEMENTS
1. Foodservice department will provide trial of  Magic Cup daily (290kcal, 9gm pro) x1.   2. Suggest PO supplement Ensure Plus (350 kcal, 20 gm protein per 8 oz serving) x 2 per day.   -Patient currently having about 1/d and also on fluid restriction.

## 2023-03-27 NOTE — DISCHARGE NOTE PROVIDER - NSDCCPGOAL_GEN_ALL_CORE_FT
Vit D low. Pt should make sure to take adequate Vit D at 1000 IU/day.  Cholesterol high. To work on diet and exercise to help improve this.   Other labs good  Please re-order same labs for physical next year.    To get better and follow your care plan as instructed.

## 2023-03-27 NOTE — DIETITIAN INITIAL EVALUATION ADULT - REASON FOR ADMISSION
Weakness    82-year-old female former smoker with medical history of memory impairment,  hypertension, anxiety, cataracts, hx of bladder cancer s/p surgical resection and chemo, breast cancer s/p lumpectomy, presenting with unwitnessed fall from home.

## 2023-03-27 NOTE — DISCHARGE NOTE PROVIDER - YES NO FOR MLM POSITIVE OR NEGATIVE COVID RESULT
[Chest Pain] : chest pain [Dyspnea on Exertion] : dyspnea on exertion [Dizziness] : dizziness [Negative] : Psychiatric [Palpitations] : no palpitations [Leg Claudication] : no leg claudication [Orthopnea] : no orthopnea [Paroxysmal Nocturnal Dyspnea] : no paroxysmal nocturnal dyspnea [Shortness Of Breath] : no shortness of breath [Wheezing] : no wheezing [Cough] : no cough [Headache] : no headache [Fainting] : no fainting [Memory Loss] : no memory loss [Unsteady Walking] : no ataxia ,

## 2023-08-15 PROBLEM — C50.919 MALIGNANT NEOPLASM OF UNSPECIFIED SITE OF UNSPECIFIED FEMALE BREAST: Chronic | Status: ACTIVE | Noted: 2023-03-17

## 2023-08-15 PROBLEM — C67.9 MALIGNANT NEOPLASM OF BLADDER, UNSPECIFIED: Chronic | Status: ACTIVE | Noted: 2023-03-17

## 2023-08-30 ENCOUNTER — APPOINTMENT (OUTPATIENT)
Dept: CARDIOLOGY | Facility: HOSPITAL | Age: 83
End: 2023-08-30

## 2023-10-12 ENCOUNTER — EMERGENCY (EMERGENCY)
Facility: HOSPITAL | Age: 83
LOS: 1 days | Discharge: HOME | End: 2023-10-12
Attending: EMERGENCY MEDICINE | Admitting: EMERGENCY MEDICINE
Payer: MEDICARE

## 2023-10-12 VITALS
HEART RATE: 58 BPM | DIASTOLIC BLOOD PRESSURE: 60 MMHG | SYSTOLIC BLOOD PRESSURE: 111 MMHG | RESPIRATION RATE: 16 BRPM | OXYGEN SATURATION: 99 % | TEMPERATURE: 98 F

## 2023-10-12 VITALS
TEMPERATURE: 98 F | RESPIRATION RATE: 18 BRPM | DIASTOLIC BLOOD PRESSURE: 85 MMHG | SYSTOLIC BLOOD PRESSURE: 143 MMHG | OXYGEN SATURATION: 96 % | HEART RATE: 83 BPM

## 2023-10-12 DIAGNOSIS — Z98.890 OTHER SPECIFIED POSTPROCEDURAL STATES: Chronic | ICD-10-CM

## 2023-10-12 DIAGNOSIS — Z90.3 ACQUIRED ABSENCE OF STOMACH [PART OF]: Chronic | ICD-10-CM

## 2023-10-12 LAB
ALBUMIN SERPL ELPH-MCNC: 4.2 G/DL — SIGNIFICANT CHANGE UP (ref 3.3–5)
ALP SERPL-CCNC: 56 U/L — SIGNIFICANT CHANGE UP (ref 40–120)
ALT FLD-CCNC: 29 U/L — SIGNIFICANT CHANGE UP (ref 4–33)
ANION GAP SERPL CALC-SCNC: 13 MMOL/L — SIGNIFICANT CHANGE UP (ref 7–14)
APPEARANCE UR: ABNORMAL
APPEARANCE UR: ABNORMAL
AST SERPL-CCNC: 31 U/L — SIGNIFICANT CHANGE UP (ref 4–32)
BACTERIA # UR AUTO: ABNORMAL /HPF
BASOPHILS # BLD AUTO: 0.03 K/UL — SIGNIFICANT CHANGE UP (ref 0–0.2)
BASOPHILS # BLD AUTO: 0.03 K/UL — SIGNIFICANT CHANGE UP (ref 0–0.2)
BASOPHILS NFR BLD AUTO: 0.3 % — SIGNIFICANT CHANGE UP (ref 0–2)
BASOPHILS NFR BLD AUTO: 0.3 % — SIGNIFICANT CHANGE UP (ref 0–2)
BILIRUB SERPL-MCNC: 0.7 MG/DL — SIGNIFICANT CHANGE UP (ref 0.2–1.2)
BILIRUB UR-MCNC: NEGATIVE — SIGNIFICANT CHANGE UP
BILIRUB UR-MCNC: NEGATIVE — SIGNIFICANT CHANGE UP
BUN SERPL-MCNC: 12 MG/DL — SIGNIFICANT CHANGE UP (ref 7–23)
CALCIUM SERPL-MCNC: 9.5 MG/DL — SIGNIFICANT CHANGE UP (ref 8.4–10.5)
CAST: 0 /LPF — SIGNIFICANT CHANGE UP (ref 0–4)
CAST: 0 /LPF — SIGNIFICANT CHANGE UP (ref 0–4)
CAST: 6 /LPF — HIGH (ref 0–4)
CHLORIDE SERPL-SCNC: 96 MMOL/L — LOW (ref 98–107)
CO2 SERPL-SCNC: 23 MMOL/L — SIGNIFICANT CHANGE UP (ref 22–31)
COLOR SPEC: YELLOW — SIGNIFICANT CHANGE UP
COLOR SPEC: YELLOW — SIGNIFICANT CHANGE UP
CREAT SERPL-MCNC: 0.89 MG/DL — SIGNIFICANT CHANGE UP (ref 0.5–1.3)
DIFF PNL FLD: ABNORMAL
DIFF PNL FLD: NEGATIVE — SIGNIFICANT CHANGE UP
EGFR: 64 ML/MIN/1.73M2 — SIGNIFICANT CHANGE UP
EOSINOPHIL # BLD AUTO: 0.05 K/UL — SIGNIFICANT CHANGE UP (ref 0–0.5)
EOSINOPHIL # BLD AUTO: 0.05 K/UL — SIGNIFICANT CHANGE UP (ref 0–0.5)
EOSINOPHIL NFR BLD AUTO: 0.6 % — SIGNIFICANT CHANGE UP (ref 0–6)
EOSINOPHIL NFR BLD AUTO: 0.6 % — SIGNIFICANT CHANGE UP (ref 0–6)
GLUCOSE SERPL-MCNC: 106 MG/DL — HIGH (ref 70–99)
GLUCOSE UR QL: NEGATIVE MG/DL — SIGNIFICANT CHANGE UP
GLUCOSE UR QL: NEGATIVE MG/DL — SIGNIFICANT CHANGE UP
HCT VFR BLD CALC: 43 % — SIGNIFICANT CHANGE UP (ref 34.5–45)
HCT VFR BLD CALC: 43 % — SIGNIFICANT CHANGE UP (ref 34.5–45)
HGB BLD-MCNC: 15.2 G/DL — SIGNIFICANT CHANGE UP (ref 11.5–15.5)
HGB BLD-MCNC: 15.2 G/DL — SIGNIFICANT CHANGE UP (ref 11.5–15.5)
IANC: 5.73 K/UL — SIGNIFICANT CHANGE UP (ref 1.8–7.4)
IANC: 5.73 K/UL — SIGNIFICANT CHANGE UP (ref 1.8–7.4)
IMM GRANULOCYTES NFR BLD AUTO: 0.1 % — SIGNIFICANT CHANGE UP (ref 0–0.9)
IMM GRANULOCYTES NFR BLD AUTO: 0.1 % — SIGNIFICANT CHANGE UP (ref 0–0.9)
KETONES UR-MCNC: NEGATIVE MG/DL — SIGNIFICANT CHANGE UP
KETONES UR-MCNC: NEGATIVE MG/DL — SIGNIFICANT CHANGE UP
LEUKOCYTE ESTERASE UR-ACNC: ABNORMAL
LEUKOCYTE ESTERASE UR-ACNC: ABNORMAL
LYMPHOCYTES # BLD AUTO: 2.32 K/UL — SIGNIFICANT CHANGE UP (ref 1–3.3)
LYMPHOCYTES # BLD AUTO: 2.32 K/UL — SIGNIFICANT CHANGE UP (ref 1–3.3)
LYMPHOCYTES # BLD AUTO: 26.5 % — SIGNIFICANT CHANGE UP (ref 13–44)
LYMPHOCYTES # BLD AUTO: 26.5 % — SIGNIFICANT CHANGE UP (ref 13–44)
MAGNESIUM SERPL-MCNC: 2.3 MG/DL — SIGNIFICANT CHANGE UP (ref 1.6–2.6)
MCHC RBC-ENTMCNC: 32.7 PG — SIGNIFICANT CHANGE UP (ref 27–34)
MCHC RBC-ENTMCNC: 32.7 PG — SIGNIFICANT CHANGE UP (ref 27–34)
MCHC RBC-ENTMCNC: 35.3 GM/DL — SIGNIFICANT CHANGE UP (ref 32–36)
MCHC RBC-ENTMCNC: 35.3 GM/DL — SIGNIFICANT CHANGE UP (ref 32–36)
MCV RBC AUTO: 92.5 FL — SIGNIFICANT CHANGE UP (ref 80–100)
MCV RBC AUTO: 92.5 FL — SIGNIFICANT CHANGE UP (ref 80–100)
MONOCYTES # BLD AUTO: 0.62 K/UL — SIGNIFICANT CHANGE UP (ref 0–0.9)
MONOCYTES # BLD AUTO: 0.62 K/UL — SIGNIFICANT CHANGE UP (ref 0–0.9)
MONOCYTES NFR BLD AUTO: 7.1 % — SIGNIFICANT CHANGE UP (ref 2–14)
MONOCYTES NFR BLD AUTO: 7.1 % — SIGNIFICANT CHANGE UP (ref 2–14)
NEUTROPHILS # BLD AUTO: 5.73 K/UL — SIGNIFICANT CHANGE UP (ref 1.8–7.4)
NEUTROPHILS # BLD AUTO: 5.73 K/UL — SIGNIFICANT CHANGE UP (ref 1.8–7.4)
NEUTROPHILS NFR BLD AUTO: 65.4 % — SIGNIFICANT CHANGE UP (ref 43–77)
NEUTROPHILS NFR BLD AUTO: 65.4 % — SIGNIFICANT CHANGE UP (ref 43–77)
NITRITE UR-MCNC: NEGATIVE — SIGNIFICANT CHANGE UP
NITRITE UR-MCNC: NEGATIVE — SIGNIFICANT CHANGE UP
NRBC # BLD: 0 /100 WBCS — SIGNIFICANT CHANGE UP (ref 0–0)
NRBC # BLD: 0 /100 WBCS — SIGNIFICANT CHANGE UP (ref 0–0)
NRBC # FLD: 0 K/UL — SIGNIFICANT CHANGE UP (ref 0–0)
NRBC # FLD: 0 K/UL — SIGNIFICANT CHANGE UP (ref 0–0)
PH UR: 8 — SIGNIFICANT CHANGE UP (ref 5–8)
PH UR: 8 — SIGNIFICANT CHANGE UP (ref 5–8)
PHOSPHATE SERPL-MCNC: 3.5 MG/DL — SIGNIFICANT CHANGE UP (ref 2.5–4.5)
PLATELET # BLD AUTO: 257 K/UL — SIGNIFICANT CHANGE UP (ref 150–400)
PLATELET # BLD AUTO: 257 K/UL — SIGNIFICANT CHANGE UP (ref 150–400)
POTASSIUM SERPL-MCNC: 4.4 MMOL/L — SIGNIFICANT CHANGE UP (ref 3.5–5.3)
POTASSIUM SERPL-SCNC: 4.4 MMOL/L — SIGNIFICANT CHANGE UP (ref 3.5–5.3)
PROT SERPL-MCNC: 7.4 G/DL — SIGNIFICANT CHANGE UP (ref 6–8.3)
PROT UR-MCNC: NEGATIVE MG/DL — SIGNIFICANT CHANGE UP
PROT UR-MCNC: SIGNIFICANT CHANGE UP MG/DL
RBC # BLD: 4.65 M/UL — SIGNIFICANT CHANGE UP (ref 3.8–5.2)
RBC # BLD: 4.65 M/UL — SIGNIFICANT CHANGE UP (ref 3.8–5.2)
RBC # FLD: 12.6 % — SIGNIFICANT CHANGE UP (ref 10.3–14.5)
RBC # FLD: 12.6 % — SIGNIFICANT CHANGE UP (ref 10.3–14.5)
RBC CASTS # UR COMP ASSIST: 1 /HPF — SIGNIFICANT CHANGE UP (ref 0–4)
RBC CASTS # UR COMP ASSIST: 2 /HPF — SIGNIFICANT CHANGE UP (ref 0–4)
RBC CASTS # UR COMP ASSIST: 2 /HPF — SIGNIFICANT CHANGE UP (ref 0–4)
REVIEW: SIGNIFICANT CHANGE UP
SODIUM SERPL-SCNC: 132 MMOL/L — LOW (ref 135–145)
SP GR SPEC: 1.01 — SIGNIFICANT CHANGE UP (ref 1–1.03)
SP GR SPEC: 1.01 — SIGNIFICANT CHANGE UP (ref 1–1.03)
SQUAMOUS # UR AUTO: 20 /HPF — HIGH (ref 0–5)
SQUAMOUS # UR AUTO: 42 /HPF — HIGH (ref 0–5)
SQUAMOUS # UR AUTO: 42 /HPF — HIGH (ref 0–5)
TSH SERPL-MCNC: 1.16 UIU/ML — SIGNIFICANT CHANGE UP (ref 0.27–4.2)
UROBILINOGEN FLD QL: 0.2 MG/DL — SIGNIFICANT CHANGE UP (ref 0.2–1)
UROBILINOGEN FLD QL: 0.2 MG/DL — SIGNIFICANT CHANGE UP (ref 0.2–1)
WBC # BLD: 8.76 K/UL — SIGNIFICANT CHANGE UP (ref 3.8–10.5)
WBC # BLD: 8.76 K/UL — SIGNIFICANT CHANGE UP (ref 3.8–10.5)
WBC # FLD AUTO: 8.76 K/UL — SIGNIFICANT CHANGE UP (ref 3.8–10.5)
WBC # FLD AUTO: 8.76 K/UL — SIGNIFICANT CHANGE UP (ref 3.8–10.5)
WBC UR QL: 35 /HPF — HIGH (ref 0–5)
WBC UR QL: 35 /HPF — HIGH (ref 0–5)
WBC UR QL: 7 /HPF — HIGH (ref 0–5)

## 2023-10-12 PROCEDURE — 70450 CT HEAD/BRAIN W/O DYE: CPT | Mod: 26,MA

## 2023-10-12 PROCEDURE — 99284 EMERGENCY DEPT VISIT MOD MDM: CPT

## 2023-10-12 PROCEDURE — 93010 ELECTROCARDIOGRAM REPORT: CPT

## 2023-10-12 RX ORDER — MECLIZINE HCL 12.5 MG
12.5 TABLET ORAL ONCE
Refills: 0 | Status: COMPLETED | OUTPATIENT
Start: 2023-10-12 | End: 2023-10-12

## 2023-10-12 RX ADMIN — Medication 12.5 MILLIGRAM(S): at 13:18

## 2023-10-12 NOTE — ED ADULT NURSE REASSESSMENT NOTE - HEART RATE (BEATS/MIN)
Procedure: Colonoscopy with no inteverntions  Sedation: Concsious  O2: 2L  Tolerated Procedure: Well  Patient returned to recovery room in stable condition with RN  Other: 10 year f/u  Shannan Ramírez RN  
77

## 2023-10-12 NOTE — ED ADULT TRIAGE NOTE - CHIEF COMPLAINT QUOTE
Pt coming from home c/o dizziness, feels like room is spinning. Denies blurry vision, numbness/tingling, weakness.

## 2023-10-12 NOTE — ED PROVIDER NOTE - ATTENDING CONTRIBUTION TO CARE
Dr. Miller: I cared for this patient with a medical student.  The medical student documented in this chart as per guidelines.  I have personally seen and examined this patient and I have fully participated in their care.  I have reviewed all pertinent clinical information, including the history, physical exam, plan and medical student's documentation, and agree except as noted.  I have edited the medical student's note as I felt medically appropriate.  See above chart documentation for details, including any edits that I have made.  Unless noted otherwise, I am in agreement with the student's documentation.    Dr. Miller: This is an 81 yo female with ?dementia, recurrent falls, HTN, cataracts, s/p treatment for bladder cancer and breast cancer, in ED with approx 1 year of dizziness that is described as positional room-spinning sensation.  Pt also notes few days of tingling sensation around lower lip, but no focal weakness, CP/SOB, N/V/D or urinary complaints.  On exam pt chronically-ill appearing but in NAD, heart RRR, lungs CTAB, abd NTND, extremities without swelling, strength grossly intact in all extremities and skin without rash.

## 2023-10-12 NOTE — ED PROVIDER NOTE - OBJECTIVE STATEMENT
Patient is an 83 yo F pmhx of memory impairment, recurrent fall hx, HTN, anxiety, cataracts, bladder cancer s/p resection and chemo and breast cancer s/p lumpectomy presents to the ED for dizziness for the last few months. Patient stated she has been feeling dizzy with a feeling of the room spinning for the last few months that worsens with positional movements. Has not tried any medications to help with dizziness. As per aide at bedside, pt has hx of 3 falls within last 6 months. Has not followed up with PCP for dizziness. Endorses lower lip numbness for past few days. Denied numbness, tingling, new onset blurry vision, headache, muscle weakness, changes in hearing, fever, chills, nausea, vomiting, diarrhea, chest pain, SOB, cough, wheezing, leg pain, leg swelling. Patient is an 81 yo F pmhx of memory impairment, recurrent fall hx, HTN, anxiety, cataracts, bladder cancer s/p resection and chemo and breast cancer s/p lumpectomy presents to the ED for dizziness for the last one year. Patient stated she has been feeling dizzy with a feeling of the room spinning for the last few months that worsens with positional movements. Has not tried any medications to help with dizziness. As per aide at bedside, pt has hx of 3 falls within last 6 months. Has not followed up with PCP for dizziness. Endorses lower lip numbness for past few days. Denied numbness, tingling, new onset blurry vision, headache, muscle weakness, changes in hearing, fever, chills, nausea, vomiting, diarrhea, chest pain, SOB, cough, wheezing, leg pain, leg swelling. No dysuria, increased frequency, abd pain.

## 2023-10-12 NOTE — ED PROVIDER NOTE - NSFOLLOWUPCLINICS_GEN_ALL_ED_FT
[facility]  Neurology Epilepsy  [address] [place] [address], [address] [place] [id number]  Phone: [phone]  Fax: [phone]

## 2023-10-12 NOTE — ED PROVIDER NOTE - PROGRESS NOTE DETAILS
Laurel Lopez, PGY-2: pt reports improvement of symptoms. Better than baseline.     DaughterTori at bedside reports feeling patient is safe to go home. Has health aide 20h daily at home, daughter and sister alternate nightly to stay with patient.    CT negative. Instructed patient and daughter to take pt to neurologist for further work up.

## 2023-10-12 NOTE — ED PROVIDER NOTE - INTERNATIONAL TRAVEL
Patient: Shalini Jason    
No

## 2023-10-12 NOTE — ED PROVIDER NOTE - NS ED MD DISPO DISCHARGE CCDA
EMERGENCY DEPARTMENT HISTORY AND PHYSICAL EXAM      Date: 5/26/2021  Patient Name: Liliana Aragon    History of Presenting Illness     Chief Complaint   Patient presents with    Back Pain     lower, x2 months       History Provided By: Patient, partner at bedside    HPI: Liliana Aragon, 20 y.o. male without significant PMHx presents BIB self to the ED with cc of atraumatic lumbar back pain x1 year. Patient describes the pain as \"like cramps\" in my lower back, worse after long periods of standing. 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 He was put on temporary light duty but his employer reportedly states that he needs further documentation if he wants to stay on light duty. Patient denies radiculopathy, lower extremity numbness/tingling/weakness, difficulty ambulating, loss of bladder bowel function, saddle anesthesia, fever/chills, abdominal pain, urinary symptoms. He has not tried any medications for his pain. He is ambulatory into the ED without apparent difficulty. There are no other complaints, changes, or physical findings at this time. PCP: Vania Gutiérrez MD    No current facility-administered medications on file prior to encounter. Current Outpatient Medications on File Prior to Encounter   Medication Sig Dispense Refill    butalbital-acetaminophen-caffeine (Esgic) -40 mg per tablet Take 1 Tab by mouth every six (6) hours as needed for Headache. 15 Tab 0       Past History     Past Medical History:  Past Medical History:   Diagnosis Date    Academic problem 4/28/2014    Legal circumstances 4/28/2014    Migraine     Prediabetes 4/28/2014 4/24/2014: Counseled [facility] Lab Results Component Value Date/Time  Hemoglobin A1c 6.0 9/25/2013  5:44 PM  Hemoglobin A1c (POC) 5.6% 3/12/2014 10:09 AM 3/12/2014: Counseled on diet, exercise. Past Surgical History:  History reviewed. No pertinent surgical history.     Family History:  Family History   Problem Relation Age of Onset    Diabetes Maternal Aunt     Diabetes Maternal Grandmother     Hypertension Maternal Grandmother        Social History:  Social History     Tobacco Use    Smoking status: Current Some Day Smoker     Packs/day: 1.00     Years: 3.00     Pack years: 3.00     Types: Cigarettes     Start date: 1/1/2011    Smokeless tobacco: Current User   Substance Use Topics    Alcohol use: Yes    Drug use: Yes     Types: Marijuana       Allergies:  No Known Allergies      Review of Systems   Review of Systems   Constitutional: Negative for chills and fever. HENT: Negative for congestion. Eyes: Negative for redness. Respiratory: Negative for shortness of breath. Cardiovascular: Negative for chest pain. Gastrointestinal: Negative for abdominal pain. Endocrine: Negative for polydipsia. Genitourinary: Negative for flank pain. Musculoskeletal: Positive for back pain. Negative for gait problem. Skin: Negative for rash. Allergic/Immunologic: Negative for immunocompromised state. Neurological: Negative for weakness. Hematological: Does not bruise/bleed easily. Psychiatric/Behavioral: Negative for agitation. Physical Exam   Physical Exam  Vitals and nursing note reviewed. Constitutional:       General: He is not in acute distress. Appearance: Normal appearance. He is obese. He is not toxic-appearing. Comments: Sitting comfortably, does not appear to be in any distress   HENT:      Head: Normocephalic and atraumatic. Nose: Nose normal.      Mouth/Throat:      Mouth: Mucous membranes are moist.   Eyes:      Extraocular Movements: Extraocular movements intact. Conjunctiva/sclera: Conjunctivae normal.      Pupils: Pupils are equal, round, and reactive to light. Neck:      Trachea: Phonation normal.   Cardiovascular:      Rate and Rhythm: Normal rate and regular rhythm.    Pulmonary:      Effort: Pulmonary effort is normal. Breath sounds: Normal breath sounds. Abdominal:      Palpations: Abdomen is soft. Tenderness: There is no abdominal tenderness. There is no guarding. Musculoskeletal:         General: Normal range of motion. Cervical back: Normal range of motion and neck supple. Comments: 5/5 strength and sensation b/l UE/LEs. Gait is steady and symmetrical.  No midline or paraspinal tenderness. Skin:     General: Skin is warm and dry. Neurological:      General: No focal deficit present. Mental Status: He is alert and oriented to person, place, and time. GCS: GCS eye subscore is 4. GCS verbal subscore is 5. GCS motor subscore is 6. Motor: No weakness. Coordination: Coordination normal.      Gait: Gait normal.   Psychiatric:         Mood and Affect: Mood normal.         Behavior: Behavior normal.         Diagnostic Study Results     Labs -   No results found for this or any previous visit (from the past 12 hour(s)). Radiologic Studies -   XR SPINE LUMB 2 OR 3 V   Final Result   1. Normal lumbar spine. CT Results  (Last 48 hours)    None        CXR Results  (Last 48 hours)    None            Medical Decision Making   I am the first provider for this patient. I reviewed the vital signs, available nursing notes, past medical history, past surgical history, family history and social history. Vital Signs-Reviewed the patient's vital signs. Patient Vitals for the past 12 hrs:   Temp Pulse Resp BP SpO2   05/26/21 1710 98.2 °F (36.8 °C) 74 16 119/69 99 %       Records Reviewed: Nursing Notes    Provider Notes (Medical Decision Making):   No back pain red flag signs or symptoms. X-ray obtained due to chronicity of pain. Recommended patient try NSAIDs. Discussed symptomatic treatment at home. Follow-up with PCP. ED return precautions given. ED Course:   Initial assessment performed.  The patients presenting problems have been discussed, and they are in agreement with the care plan formulated and outlined with them. I have encouraged them to ask questions as they arise throughout their visit. Critical Care Time: None    Disposition:  D/c    PLAN:  1. Discharge Medication List as of 5/26/2021  7:22 PM      START taking these medications    Details   cyclobenzaprine (FLEXERIL) 10 mg tablet Take 1 Tablet by mouth three (3) times daily as needed for Muscle Spasm(s) for up to 5 days. , Normal, Disp-15 Tablet, R-0      naproxen (NAPROSYN) 250 mg tablet Take 1 Tablet by mouth two (2) times daily (with meals) for 7 days. , Normal, Disp-14 Tablet, R-0         CONTINUE these medications which have NOT CHANGED    Details   butalbital-acetaminophen-caffeine (Esgic) -40 mg per tablet Take 1 Tab by mouth every six (6) hours as needed for Headache., Normal, Disp-15 Tab, R-0           2. Follow-up Information     Follow up With Specialties Details Why [address] [facility] EMERGENCY DEPT Emergency Medicine  As needed, If symptoms worsen [address] [facility]    Renny Conner MD Family Medicine Call  For follow up [address] [address]  [phone]          Return to ED if worse     Diagnosis     Clinical Impression:   1. Chronic bilateral low back pain without sciatica          Please note that this dictation was completed with Xfire, the computer voice recognition software. Quite often unanticipated grammatical, syntax, homophones, and other interpretive errors are inadvertently transcribed by the computer software. Please disregards these errors. Please excuse any errors that have escaped final proofreading. Patient/Caregiver provided printed discharge information.

## 2023-10-12 NOTE — ED PROVIDER NOTE - PATIENT PORTAL LINK FT
You can access the FollowMyHealth Patient Portal offered by [facility] by registering at the following website: [email or web address]. By joining [email or web address] FollowMyHealth portal, you will also be able to view your health information using other applications (apps) compatible with our system.

## 2023-10-12 NOTE — ED PROVIDER NOTE - NEUROLOGICAL, MLM
Alert and oriented, no focal deficits, no motor or sensory deficits. AOx2 (not to year), no focal deficits, no motor or sensory deficits. No nystagmus, strength 5/5 UE and LE. No facial droop. Sensation intact throughout. No pronator drift. normal finger to nose test

## 2023-10-12 NOTE — ED ADULT NURSE NOTE - OBJECTIVE STATEMENT
Patient A&o X4, history of HTN and anxiety, received in spot 26a, with complaints of dizziness/mouth numbness.  [id number] activated for language interpretation services. Patient states, "I have been feeling dizzy for the past few days". Patient reports feeling like she is spinning and sometimes she only sees all black. Patient also complains of tingling and numbness around mouth. No further neurological deficits noted. Patient able to speak in clear sentences, respirations equal and unlabored. Lung sounds clear b/l, equal chest rise and fall noted. Denies CP/SOB, fever, chills, nausea, vomiting and diarrhea at this time. Skin warm and dry. Provider at bedside for eval, pending further orders.

## 2023-10-12 NOTE — ED PROVIDER NOTE - NSFOLLOWUPINSTRUCTIONS_ED_ALL_ED_FT
Lo/a vieron en el departamento de emergencias por mareos. Andreia toda la información adjunta, andreia todas las instrucciones adicionales y neema shanon yasir con todos los proveedores según las indicaciones.    1) Neema yasir con [facility] doctor primario en 1-5 días. Neema yasir con un neurologo esta semana.    2) Continúe tomando todos los medicamentos según lo prescrito.    3) Descanse y mantengase hidratado/a.     4) Regrese a la anel de emergencias por cualquier síntoma nuevo o que empeore. Venga de vuelta imediatamente si tiene caida, dificultad con hablar, dolor de pecho, debilidad en alguna parte del cuerpo, o cualquier sintoma que empeora o le preocupe.      Andreia toda la información del paciente adjunta.

## 2023-10-12 NOTE — ED ADULT NURSE NOTE - NSFALLRISKINTERV_ED_ALL_ED
Assistance OOB with selected safe patient handling equipment if applicable/Assistance with ambulation/Communicate fall risk and risk factors to all staff, patient, and family/Encourage patient to sit up slowly, dangle for a short time, stand at bedside before walking/Monitor gait and stability/Orthostatic vital signs/Provide patient with walking aids/Provide visual cue: yellow wristband, yellow gown, etc/Reinforce activity limits and safety measures with patient and family/Call bell, personal items and telephone in reach/Instruct patient to call for assistance before getting out of bed/chair/stretcher/Non-slip footwear applied when patient is off stretcher/[place] to call system/Physically safe environment - no spills, clutter or unnecessary equipment/Purposeful Proactive Rounding/Room/bathroom lighting operational, light cord in reach

## 2023-10-12 NOTE — ED PROVIDER NOTE - CLINICAL SUMMARY MEDICAL DECISION MAKING FREE TEXT BOX
Patient is an 83 yo F pmhx of memory impairment, recurrent fall hx, HTN, anxiety, cataracts, bladder cancer s/p resection and chemo and breast cancer s/p lumpectomy presents to the ED for dizziness for the last few months. Vitals stable. Patient stable. Physical Exam showed no new significant findings. Meclizine given. Orthostatics pending.   Labs: CBC, CMP, Ucx, Mg, Phos, TSH, UA pending  Imaging: CT angio head and neck and CT head non-con pending  Differential dx includes the following but is not limited to vertigo, orthostatic hypotension, vertebrobasilar insufficiency. R/o intra cranial hemorrhage, stroke

## 2023-10-13 LAB
CULTURE RESULTS: SIGNIFICANT CHANGE UP
SPECIMEN SOURCE: SIGNIFICANT CHANGE UP

## 2023-12-20 ENCOUNTER — INPATIENT (INPATIENT)
Facility: HOSPITAL | Age: 83
LOS: 5 days | Discharge: HOME | DRG: 884 | End: 2023-12-26
Attending: INTERNAL MEDICINE | Admitting: INTERNAL MEDICINE
Payer: MEDICARE

## 2023-12-20 VITALS
HEART RATE: 83 BPM | TEMPERATURE: 97 F | DIASTOLIC BLOOD PRESSURE: 108 MMHG | SYSTOLIC BLOOD PRESSURE: 173 MMHG | RESPIRATION RATE: 18 BRPM | OXYGEN SATURATION: 100 % | HEIGHT: 62 IN | WEIGHT: 130.07 LBS

## 2023-12-20 DIAGNOSIS — Z98.890 OTHER SPECIFIED POSTPROCEDURAL STATES: Chronic | ICD-10-CM

## 2023-12-20 DIAGNOSIS — R41.82 ALTERED MENTAL STATUS, UNSPECIFIED: ICD-10-CM

## 2023-12-20 DIAGNOSIS — Z90.3 ACQUIRED ABSENCE OF STOMACH [PART OF]: Chronic | ICD-10-CM

## 2023-12-20 LAB
ALBUMIN SERPL ELPH-MCNC: 4.5 G/DL — SIGNIFICANT CHANGE UP (ref 3.3–5)
ALBUMIN SERPL ELPH-MCNC: 4.5 G/DL — SIGNIFICANT CHANGE UP (ref 3.3–5)
ALP SERPL-CCNC: 54 U/L — SIGNIFICANT CHANGE UP (ref 40–120)
ALP SERPL-CCNC: 54 U/L — SIGNIFICANT CHANGE UP (ref 40–120)
ALT FLD-CCNC: 32 U/L — SIGNIFICANT CHANGE UP (ref 10–45)
ALT FLD-CCNC: 32 U/L — SIGNIFICANT CHANGE UP (ref 10–45)
ANION GAP SERPL CALC-SCNC: 14 MMOL/L — SIGNIFICANT CHANGE UP (ref 5–17)
ANION GAP SERPL CALC-SCNC: 14 MMOL/L — SIGNIFICANT CHANGE UP (ref 5–17)
APPEARANCE UR: CLEAR — SIGNIFICANT CHANGE UP
APPEARANCE UR: CLEAR — SIGNIFICANT CHANGE UP
AST SERPL-CCNC: 29 U/L — SIGNIFICANT CHANGE UP (ref 10–40)
AST SERPL-CCNC: 29 U/L — SIGNIFICANT CHANGE UP (ref 10–40)
BACTERIA # UR AUTO: NEGATIVE /HPF — SIGNIFICANT CHANGE UP
BACTERIA # UR AUTO: NEGATIVE /HPF — SIGNIFICANT CHANGE UP
BASOPHILS # BLD AUTO: 0.03 K/UL — SIGNIFICANT CHANGE UP (ref 0–0.2)
BASOPHILS # BLD AUTO: 0.03 K/UL — SIGNIFICANT CHANGE UP (ref 0–0.2)
BASOPHILS NFR BLD AUTO: 0.3 % — SIGNIFICANT CHANGE UP (ref 0–2)
BASOPHILS NFR BLD AUTO: 0.3 % — SIGNIFICANT CHANGE UP (ref 0–2)
BILIRUB SERPL-MCNC: 0.7 MG/DL — SIGNIFICANT CHANGE UP (ref 0.2–1.2)
BILIRUB SERPL-MCNC: 0.7 MG/DL — SIGNIFICANT CHANGE UP (ref 0.2–1.2)
BILIRUB UR-MCNC: NEGATIVE — SIGNIFICANT CHANGE UP
BILIRUB UR-MCNC: NEGATIVE — SIGNIFICANT CHANGE UP
BUN SERPL-MCNC: 12 MG/DL — SIGNIFICANT CHANGE UP (ref 7–23)
BUN SERPL-MCNC: 12 MG/DL — SIGNIFICANT CHANGE UP (ref 7–23)
CALCIUM SERPL-MCNC: 9.8 MG/DL — SIGNIFICANT CHANGE UP (ref 8.4–10.5)
CALCIUM SERPL-MCNC: 9.8 MG/DL — SIGNIFICANT CHANGE UP (ref 8.4–10.5)
CAST: 0 /LPF — SIGNIFICANT CHANGE UP (ref 0–4)
CAST: 0 /LPF — SIGNIFICANT CHANGE UP (ref 0–4)
CHLORIDE SERPL-SCNC: 101 MMOL/L — SIGNIFICANT CHANGE UP (ref 96–108)
CHLORIDE SERPL-SCNC: 101 MMOL/L — SIGNIFICANT CHANGE UP (ref 96–108)
CO2 SERPL-SCNC: 24 MMOL/L — SIGNIFICANT CHANGE UP (ref 22–31)
CO2 SERPL-SCNC: 24 MMOL/L — SIGNIFICANT CHANGE UP (ref 22–31)
COLOR SPEC: YELLOW — SIGNIFICANT CHANGE UP
COLOR SPEC: YELLOW — SIGNIFICANT CHANGE UP
CREAT SERPL-MCNC: 0.91 MG/DL — SIGNIFICANT CHANGE UP (ref 0.5–1.3)
CREAT SERPL-MCNC: 0.91 MG/DL — SIGNIFICANT CHANGE UP (ref 0.5–1.3)
DIFF PNL FLD: NEGATIVE — SIGNIFICANT CHANGE UP
DIFF PNL FLD: NEGATIVE — SIGNIFICANT CHANGE UP
EGFR: 62 ML/MIN/1.73M2 — SIGNIFICANT CHANGE UP
EGFR: 62 ML/MIN/1.73M2 — SIGNIFICANT CHANGE UP
EOSINOPHIL # BLD AUTO: 0.15 K/UL — SIGNIFICANT CHANGE UP (ref 0–0.5)
EOSINOPHIL # BLD AUTO: 0.15 K/UL — SIGNIFICANT CHANGE UP (ref 0–0.5)
EOSINOPHIL NFR BLD AUTO: 1.6 % — SIGNIFICANT CHANGE UP (ref 0–6)
EOSINOPHIL NFR BLD AUTO: 1.6 % — SIGNIFICANT CHANGE UP (ref 0–6)
GLUCOSE SERPL-MCNC: 116 MG/DL — HIGH (ref 70–99)
GLUCOSE SERPL-MCNC: 116 MG/DL — HIGH (ref 70–99)
GLUCOSE UR QL: NEGATIVE MG/DL — SIGNIFICANT CHANGE UP
GLUCOSE UR QL: NEGATIVE MG/DL — SIGNIFICANT CHANGE UP
HCT VFR BLD CALC: 45.8 % — HIGH (ref 34.5–45)
HCT VFR BLD CALC: 45.8 % — HIGH (ref 34.5–45)
HGB BLD-MCNC: 15.6 G/DL — HIGH (ref 11.5–15.5)
HGB BLD-MCNC: 15.6 G/DL — HIGH (ref 11.5–15.5)
IMM GRANULOCYTES NFR BLD AUTO: 0.2 % — SIGNIFICANT CHANGE UP (ref 0–0.9)
IMM GRANULOCYTES NFR BLD AUTO: 0.2 % — SIGNIFICANT CHANGE UP (ref 0–0.9)
KETONES UR-MCNC: NEGATIVE MG/DL — SIGNIFICANT CHANGE UP
KETONES UR-MCNC: NEGATIVE MG/DL — SIGNIFICANT CHANGE UP
LEUKOCYTE ESTERASE UR-ACNC: NEGATIVE — SIGNIFICANT CHANGE UP
LEUKOCYTE ESTERASE UR-ACNC: NEGATIVE — SIGNIFICANT CHANGE UP
LYMPHOCYTES # BLD AUTO: 3.13 K/UL — SIGNIFICANT CHANGE UP (ref 1–3.3)
LYMPHOCYTES # BLD AUTO: 3.13 K/UL — SIGNIFICANT CHANGE UP (ref 1–3.3)
LYMPHOCYTES # BLD AUTO: 33.9 % — SIGNIFICANT CHANGE UP (ref 13–44)
LYMPHOCYTES # BLD AUTO: 33.9 % — SIGNIFICANT CHANGE UP (ref 13–44)
MCHC RBC-ENTMCNC: 32.3 PG — SIGNIFICANT CHANGE UP (ref 27–34)
MCHC RBC-ENTMCNC: 32.3 PG — SIGNIFICANT CHANGE UP (ref 27–34)
MCHC RBC-ENTMCNC: 34.1 GM/DL — SIGNIFICANT CHANGE UP (ref 32–36)
MCHC RBC-ENTMCNC: 34.1 GM/DL — SIGNIFICANT CHANGE UP (ref 32–36)
MCV RBC AUTO: 94.8 FL — SIGNIFICANT CHANGE UP (ref 80–100)
MCV RBC AUTO: 94.8 FL — SIGNIFICANT CHANGE UP (ref 80–100)
MONOCYTES # BLD AUTO: 0.63 K/UL — SIGNIFICANT CHANGE UP (ref 0–0.9)
MONOCYTES # BLD AUTO: 0.63 K/UL — SIGNIFICANT CHANGE UP (ref 0–0.9)
MONOCYTES NFR BLD AUTO: 6.8 % — SIGNIFICANT CHANGE UP (ref 2–14)
MONOCYTES NFR BLD AUTO: 6.8 % — SIGNIFICANT CHANGE UP (ref 2–14)
NEUTROPHILS # BLD AUTO: 5.27 K/UL — SIGNIFICANT CHANGE UP (ref 1.8–7.4)
NEUTROPHILS # BLD AUTO: 5.27 K/UL — SIGNIFICANT CHANGE UP (ref 1.8–7.4)
NEUTROPHILS NFR BLD AUTO: 57.2 % — SIGNIFICANT CHANGE UP (ref 43–77)
NEUTROPHILS NFR BLD AUTO: 57.2 % — SIGNIFICANT CHANGE UP (ref 43–77)
NITRITE UR-MCNC: NEGATIVE — SIGNIFICANT CHANGE UP
NITRITE UR-MCNC: NEGATIVE — SIGNIFICANT CHANGE UP
NRBC # BLD: 0 /100 WBCS — SIGNIFICANT CHANGE UP (ref 0–0)
NRBC # BLD: 0 /100 WBCS — SIGNIFICANT CHANGE UP (ref 0–0)
PH UR: 8 — SIGNIFICANT CHANGE UP (ref 5–8)
PH UR: 8 — SIGNIFICANT CHANGE UP (ref 5–8)
PLATELET # BLD AUTO: 249 K/UL — SIGNIFICANT CHANGE UP (ref 150–400)
PLATELET # BLD AUTO: 249 K/UL — SIGNIFICANT CHANGE UP (ref 150–400)
POTASSIUM SERPL-MCNC: 3.9 MMOL/L — SIGNIFICANT CHANGE UP (ref 3.5–5.3)
POTASSIUM SERPL-MCNC: 3.9 MMOL/L — SIGNIFICANT CHANGE UP (ref 3.5–5.3)
POTASSIUM SERPL-SCNC: 3.9 MMOL/L — SIGNIFICANT CHANGE UP (ref 3.5–5.3)
POTASSIUM SERPL-SCNC: 3.9 MMOL/L — SIGNIFICANT CHANGE UP (ref 3.5–5.3)
PROT SERPL-MCNC: 7.8 G/DL — SIGNIFICANT CHANGE UP (ref 6–8.3)
PROT SERPL-MCNC: 7.8 G/DL — SIGNIFICANT CHANGE UP (ref 6–8.3)
PROT UR-MCNC: NEGATIVE MG/DL — SIGNIFICANT CHANGE UP
PROT UR-MCNC: NEGATIVE MG/DL — SIGNIFICANT CHANGE UP
RBC # BLD: 4.83 M/UL — SIGNIFICANT CHANGE UP (ref 3.8–5.2)
RBC # BLD: 4.83 M/UL — SIGNIFICANT CHANGE UP (ref 3.8–5.2)
RBC # FLD: 12.7 % — SIGNIFICANT CHANGE UP (ref 10.3–14.5)
RBC # FLD: 12.7 % — SIGNIFICANT CHANGE UP (ref 10.3–14.5)
RBC CASTS # UR COMP ASSIST: 0 /HPF — SIGNIFICANT CHANGE UP (ref 0–4)
RBC CASTS # UR COMP ASSIST: 0 /HPF — SIGNIFICANT CHANGE UP (ref 0–4)
SODIUM SERPL-SCNC: 139 MMOL/L — SIGNIFICANT CHANGE UP (ref 135–145)
SODIUM SERPL-SCNC: 139 MMOL/L — SIGNIFICANT CHANGE UP (ref 135–145)
SP GR SPEC: 1.01 — SIGNIFICANT CHANGE UP (ref 1–1.03)
SP GR SPEC: 1.01 — SIGNIFICANT CHANGE UP (ref 1–1.03)
SQUAMOUS # UR AUTO: 0 /HPF — SIGNIFICANT CHANGE UP (ref 0–5)
SQUAMOUS # UR AUTO: 0 /HPF — SIGNIFICANT CHANGE UP (ref 0–5)
UROBILINOGEN FLD QL: 0.2 MG/DL — SIGNIFICANT CHANGE UP (ref 0.2–1)
UROBILINOGEN FLD QL: 0.2 MG/DL — SIGNIFICANT CHANGE UP (ref 0.2–1)
WBC # BLD: 9.23 K/UL — SIGNIFICANT CHANGE UP (ref 3.8–10.5)
WBC # BLD: 9.23 K/UL — SIGNIFICANT CHANGE UP (ref 3.8–10.5)
WBC # FLD AUTO: 9.23 K/UL — SIGNIFICANT CHANGE UP (ref 3.8–10.5)
WBC # FLD AUTO: 9.23 K/UL — SIGNIFICANT CHANGE UP (ref 3.8–10.5)
WBC UR QL: 0 /HPF — SIGNIFICANT CHANGE UP (ref 0–5)
WBC UR QL: 0 /HPF — SIGNIFICANT CHANGE UP (ref 0–5)

## 2023-12-20 PROCEDURE — 70450 CT HEAD/BRAIN W/O DYE: CPT | Mod: 26,MA

## 2023-12-20 PROCEDURE — 99285 EMERGENCY DEPT VISIT HI MDM: CPT

## 2023-12-20 PROCEDURE — 99053 MED SERV 10PM-8AM 24 HR FAC: CPT

## 2023-12-20 RX ORDER — FLUOXETINE HCL 10 MG
20 CAPSULE ORAL DAILY
Refills: 0 | Status: DISCONTINUED | OUTPATIENT
Start: 2023-12-20 | End: 2023-12-26

## 2023-12-20 RX ORDER — SODIUM CHLORIDE 9 MG/ML
500 INJECTION INTRAMUSCULAR; INTRAVENOUS; SUBCUTANEOUS ONCE
Refills: 0 | Status: COMPLETED | OUTPATIENT
Start: 2023-12-20 | End: 2023-12-20

## 2023-12-20 RX ORDER — MECLIZINE HCL 12.5 MG
12.5 TABLET ORAL
Refills: 0 | Status: DISCONTINUED | OUTPATIENT
Start: 2023-12-20 | End: 2023-12-26

## 2023-12-20 RX ORDER — METOPROLOL TARTRATE 50 MG
200 TABLET ORAL DAILY
Refills: 0 | Status: DISCONTINUED | OUTPATIENT
Start: 2023-12-20 | End: 2023-12-26

## 2023-12-20 RX ORDER — ERGOCALCIFEROL 1.25 MG/1
1 CAPSULE ORAL
Refills: 0 | DISCHARGE

## 2023-12-20 RX ORDER — NIFEDIPINE 30 MG
60 TABLET, EXTENDED RELEASE 24 HR ORAL DAILY
Refills: 0 | Status: DISCONTINUED | OUTPATIENT
Start: 2023-12-20 | End: 2023-12-26

## 2023-12-20 RX ORDER — FUROSEMIDE 40 MG
20 TABLET ORAL DAILY
Refills: 0 | Status: DISCONTINUED | OUTPATIENT
Start: 2023-12-20 | End: 2023-12-26

## 2023-12-20 RX ORDER — LOSARTAN POTASSIUM 100 MG/1
100 TABLET, FILM COATED ORAL DAILY
Refills: 0 | Status: DISCONTINUED | OUTPATIENT
Start: 2023-12-20 | End: 2023-12-26

## 2023-12-20 RX ORDER — CLONAZEPAM 1 MG
0.5 TABLET ORAL
Refills: 0 | Status: DISCONTINUED | OUTPATIENT
Start: 2023-12-20 | End: 2023-12-26

## 2023-12-20 RX ADMIN — SODIUM CHLORIDE 500 MILLILITER(S): 9 INJECTION INTRAMUSCULAR; INTRAVENOUS; SUBCUTANEOUS at 11:04

## 2023-12-20 RX ADMIN — Medication 12.5 MILLIGRAM(S): at 18:06

## 2023-12-20 RX ADMIN — Medication 0.5 MILLIGRAM(S): at 18:06

## 2023-12-20 NOTE — ED PROVIDER NOTE - ATTENDING CONTRIBUTION TO CARE
Patient presents with several months of ongoing dizziness.  History obtained mostly from the patient's daughter over the phone as the patient herself is not fully aware of the situation.  She does endorse dizziness but the timeframe of onset is unclear per her report, however per the daughter it has been ongoing for months although she asked the daughter to call an ambulance for her this evening because the dizziness was worse.  No report of shortness of breath, vomiting, diarrhea recently.  Per the daughter her mental status has been slowly declining and there is suspicion about dementia by the family.  She lives with her  who is bedbound and the family is concerned she can no longer care for herself or him adequately at least in the short-term.  Patient will get labs and head imaging to rule out acute neurologic or cardiac process or metabolic or infectious etiology that could be contributing to her dizziness and will require admission for PT/OT, evaluation for possible placement.

## 2023-12-20 NOTE — H&P ADULT - HISTORY OF PRESENT ILLNESS
84 yo female former smoker with PMHx of memory impairment,  dementia, hypertension, anxiety, cataracts, hx of bladder cancer s/p surgical resection and chemo, breast cancer s/p lumpectomy, presenting with c/o dizziness and amily unable to take care of her at home.  Patient was brought in by EMS from her home.  Collateral was obtained from the daughter who states the patient has had dizziness for over a year now and has had outpatient and inpatient workup for the dizziness.  The family is concerned the patient has dementia.  Patient lives with her  who is bedbound.  There is nobody to take care of the patient.  Patient is confused and unable to obtain full history.  Denies any recent illnesses per daughter. 82 yo female former smoker with PMHx of memory impairment,  dementia, hypertension, anxiety, cataracts, hx of bladder cancer s/p surgical resection and chemo, breast cancer s/p lumpectomy, presenting with c/o dizziness and amily unable to take care of her at home.  Patient was brought in by EMS from her home.  Collateral was obtained from the daughter who states the patient has had dizziness for over a year now and has had outpatient and inpatient workup for the dizziness.  The family is concerned the patient has dementia.  Patient lives with her  who is bedbound.  There is nobody to take care of the patient.  Patient is confused and unable to obtain full history.  Denies any recent illnesses per daughter.

## 2023-12-20 NOTE — ED PROVIDER NOTE - OBJECTIVE STATEMENT
Patient is a 83-year-old female with unknown past medical history presents emergency department complaining of dizziness.  Patient was brought in by EMS from her home.  Collateral was obtained from the daughter who states the patient has had dizziness for over a year now and has had outpatient and inpatient workup for the dizziness.  The family is concerned the patient has dementia.  Patient lives with her  who is bedbound.  There is nobody to take care of the patient.  Patient is confused and unable to obtain full history.  Denies any recent illnesses per daughter.

## 2023-12-20 NOTE — CONSULT NOTE ADULT - ASSESSMENT
84 yo female former smoker with memory impairment,  dementia, hypertension, anxiety, cataracts, hx of bladder cancer s/p surgical resection and chemo, breast cancer s/p lumpectomy, presenting with c/o dizziness and family unable to take care of her at home. Daughter  states the patient has had dizziness for over a year now and has had outpatient and inpatient workup for the dizziness.  The family is concerned the patient has dementia.  Patient lives with her  who is bedbound.  There is nobody to take care of the patient.  Patient is confused and unable to obtain full history.     CTH low desnity L cerebellum   TSH WNL     Imprssion:   AMS possible toxic metabolic with undelrying dementia   abnromal CTH, L cereberellar edema, r/o stroke vs mass     - mezline PRN  - b12, RPR, TSH, for reversibel causes of dementia   - check MRI brain with and without ordered   - if stroke found would start asa and statin therapy with LDL goal < 70mg/dL   - can check vessels if stroke confirmed   - Hemoglobin A1c and lipid panel  - TTE  - telemetry  - PT/OT/SS/SLP, OOBC  - check FS, glucose control <180  - GI/DVT ppx  - Counseling on diet, exercise, and medication adherence was done  - Counseling on smoking cessation and alcohol consumption offered when appropriate.  - Pain assessed and judicious use of narcotics when appropriate was discussed.    - Stroke education given when appropriate.  - Importance of fall prevention discussed.   - Differential diagnosis and plan of care discussed with patient and/or family and primary team  - Thank you for allowing me to participate in the care of this patient. Call with questions.   - /darren for placement   Maynor Cartagena MD  Vascular Neurology  Office: [phone]  82 yo female former smoker with memory impairment,  dementia, hypertension, anxiety, cataracts, hx of bladder cancer s/p surgical resection and chemo, breast cancer s/p lumpectomy, presenting with c/o dizziness and family unable to take care of her at home. Daughter  states the patient has had dizziness for over a year now and has had outpatient and inpatient workup for the dizziness.  The family is concerned the patient has dementia.  Patient lives with her  who is bedbound.  There is nobody to take care of the patient.  Patient is confused and unable to obtain full history.     CTH low desnity L cerebellum   TSH WNL     Imprssion:   AMS possible toxic metabolic with undelrying dementia   abnromal CTH, L cereberellar edema, r/o stroke vs mass     - mezline PRN  - b12, RPR, TSH, for reversibel causes of dementia   - check MRI brain with and without ordered   - if stroke found would start asa and statin therapy with LDL goal < 70mg/dL   - can check vessels if stroke confirmed   - Hemoglobin A1c and lipid panel  - TTE  - telemetry  - PT/OT/SS/SLP, OOBC  - check FS, glucose control <180  - GI/DVT ppx  - Counseling on diet, exercise, and medication adherence was done  - Counseling on smoking cessation and alcohol consumption offered when appropriate.  - Pain assessed and judicious use of narcotics when appropriate was discussed.    - Stroke education given when appropriate.  - Importance of fall prevention discussed.   - Differential diagnosis and plan of care discussed with patient and/or family and primary team  - Thank you for allowing me to participate in the care of this patient. Call with questions.   - /darren for placement   Maynor Cartagena MD  Vascular Neurology  Office: [phone]

## 2023-12-20 NOTE — ED PROVIDER NOTE - PROGRESS NOTE DETAILS
Pt to be admitted for SNF placement, per family given pt not able to be home alone caring for herself or bedbound  this is safest option at this time. MRI and possible neuro cs while hospitalized to f/u CT results.  Spoke with Dr. Uribe who accepted pt for admission. Jennifer Vicente PA-C Pt received at sign out, resting comfortably, feeding herself breakfast. Pending CT read. Neuro exam grossly normal, confused but answers questions. Jennifer Vicente PA-C

## 2023-12-20 NOTE — ED PROVIDER NOTE - PHYSICAL EXAMINATION
Physical Exam:  Gen: NAD, AOx1  Head: NCAT  HEENT: EOMI, PEERLA, normal conjunctiva, tongue midline, oral mucosa moist  Lung: CTAB, no respiratory distress, no wheezes/rhonchi/rales B/L, speaking in full sentences  CV: RRR  Abd: soft, NT, ND, no guarding, no rigidity, no rebound tenderness, no CVA tenderness   MSK: no visible deformities,  Neuro: No focal sensory or motor deficits  Skin: Warm, well perfused, no rash, no leg swelling  Psych: normal affect, calm

## 2023-12-20 NOTE — H&P ADULT - NSHPPHYSICALEXAM_GEN_ALL_CORE
Vital Signs Last 24 Hrs  T(C): 36.7 (20 Dec 2023 09:06), Max: 36.7 (20 Dec 2023 09:06)  T(F): 98.1 (20 Dec 2023 09:06), Max: 98.1 (20 Dec 2023 09:06)  HR: 72 (20 Dec 2023 13:41) (72 - 89)  BP: 149/73 (20 Dec 2023 13:41) (144/88 - 173/108)  BP(mean): --  RR: 20 (20 Dec 2023 13:41) (16 - 20)  SpO2: 98% (20 Dec 2023 13:41) (97% - 100%)    Parameters below as of 20 Dec 2023 13:41  Patient On (Oxygen Delivery Method): room air      PHYSICAL EXAM:  GENERAL: NAD, well-developed  HEAD:  Atraumatic, Normocephalic  EYES: EOMI, PERRLA, conjunctiva and sclera clear  NECK: Supple, No JVD  CHEST/LUNG: Clear to auscultation bilaterally; No wheeze  HEART: Regular rate and rhythm; No murmurs, rubs, or gallops  ABDOMEN: Soft, Nontender, Nondistended; Bowel sounds present  EXTREMITIES:  2+ Peripheral Pulses, No clubbing, cyanosis, or edema  PSYCH: AAOx1-2  NEUROLOGY: non-focal  SKIN: No rashes or lesions

## 2023-12-20 NOTE — ED PROVIDER NOTE - CLINICAL SUMMARY MEDICAL DECISION MAKING FREE TEXT BOX
Given patient presents emergency department for dizziness.  Patient is hemodynamically stable afebrile on presentation.  Patient physical exam is unremarkable this time.  Limited exam and review of systems due to dementia/altered mental status.  Given patient presentation we will obtain labs and CT head to evaluate for any acute pathology.  Patient likely to be admitted for placement and further evaluation.

## 2023-12-20 NOTE — ED ADULT NURSE NOTE - NSFALLUNIVINTERV_ED_ALL_ED
Bed/Stretcher in lowest position, wheels locked, appropriate side rails in place/Call bell, personal items and telephone in reach/Instruct patient to call for assistance before getting out of bed/chair/stretcher/Non-slip footwear applied when patient is off stretcher/[place] to call system/Physically safe environment - no spills, clutter or unnecessary equipment/Purposeful proactive rounding/Room/bathroom lighting operational, light cord in reach Bed/Stretcher in lowest position, wheels locked, appropriate side rails in place/Call bell, personal items and telephone in reach/Instruct patient to call for assistance before getting out of bed/chair/stretcher/Non-slip footwear applied when patient is off stretcher/[place] to call system/Physically safe environment - no spills, clutter or unnecessary equipment/Purposeful proactive rounding/Room/bathroom lighting operational, light cord in reach

## 2023-12-20 NOTE — ED ADULT NURSE NOTE - OBJECTIVE STATEMENT
84 yo female presents to the ED AAox2 ambulatory with complaints of dizziness. PMH HTN, breast ca? pt endorsing has had dizziness over a year now. Pt describes the dizziness as a room spinning sensation. Pt denies complaints of chest pain, SOB, HA, N/V/D. pt endorsing having "green" urine. pt denies no new medication. 82 yo female presents to the ED AAox2 ambulatory with complaints of dizziness. PMH HTN, breast ca? pt endorsing has had dizziness over a year now. Pt describes the dizziness as a room spinning sensation. Pt denies complaints of chest pain, SOB, HA, N/V/D. pt endorsing having "green" urine. pt denies no new medication.

## 2023-12-20 NOTE — ED ADULT NURSE REASSESSMENT NOTE - NS ED NURSE REASSESS COMMENT FT1
Patient straight cathed for urine using sterile technique. Second RN [place] present to confirm sterility. Explained procedure as it was being done - Pt tolerated procedure well. Sterile specimens collected and sent to lab as ordered. drained [id number] ml of urine. Comfort and safety provided. Patient straight cathed for urine using sterile technique. Second RN [place] present to confirm sterility. Explained procedure as it was being done - Pt tolerated procedure well. Sterile specimens collected and sent to lab as ordered. drained [id number] ml of urine. Comfort and safety provided.

## 2023-12-20 NOTE — CONSULT NOTE ADULT - SUBJECTIVE AND OBJECTIVE BOX
Neurology Consult    Reason for Consult: Patient is a 83y old  Female who presents with a chief complaint of     HPI:  84 yo female former smoker with PMHx of memory impairment,  dementia, hypertension, anxiety, cataracts, hx of bladder cancer s/p surgical resection and chemo, breast cancer s/p lumpectomy, presenting with c/o dizziness and amily unable to take care of her at home.  Patient was brought in by EMS from her home.  Collateral was obtained from the daughter who states the patient has had dizziness for over a year now and has had outpatient and inpatient workup for the dizziness.  The family is concerned the patient has dementia.  Patient lives with her  who is bedbound.  There is nobody to take care of the patient.  Patient is confused and unable to obtain full history.  Denies any recent illnesses per daughter        PAST MEDICAL & SURGICAL HISTORY:  Breast cancer      Bladder cancer      H/O resection of stomach      S/P breast lumpectomy      History of bladder surgery          Allergies: Allergies    No Known Allergies    Intolerances        Social History: Denies toxic habits including tobacco, ETOH or illicit drugs.    Family History: FAMILY HISTORY:  FH: hypertension (Mother)    . No family history of strokes    Medications: MEDICATIONS  (STANDING):  clonazePAM  Tablet 0.5 milliGRAM(s) Oral two times a day  FLUoxetine 20 milliGRAM(s) Oral daily  furosemide    Tablet 20 milliGRAM(s) Oral daily  losartan 100 milliGRAM(s) Oral daily  meclizine 12.5 milliGRAM(s) Oral two times a day  metoprolol succinate  milliGRAM(s) Oral daily  NIFEdipine XL 60 milliGRAM(s) Oral daily    MEDICATIONS  (PRN):      Review of Systems:  CONSTITUTIONAL:  No weight loss, fever, chills, weakness or fatigue.  HEENT:  Eyes:  No visual loss, blurred vision, double vision or yellow sclera. Ears, Nose, Throat:  No hearing loss, sneezing, congestion, runny nose or sore throat.  SKIN:  No rash or itching.  CARDIOVASCULAR:  No chest pain, chest pressure or chest discomfort. No palpitations or edema.  RESPIRATORY:  No shortness of breath, cough or sputum.  GASTROINTESTINAL:  No anorexia, nausea, vomiting or diarrhea. No abdominal pain or blood.  GENITOURINARY:  No burning on urination or incontinence   NEUROLOGICAL:  No headache, dizziness, syncope, paralysis, ataxia, numbness or tingling in the extremities. No change in bowel or bladder control. no limb weakness. no vision changes.   MUSCULOSKELETAL:  No muscle, back pain, joint pain or stiffness.  HEMATOLOGIC:  No anemia, bleeding or bruising.  LYMPHATICS:  No enlarged nodes. No history of splenectomy.  PSYCHIATRIC:  No history of depression or anxiety.  ENDOCRINOLOGIC:  No reports of sweating, cold or heat intolerance. No polyuria or polydipsia.      Vitals:  Vital Signs Last 24 Hrs  T(C): 36.7 (20 Dec 2023 09:06), Max: 36.7 (20 Dec 2023 09:06)  T(F): 98.1 (20 Dec 2023 09:06), Max: 98.1 (20 Dec 2023 09:06)  HR: 72 (20 Dec 2023 13:41) (72 - 89)  BP: 149/73 (20 Dec 2023 13:41) (144/88 - 173/108)  BP(mean): --  RR: 20 (20 Dec 2023 13:41) (16 - 20)  SpO2: 98% (20 Dec 2023 13:41) (97% - 100%)    Parameters below as of 20 Dec 2023 13:41  Patient On (Oxygen Delivery Method): room air    Orthostatic VS      General Exam:   General Appearance: Appropriately dressed and in no acute distress       Head: Normocephalic, atraumatic and no dysmorphic features  Ear, Nose, and Throat: Moist mucous membranes  CVS: S1S2+  Resp: No SOB, no wheeze or rhonchi  GI: soft NT/ND  Extremities: No edema or cyanosis  Skin: No bruises or rashes     Neurological Exam:  Mental Status: Awake, alert and oriented x 1-2  Able to follow simple  verbal commands. Able to name and repeat. fluent speech. No obvious aphasia mild dysarthria noted.   Cranial Nerves: PERRL, EOMI, VFFC, sensation V1-V3 intact,  no obvious facial asymmetry, equal elevation of palate, scm/trap 5/5, tongue is midline on protrusion. no obvious papilledema on fundoscopic exam. hearing is grossly intact.   Motor: CHAVEZ uppers 4/5 > lowers 3/5   Sensation: Intact to light touch and pinprick throughout. no right/left confusion. no extinction to tactile on DSS.   Coordination: No dysmetria on FNF   Gait: deferred     Data/Labs/Imaging which I personally reviewed.     Labs:     CBC Full  -  ( 20 Dec 2023 05:20 )  WBC Count : 9.23 K/uL  RBC Count : 4.83 M/uL  Hemoglobin : 15.6 g/dL  Hematocrit : 45.8 %  Platelet Count - Automated : 249 K/uL  Mean Cell Volume : 94.8 fl  Mean Cell Hemoglobin : 32.3 pg  Mean Cell Hemoglobin Concentration : 34.1 gm/dL  Auto Neutrophil # : 5.27 K/uL  Auto Lymphocyte # : 3.13 K/uL  Auto Monocyte # : 0.63 K/uL  Auto Eosinophil # : 0.15 K/uL  Auto Basophil # : 0.03 K/uL  Auto Neutrophil % : 57.2 %  Auto Lymphocyte % : 33.9 %  Auto Monocyte % : 6.8 %  Auto Eosinophil % : 1.6 %  Auto Basophil % : 0.3 %        139  |  101  |  12  ----------------------------<  116<H>  3.9   |  24  |  0.91    Ca    9.8      20 Dec 2023 05:20    TPro  7.8  /  Alb  4.5  /  TBili  0.7  /  DBili  x   /  AST  29  /  ALT  32  /  AlkPhos  54      LIVER FUNCTIONS - ( 20 Dec 2023 05:20 )  Alb: 4.5 g/dL / Pro: 7.8 g/dL / ALK PHOS: 54 U/L / ALT: 32 U/L / AST: 29 U/L / GGT: x             Urinalysis Basic - ( 20 Dec 2023 06:24 )    Color: Yellow / Appearance: Clear / S.006 / pH: x  Gluc: x / Ketone: Negative mg/dL  / Bili: Negative / Urobili: 0.2 mg/dL   Blood: x / Protein: Negative mg/dL / Nitrite: Negative   Leuk Esterase: Negative / RBC: 0 /HPF / WBC 0 /HPF   Sq Epi: x / Non Sq Epi: 0 /HPF / Bacteria: Negative /HPF      < from: CT Head No Cont (23 @ 09:22) >    ACC: [id number] EXAM:  CT BRAIN   ORDERED BY:  LARRY BROCK     PROCEDURE DATE:  2023          INTERPRETATION:  Noncontrast CT of the brain.    CLINICAL INDICATION:  Dizziness    TECHNIQUE : Axial CT scanning of the brain was obtained from the skull   base to the vertex without the administration of intravenous contrast.   Sagittal and coronal reformats were provided.    COMPARISON: CT brain 10/12/2023    FINDINGS:    Subtle nonspecific low density in the left inferior cerebellar hemisphere.    No acute intracranial hemorrhage.    Similar mild physiologic leftward midline shift. No effacement of basal   cisterns. No hydrocephalus.    Mild white matter microvascular ischemic disease.    The visualized paranasal sinuses and mastoid air cells are clear.    IMPRESSION:    Subtle nonspecific low density in the left inferior cerebellar hemisphere   could represent vasogenic edema or chronic ischemic changes.    Correlation with contrast-enhanced MRI of the brain is recommended for   further evaluation.      --- End of Report ---            KALEB URBINA MD; Attending Radiologist  This document has been electronically signed. Dec 20 2023  9:44AM    < end of copied text >       Neurology Consult    Reason for Consult: Patient is a 83y old  Female who presents with a chief complaint of     HPI:  82 yo female former smoker with PMHx of memory impairment,  dementia, hypertension, anxiety, cataracts, hx of bladder cancer s/p surgical resection and chemo, breast cancer s/p lumpectomy, presenting with c/o dizziness and amily unable to take care of her at home.  Patient was brought in by EMS from her home.  Collateral was obtained from the daughter who states the patient has had dizziness for over a year now and has had outpatient and inpatient workup for the dizziness.  The family is concerned the patient has dementia.  Patient lives with her  who is bedbound.  There is nobody to take care of the patient.  Patient is confused and unable to obtain full history.  Denies any recent illnesses per daughter        PAST MEDICAL & SURGICAL HISTORY:  Breast cancer      Bladder cancer      H/O resection of stomach      S/P breast lumpectomy      History of bladder surgery          Allergies: Allergies    No Known Allergies    Intolerances        Social History: Denies toxic habits including tobacco, ETOH or illicit drugs.    Family History: FAMILY HISTORY:  FH: hypertension (Mother)    . No family history of strokes    Medications: MEDICATIONS  (STANDING):  clonazePAM  Tablet 0.5 milliGRAM(s) Oral two times a day  FLUoxetine 20 milliGRAM(s) Oral daily  furosemide    Tablet 20 milliGRAM(s) Oral daily  losartan 100 milliGRAM(s) Oral daily  meclizine 12.5 milliGRAM(s) Oral two times a day  metoprolol succinate  milliGRAM(s) Oral daily  NIFEdipine XL 60 milliGRAM(s) Oral daily    MEDICATIONS  (PRN):      Review of Systems:  CONSTITUTIONAL:  No weight loss, fever, chills, weakness or fatigue.  HEENT:  Eyes:  No visual loss, blurred vision, double vision or yellow sclera. Ears, Nose, Throat:  No hearing loss, sneezing, congestion, runny nose or sore throat.  SKIN:  No rash or itching.  CARDIOVASCULAR:  No chest pain, chest pressure or chest discomfort. No palpitations or edema.  RESPIRATORY:  No shortness of breath, cough or sputum.  GASTROINTESTINAL:  No anorexia, nausea, vomiting or diarrhea. No abdominal pain or blood.  GENITOURINARY:  No burning on urination or incontinence   NEUROLOGICAL:  No headache, dizziness, syncope, paralysis, ataxia, numbness or tingling in the extremities. No change in bowel or bladder control. no limb weakness. no vision changes.   MUSCULOSKELETAL:  No muscle, back pain, joint pain or stiffness.  HEMATOLOGIC:  No anemia, bleeding or bruising.  LYMPHATICS:  No enlarged nodes. No history of splenectomy.  PSYCHIATRIC:  No history of depression or anxiety.  ENDOCRINOLOGIC:  No reports of sweating, cold or heat intolerance. No polyuria or polydipsia.      Vitals:  Vital Signs Last 24 Hrs  T(C): 36.7 (20 Dec 2023 09:06), Max: 36.7 (20 Dec 2023 09:06)  T(F): 98.1 (20 Dec 2023 09:06), Max: 98.1 (20 Dec 2023 09:06)  HR: 72 (20 Dec 2023 13:41) (72 - 89)  BP: 149/73 (20 Dec 2023 13:41) (144/88 - 173/108)  BP(mean): --  RR: 20 (20 Dec 2023 13:41) (16 - 20)  SpO2: 98% (20 Dec 2023 13:41) (97% - 100%)    Parameters below as of 20 Dec 2023 13:41  Patient On (Oxygen Delivery Method): room air    Orthostatic VS      General Exam:   General Appearance: Appropriately dressed and in no acute distress       Head: Normocephalic, atraumatic and no dysmorphic features  Ear, Nose, and Throat: Moist mucous membranes  CVS: S1S2+  Resp: No SOB, no wheeze or rhonchi  GI: soft NT/ND  Extremities: No edema or cyanosis  Skin: No bruises or rashes     Neurological Exam:  Mental Status: Awake, alert and oriented x 1-2  Able to follow simple  verbal commands. Able to name and repeat. fluent speech. No obvious aphasia mild dysarthria noted.   Cranial Nerves: PERRL, EOMI, VFFC, sensation V1-V3 intact,  no obvious facial asymmetry, equal elevation of palate, scm/trap 5/5, tongue is midline on protrusion. no obvious papilledema on fundoscopic exam. hearing is grossly intact.   Motor: CHAVEZ uppers 4/5 > lowers 3/5   Sensation: Intact to light touch and pinprick throughout. no right/left confusion. no extinction to tactile on DSS.   Coordination: No dysmetria on FNF   Gait: deferred     Data/Labs/Imaging which I personally reviewed.     Labs:     CBC Full  -  ( 20 Dec 2023 05:20 )  WBC Count : 9.23 K/uL  RBC Count : 4.83 M/uL  Hemoglobin : 15.6 g/dL  Hematocrit : 45.8 %  Platelet Count - Automated : 249 K/uL  Mean Cell Volume : 94.8 fl  Mean Cell Hemoglobin : 32.3 pg  Mean Cell Hemoglobin Concentration : 34.1 gm/dL  Auto Neutrophil # : 5.27 K/uL  Auto Lymphocyte # : 3.13 K/uL  Auto Monocyte # : 0.63 K/uL  Auto Eosinophil # : 0.15 K/uL  Auto Basophil # : 0.03 K/uL  Auto Neutrophil % : 57.2 %  Auto Lymphocyte % : 33.9 %  Auto Monocyte % : 6.8 %  Auto Eosinophil % : 1.6 %  Auto Basophil % : 0.3 %        139  |  101  |  12  ----------------------------<  116<H>  3.9   |  24  |  0.91    Ca    9.8      20 Dec 2023 05:20    TPro  7.8  /  Alb  4.5  /  TBili  0.7  /  DBili  x   /  AST  29  /  ALT  32  /  AlkPhos  54      LIVER FUNCTIONS - ( 20 Dec 2023 05:20 )  Alb: 4.5 g/dL / Pro: 7.8 g/dL / ALK PHOS: 54 U/L / ALT: 32 U/L / AST: 29 U/L / GGT: x             Urinalysis Basic - ( 20 Dec 2023 06:24 )    Color: Yellow / Appearance: Clear / S.006 / pH: x  Gluc: x / Ketone: Negative mg/dL  / Bili: Negative / Urobili: 0.2 mg/dL   Blood: x / Protein: Negative mg/dL / Nitrite: Negative   Leuk Esterase: Negative / RBC: 0 /HPF / WBC 0 /HPF   Sq Epi: x / Non Sq Epi: 0 /HPF / Bacteria: Negative /HPF      < from: CT Head No Cont (23 @ 09:22) >    ACC: [id number] EXAM:  CT BRAIN   ORDERED BY:  LARRY BROCK     PROCEDURE DATE:  2023          INTERPRETATION:  Noncontrast CT of the brain.    CLINICAL INDICATION:  Dizziness    TECHNIQUE : Axial CT scanning of the brain was obtained from the skull   base to the vertex without the administration of intravenous contrast.   Sagittal and coronal reformats were provided.    COMPARISON: CT brain 10/12/2023    FINDINGS:    Subtle nonspecific low density in the left inferior cerebellar hemisphere.    No acute intracranial hemorrhage.    Similar mild physiologic leftward midline shift. No effacement of basal   cisterns. No hydrocephalus.    Mild white matter microvascular ischemic disease.    The visualized paranasal sinuses and mastoid air cells are clear.    IMPRESSION:    Subtle nonspecific low density in the left inferior cerebellar hemisphere   could represent vasogenic edema or chronic ischemic changes.    Correlation with contrast-enhanced MRI of the brain is recommended for   further evaluation.      --- End of Report ---            KALEB URBINA MD; Attending Radiologist  This document has been electronically signed. Dec 20 2023  9:44AM    < end of copied text >

## 2023-12-20 NOTE — H&P ADULT - ASSESSMENT
82 yo female h/o dementia, htn, bladder ca, breast ca, here with c/o dizziness and family unable to take care of her at home    dizziness  ongoing for years as per family  CT head noted  neuro consulted  consider MRI  meclizine    htn  cont home meds    anxiety   cont home meds    PT eval  sw eval for placement    Advanced care planning was discussed with patient and family.  Advanced care planning forms were reviewed and discussed as appropriate.  Differential diagnosis and plan of care discussed with patient after the evaluation.   Pain assessed and judicious use of narcotics when appropriate was discussed.  Importance of Fall prevention discussed.  Counseling on Smoking and Alcohol cessation was offered when appropriate.  Counseling on Diet, exercise, and medication compliance was done.       Approx 75 minutes spent.

## 2023-12-21 LAB
A1C WITH ESTIMATED AVERAGE GLUCOSE RESULT: 5.4 % — SIGNIFICANT CHANGE UP (ref 4–5.6)
A1C WITH ESTIMATED AVERAGE GLUCOSE RESULT: 5.4 % — SIGNIFICANT CHANGE UP (ref 4–5.6)
ANION GAP SERPL CALC-SCNC: 8 MMOL/L — SIGNIFICANT CHANGE UP (ref 5–17)
ANION GAP SERPL CALC-SCNC: 8 MMOL/L — SIGNIFICANT CHANGE UP (ref 5–17)
BUN SERPL-MCNC: 12 MG/DL — SIGNIFICANT CHANGE UP (ref 7–23)
BUN SERPL-MCNC: 12 MG/DL — SIGNIFICANT CHANGE UP (ref 7–23)
CALCIUM SERPL-MCNC: 9.1 MG/DL — SIGNIFICANT CHANGE UP (ref 8.4–10.5)
CALCIUM SERPL-MCNC: 9.1 MG/DL — SIGNIFICANT CHANGE UP (ref 8.4–10.5)
CHLORIDE SERPL-SCNC: 102 MMOL/L — SIGNIFICANT CHANGE UP (ref 96–108)
CHLORIDE SERPL-SCNC: 102 MMOL/L — SIGNIFICANT CHANGE UP (ref 96–108)
CHOLEST SERPL-MCNC: 182 MG/DL — SIGNIFICANT CHANGE UP
CHOLEST SERPL-MCNC: 182 MG/DL — SIGNIFICANT CHANGE UP
CO2 SERPL-SCNC: 27 MMOL/L — SIGNIFICANT CHANGE UP (ref 22–31)
CO2 SERPL-SCNC: 27 MMOL/L — SIGNIFICANT CHANGE UP (ref 22–31)
CREAT SERPL-MCNC: 0.89 MG/DL — SIGNIFICANT CHANGE UP (ref 0.5–1.3)
CREAT SERPL-MCNC: 0.89 MG/DL — SIGNIFICANT CHANGE UP (ref 0.5–1.3)
CULTURE RESULTS: NO GROWTH — SIGNIFICANT CHANGE UP
CULTURE RESULTS: NO GROWTH — SIGNIFICANT CHANGE UP
EGFR: 64 ML/MIN/1.73M2 — SIGNIFICANT CHANGE UP
EGFR: 64 ML/MIN/1.73M2 — SIGNIFICANT CHANGE UP
ESTIMATED AVERAGE GLUCOSE: 108 MG/DL — SIGNIFICANT CHANGE UP (ref 68–114)
ESTIMATED AVERAGE GLUCOSE: 108 MG/DL — SIGNIFICANT CHANGE UP (ref 68–114)
FOLATE SERPL-MCNC: >20 NG/ML — SIGNIFICANT CHANGE UP
FOLATE SERPL-MCNC: >20 NG/ML — SIGNIFICANT CHANGE UP
GLUCOSE SERPL-MCNC: 104 MG/DL — HIGH (ref 70–99)
GLUCOSE SERPL-MCNC: 104 MG/DL — HIGH (ref 70–99)
HCT VFR BLD CALC: 42 % — SIGNIFICANT CHANGE UP (ref 34.5–45)
HCT VFR BLD CALC: 42 % — SIGNIFICANT CHANGE UP (ref 34.5–45)
HDLC SERPL-MCNC: 45 MG/DL — LOW
HDLC SERPL-MCNC: 45 MG/DL — LOW
HGB BLD-MCNC: 13.9 G/DL — SIGNIFICANT CHANGE UP (ref 11.5–15.5)
HGB BLD-MCNC: 13.9 G/DL — SIGNIFICANT CHANGE UP (ref 11.5–15.5)
LIPID PNL WITH DIRECT LDL SERPL: 114 MG/DL — HIGH
LIPID PNL WITH DIRECT LDL SERPL: 114 MG/DL — HIGH
MCHC RBC-ENTMCNC: 32 PG — SIGNIFICANT CHANGE UP (ref 27–34)
MCHC RBC-ENTMCNC: 32 PG — SIGNIFICANT CHANGE UP (ref 27–34)
MCHC RBC-ENTMCNC: 33.1 GM/DL — SIGNIFICANT CHANGE UP (ref 32–36)
MCHC RBC-ENTMCNC: 33.1 GM/DL — SIGNIFICANT CHANGE UP (ref 32–36)
MCV RBC AUTO: 96.8 FL — SIGNIFICANT CHANGE UP (ref 80–100)
MCV RBC AUTO: 96.8 FL — SIGNIFICANT CHANGE UP (ref 80–100)
NON HDL CHOLESTEROL: 136 MG/DL — HIGH
NON HDL CHOLESTEROL: 136 MG/DL — HIGH
NRBC # BLD: 0 /100 WBCS — SIGNIFICANT CHANGE UP (ref 0–0)
NRBC # BLD: 0 /100 WBCS — SIGNIFICANT CHANGE UP (ref 0–0)
PLATELET # BLD AUTO: 200 K/UL — SIGNIFICANT CHANGE UP (ref 150–400)
PLATELET # BLD AUTO: 200 K/UL — SIGNIFICANT CHANGE UP (ref 150–400)
POTASSIUM SERPL-MCNC: 4.1 MMOL/L — SIGNIFICANT CHANGE UP (ref 3.5–5.3)
POTASSIUM SERPL-MCNC: 4.1 MMOL/L — SIGNIFICANT CHANGE UP (ref 3.5–5.3)
POTASSIUM SERPL-SCNC: 4.1 MMOL/L — SIGNIFICANT CHANGE UP (ref 3.5–5.3)
POTASSIUM SERPL-SCNC: 4.1 MMOL/L — SIGNIFICANT CHANGE UP (ref 3.5–5.3)
RBC # BLD: 4.34 M/UL — SIGNIFICANT CHANGE UP (ref 3.8–5.2)
RBC # BLD: 4.34 M/UL — SIGNIFICANT CHANGE UP (ref 3.8–5.2)
RBC # FLD: 12.9 % — SIGNIFICANT CHANGE UP (ref 10.3–14.5)
RBC # FLD: 12.9 % — SIGNIFICANT CHANGE UP (ref 10.3–14.5)
SODIUM SERPL-SCNC: 137 MMOL/L — SIGNIFICANT CHANGE UP (ref 135–145)
SODIUM SERPL-SCNC: 137 MMOL/L — SIGNIFICANT CHANGE UP (ref 135–145)
SPECIMEN SOURCE: SIGNIFICANT CHANGE UP
SPECIMEN SOURCE: SIGNIFICANT CHANGE UP
TRIGL SERPL-MCNC: 127 MG/DL — SIGNIFICANT CHANGE UP
TRIGL SERPL-MCNC: 127 MG/DL — SIGNIFICANT CHANGE UP
WBC # BLD: 5.76 K/UL — SIGNIFICANT CHANGE UP (ref 3.8–10.5)
WBC # BLD: 5.76 K/UL — SIGNIFICANT CHANGE UP (ref 3.8–10.5)
WBC # FLD AUTO: 5.76 K/UL — SIGNIFICANT CHANGE UP (ref 3.8–10.5)
WBC # FLD AUTO: 5.76 K/UL — SIGNIFICANT CHANGE UP (ref 3.8–10.5)

## 2023-12-21 PROCEDURE — 93306 TTE W/DOPPLER COMPLETE: CPT | Mod: 26

## 2023-12-21 RX ORDER — CLONAZEPAM 1 MG
0.5 TABLET ORAL ONCE
Refills: 0 | Status: DISCONTINUED | OUTPATIENT
Start: 2023-12-21 | End: 2023-12-21

## 2023-12-21 RX ADMIN — Medication 20 MILLIGRAM(S): at 05:23

## 2023-12-21 RX ADMIN — Medication 12.5 MILLIGRAM(S): at 17:24

## 2023-12-21 RX ADMIN — Medication 0.5 MILLIGRAM(S): at 05:23

## 2023-12-21 RX ADMIN — Medication 200 MILLIGRAM(S): at 05:22

## 2023-12-21 RX ADMIN — Medication 20 MILLIGRAM(S): at 11:35

## 2023-12-21 RX ADMIN — Medication 0.5 MILLIGRAM(S): at 17:24

## 2023-12-21 RX ADMIN — Medication 60 MILLIGRAM(S): at 05:22

## 2023-12-21 RX ADMIN — Medication 12.5 MILLIGRAM(S): at 05:22

## 2023-12-21 RX ADMIN — LOSARTAN POTASSIUM 100 MILLIGRAM(S): 100 TABLET, FILM COATED ORAL at 05:23

## 2023-12-21 NOTE — PHYSICAL THERAPY INITIAL EVALUATION ADULT - PERTINENT HX OF CURRENT PROBLEM, REHAB EVAL
83-year-old female PMHx of memory impairment, dementia, hypertension, anxiety, cataracts, hx of bladder cancer s/p surgical resection and chemo, breast cancer s/p lumpectomy; presents emergency department with c/o ongoing dizziness x years.   Collateral was obtained from the daughter who states the patient has had dizziness for over a year now and has had outpatient and inpatient workup for the dizziness.       Head CT: Subtle nonspecific low density in the left inferior cerebellar hemisphere could represent vasogenic edema or chronic ischemic changes. Mild white matter microvascular ischemic disease. No acute intracranial hemorrhage. No hydrocephalus. Correlation with contrast-enhanced MRI of the brain is recommended for further evaluation.

## 2023-12-21 NOTE — PROGRESS NOTE ADULT - SUBJECTIVE AND OBJECTIVE BOX
HILTON REYES  83y Female  MRN:[id number]    Patient is a 83y old  Female who presents with a chief complaint of dizzy    HPI:  84 yo female former smoker with PMHx of memory impairment,  dementia, hypertension, anxiety, cataracts, hx of bladder cancer s/p surgical resection and chemo, breast cancer s/p lumpectomy, presenting with c/o dizziness and amily unable to take care of her at home.  Patient was brought in by EMS from her home.  Collateral was obtained from the daughter who states the patient has had dizziness for over a year now and has had outpatient and inpatient workup for the dizziness.  The family is concerned the patient has dementia.  Patient lives with her  who is bedbound.  There is nobody to take care of the patient.  Patient is confused and unable to obtain full history.  Denies any recent illnesses per daughter. (20 Dec 2023 15:17)      Patient seen and evaluated at bedside. No acute events overnight except as noted.    Interval HPI: no acute events o/n    PAST MEDICAL & SURGICAL HISTORY:  Breast cancer      Bladder cancer      H/O resection of stomach      S/P breast lumpectomy      History of bladder surgery          REVIEW OF SYSTEMS:  as per hpi     VITALS:  Vital Signs Last 24 Hrs  T(C): 36.6 (21 Dec 2023 16:47), Max: 37.2 (20 Dec 2023 19:00)  T(F): 97.8 (21 Dec 2023 16:47), Max: 99 (20 Dec 2023 19:00)  HR: 84 (21 Dec 2023 16:47) (84 - 100)  BP: 117/82 (21 Dec 2023 16:47) (117/71 - 173/106)  BP(mean): --  RR: 18 (21 Dec 2023 16:47) (18 - 20)  SpO2: 99% (21 Dec 2023 16:47) (93% - 99%)    Parameters below as of 21 Dec 2023 16:47  Patient On (Oxygen Delivery Method): room air      CAPILLARY BLOOD GLUCOSE        I&O's Summary      PHYSICAL EXAM:  GENERAL: NAD, well-developed  HEAD:  Atraumatic, Normocephalic  EYES: EOMI, PERRLA, conjunctiva and sclera clear  NECK: Supple, No JVD  CHEST/LUNG: Clear to auscultation bilaterally; No wheeze  HEART: S1, S2; No murmurs, rubs, or gallops  ABDOMEN: Soft, Nontender, Nondistended; Bowel sounds present  EXTREMITIES:  2+ Peripheral Pulses, No clubbing, cyanosis, or edema  PSYCH: Normal affect  NEUROLOGY: AAOX3; non-focal  SKIN: No rashes or lesions    Consultant(s) Notes Reviewed:  [x ] YES  [ ] NO  Care Discussed with Consultants/Other Providers [ x] YES  [ ] NO    MEDS:  MEDICATIONS  (STANDING):  clonazePAM  Tablet 0.5 milliGRAM(s) Oral two times a day  FLUoxetine 20 milliGRAM(s) Oral daily  furosemide    Tablet 20 milliGRAM(s) Oral daily  losartan 100 milliGRAM(s) Oral daily  meclizine 12.5 milliGRAM(s) Oral two times a day  metoprolol succinate  milliGRAM(s) Oral daily  NIFEdipine XL 60 milliGRAM(s) Oral daily    MEDICATIONS  (PRN):    ALLERGIES:  No Known Allergies      LABS:                        13.9   5.76  )-----------( 200      ( 21 Dec 2023 06:51 )             42.0     12-21    137  |  102  |  12  ----------------------------<  104<H>  4.1   |  27  |  0.89    Ca    9.1      21 Dec 2023 06:52    TPro  7.8  /  Alb  4.5  /  TBili  0.7  /  DBili  x   /  AST  29  /  ALT  32  /  AlkPhos  54  12-20          LIVER FUNCTIONS - ( 20 Dec 2023 05:20 )  Alb: 4.5 g/dL / Pro: 7.8 g/dL / ALK PHOS: 54 U/L / ALT: 32 U/L / AST: 29 U/L / GGT: x           Urinalysis Basic - ( 21 Dec 2023 06:52 )    Color: x / Appearance: x / SG: x / pH: x  Gluc: 104 mg/dL / Ketone: x  / Bili: x / Urobili: x   Blood: x / Protein: x / Nitrite: x   Leuk Esterase: x / RBC: x / WBC x   Sq Epi: x / Non Sq Epi: x / Bacteria: x         cultures: Culture Results:   No growth (12-20 @ 06:24)       < from: CT Head No Cont (12.20.23 @ 09:22) >  IMPRESSION:    Subtle nonspecific low density in the left inferior cerebellar hemisphere   could represent vasogenic edema or chronic ischemic changes.    Correlation with contrast-enhanced MRI of the brain is recommended for   further evaluation.      --- End of Report ---    < end of copied text >  < from: TTE W or WO Ultrasound Enhancing Agent (12.21.23 @ 10:04) >  ________________________________________     CONCLUSIONS:      1. Left ventricular cavity is small. Left ventricular systolic function is hyperdynamic. There are no regional wall motion abnormalities seen.    ___________________________________________    < end of copied text >   HILTON REYES  83y Female  MRN:[id number]    Patient is a 83y old  Female who presents with a chief complaint of dizzy    HPI:  84 yo female former smoker with PMHx of memory impairment,  dementia, hypertension, anxiety, cataracts, hx of bladder cancer s/p surgical resection and chemo, breast cancer s/p lumpectomy, presenting with c/o dizziness and amily unable to take care of her at home.  Patient was brought in by EMS from her home.  Collateral was obtained from the daughter who states the patient has had dizziness for over a year now and has had outpatient and inpatient workup for the dizziness.  The family is concerned the patient has dementia.  Patient lives with her  who is bedbound.  There is nobody to take care of the patient.  Patient is confused and unable to obtain full history.  Denies any recent illnesses per daughter. (20 Dec 2023 15:17)      Patient seen and evaluated at bedside. No acute events overnight except as noted.    Interval HPI: no acute events o/n    PAST MEDICAL & SURGICAL HISTORY:  Breast cancer      Bladder cancer      H/O resection of stomach      S/P breast lumpectomy      History of bladder surgery          REVIEW OF SYSTEMS:  as per hpi     VITALS:  Vital Signs Last 24 Hrs  T(C): 36.6 (21 Dec 2023 16:47), Max: 37.2 (20 Dec 2023 19:00)  T(F): 97.8 (21 Dec 2023 16:47), Max: 99 (20 Dec 2023 19:00)  HR: 84 (21 Dec 2023 16:47) (84 - 100)  BP: 117/82 (21 Dec 2023 16:47) (117/71 - 173/106)  BP(mean): --  RR: 18 (21 Dec 2023 16:47) (18 - 20)  SpO2: 99% (21 Dec 2023 16:47) (93% - 99%)    Parameters below as of 21 Dec 2023 16:47  Patient On (Oxygen Delivery Method): room air      CAPILLARY BLOOD GLUCOSE        I&O's Summary      PHYSICAL EXAM:  GENERAL: NAD, well-developed  HEAD:  Atraumatic, Normocephalic  EYES: EOMI, PERRLA, conjunctiva and sclera clear  NECK: Supple, No JVD  CHEST/LUNG: Clear to auscultation bilaterally; No wheeze  HEART: S1, S2; No murmurs, rubs, or gallops  ABDOMEN: Soft, Nontender, Nondistended; Bowel sounds present  EXTREMITIES:  2+ Peripheral Pulses, No clubbing, cyanosis, or edema  PSYCH: Normal affect  NEUROLOGY: AAOX3; non-focal  SKIN: No rashes or lesions    Consultant(s) Notes Reviewed:  [x ] YES  [ ] NO  Care Discussed with Consultants/Other Providers [ x] YES  [ ] NO    MEDS:  MEDICATIONS  (STANDING):  clonazePAM  Tablet 0.5 milliGRAM(s) Oral two times a day  FLUoxetine 20 milliGRAM(s) Oral daily  furosemide    Tablet 20 milliGRAM(s) Oral daily  losartan 100 milliGRAM(s) Oral daily  meclizine 12.5 milliGRAM(s) Oral two times a day  metoprolol succinate  milliGRAM(s) Oral daily  NIFEdipine XL 60 milliGRAM(s) Oral daily    MEDICATIONS  (PRN):    ALLERGIES:  No Known Allergies      LABS:                        13.9   5.76  )-----------( 200      ( 21 Dec 2023 06:51 )             42.0     12-21    137  |  102  |  12  ----------------------------<  104<H>  4.1   |  27  |  0.89    Ca    9.1      21 Dec 2023 06:52    TPro  7.8  /  Alb  4.5  /  TBili  0.7  /  DBili  x   /  AST  29  /  ALT  32  /  AlkPhos  54  12-20          LIVER FUNCTIONS - ( 20 Dec 2023 05:20 )  Alb: 4.5 g/dL / Pro: 7.8 g/dL / ALK PHOS: 54 U/L / ALT: 32 U/L / AST: 29 U/L / GGT: x           Urinalysis Basic - ( 21 Dec 2023 06:52 )    Color: x / Appearance: x / SG: x / pH: x  Gluc: 104 mg/dL / Ketone: x  / Bili: x / Urobili: x   Blood: x / Protein: x / Nitrite: x   Leuk Esterase: x / RBC: x / WBC x   Sq Epi: x / Non Sq Epi: x / Bacteria: x         cultures: Culture Results:   No growth (12-20 @ 06:24)       < from: CT Head No Cont (12.20.23 @ 09:22) >  IMPRESSION:    Subtle nonspecific low density in the left inferior cerebellar hemisphere   could represent vasogenic edema or chronic ischemic changes.    Correlation with contrast-enhanced MRI of the brain is recommended for   further evaluation.      --- End of Report ---    < end of copied text >  < from: TTE W or WO Ultrasound Enhancing Agent (12.21.23 @ 10:04) >  ________________________________________     CONCLUSIONS:      1. Left ventricular cavity is small. Left ventricular systolic function is hyperdynamic. There are no regional wall motion abnormalities seen.    ___________________________________________    < end of copied text >

## 2023-12-21 NOTE — PATIENT PROFILE ADULT - FALL HARM RISK - FACTORS NURSING JUDGEMENT
Spoke with Maren at [facility]. She stated that they received a rx from our office for [place] 7.5mg #20 on 05/24/17. That same day, the patient picked up a rx for [place] 10mg #90 from [facility] prescribed by Dr. Pino monthly.     She was wanting to let us know that they were not going to be filling the Norco 7.5mgs at this time.    Yes

## 2023-12-21 NOTE — PHYSICAL THERAPY INITIAL EVALUATION ADULT - ADDITIONAL COMMENTS
Pt lives with spouse, who is bedbound, in apartment with 0 stairs to entrance. Prior to admission pt was independent with walker, also has wheelchair and cane at home.  Pt has CDPAP aide for 20 hours a  week for ADL assist.  Daughter is the main aide for patient.

## 2023-12-21 NOTE — PATIENT PROFILE ADULT - FUNCTIONAL ASSESSMENT - DAILY ACTIVITY 2.
Follow-up with primary care provider or return to urgent care-ED with any worsening in condition or additional concerns.  
2 = A lot of assistance

## 2023-12-21 NOTE — PROGRESS NOTE ADULT - ASSESSMENT
84 yo female former smoker with memory impairment,  dementia, hypertension, anxiety, cataracts, hx of bladder cancer s/p surgical resection and chemo, breast cancer s/p lumpectomy, presenting with c/o dizziness and family unable to take care of her at home. Daughter  states the patient has had dizziness for over a year now and has had outpatient and inpatient workup for the dizziness.  The family is concerned the patient has dementia.  Patient lives with her  who is bedbound.  There is nobody to take care of the patient.  Patient is confused and unable to obtain full history.     CTH low desnity L cerebellum   TSH WNL   A1c 5.4     Imprssion:   AMS possible toxic metabolic with undelrying dementia   abnromal CTH, L cereberellar edema, r/o stroke vs mass     - mezline PRN  - b12, RPR, TSH, for reversibel causes of dementia   - check MRI brain with and without ordered   - if stroke found would start asa and statin therapy with LDL goal < 70mg/dL   - can check vessels if stroke confirmed   - TTE  - telemetry  - PT/OT/SS/SLP, OOBC  - check FS, glucose control <180  - GI/DVT ppx   - Thank you for allowing me to participate in the care of this patient. Call with questions.   - sw/cm for placement   Maynor Cartagena MD  Vascular Neurology  Office: [phone]  84 yo female former smoker with memory impairment,  dementia, hypertension, anxiety, cataracts, hx of bladder cancer s/p surgical resection and chemo, breast cancer s/p lumpectomy, presenting with c/o dizziness and family unable to take care of her at home. Daughter  states the patient has had dizziness for over a year now and has had outpatient and inpatient workup for the dizziness.  The family is concerned the patient has dementia.  Patient lives with her  who is bedbound.  There is nobody to take care of the patient.  Patient is confused and unable to obtain full history.     CTH low desnity L cerebellum   TSH WNL   A1c 5.4     Imprssion:   AMS possible toxic metabolic with undelrying dementia   abnromal CTH, L cereberellar edema, r/o stroke vs mass     - mezline PRN  - b12, RPR, TSH, for reversibel causes of dementia   - check MRI brain with and without ordered   - if stroke found would start asa and statin therapy with LDL goal < 70mg/dL   - can check vessels if stroke confirmed   - TTE  - telemetry  - PT/OT/SS/SLP, OOBC  - check FS, glucose control <180  - GI/DVT ppx   - Thank you for allowing me to participate in the care of this patient. Call with questions.   - sw/cm for placement   Maynor Cartagena MD  Vascular Neurology  Office: [phone]

## 2023-12-21 NOTE — CHART NOTE - NSCHARTNOTEFT_GEN_A_CORE
84 y/o female presented with dizziness, CTH concerning for edema vs chronic ischemic changes.. Pt is presently refusing MR Head. Will obtain CT head as d/w with Dr. Short  to compare prior CT; Klonopin po .5mg x1 prior to CTH.

## 2023-12-21 NOTE — PATIENT PROFILE ADULT - DO YOU FEEL THREATENED BY OTHERS?
Pt to ed co not feeling well this morning had syncopal episode s/p thyroidectomy 6 weeks ago co feeling feverish but has no idea what her temp is co chills/headache at this time no

## 2023-12-21 NOTE — PATIENT PROFILE ADULT - FALL HARM RISK - HARM RISK INTERVENTIONS
Assistance with ambulation/Assistance OOB with selected safe patient handling equipment/Communicate Risk of Fall with Harm to all staff/Discuss with provider need for PT consult/Monitor gait and stability/Provide patient with walking aids - walker, cane, crutches/Reinforce activity limits and safety measures with patient and family/Tailored Fall Risk Interventions/Visual Cue: Yellow wristband and red socks/Bed in lowest position, wheels locked, appropriate side rails in place/Call bell, personal items and telephone in reach/Instruct patient to call for assistance before getting out of bed or chair/Non-slip footwear when patient is out of bed/[place] to call system/Physically safe environment - no spills, clutter or unnecessary equipment/Purposeful Proactive Rounding/Room/bathroom lighting operational, light cord in reach Assistance with ambulation/Assistance OOB with selected safe patient handling equipment/Communicate Risk of Fall with Harm to all staff/Discuss with provider need for PT consult/Monitor gait and stability/Provide patient with walking aids - walker, cane, crutches/Reinforce activity limits and safety measures with patient and family/Tailored Fall Risk Interventions/Visual Cue: Yellow wristband and red socks/Bed in lowest position, wheels locked, appropriate side rails in place/Call bell, personal items and telephone in reach/Instruct patient to call for assistance before getting out of bed or chair/Non-slip footwear when patient is out of bed/[place] to call system/Physically safe environment - no spills, clutter or unnecessary equipment/Purposeful Proactive Rounding/Room/bathroom lighting operational, light cord in reach

## 2023-12-21 NOTE — PROGRESS NOTE ADULT - SUBJECTIVE AND OBJECTIVE BOX
Neurology        S: patient seen. no neuro changes       Medications: MEDICATIONS  (STANDING):  clonazePAM  Tablet 0.5 milliGRAM(s) Oral two times a day  FLUoxetine 20 milliGRAM(s) Oral daily  furosemide    Tablet 20 milliGRAM(s) Oral daily  losartan 100 milliGRAM(s) Oral daily  meclizine 12.5 milliGRAM(s) Oral two times a day  metoprolol succinate  milliGRAM(s) Oral daily  NIFEdipine XL 60 milliGRAM(s) Oral daily    MEDICATIONS  (PRN):       Vitals:  Vital Signs Last 24 Hrs  T(C): 36.9 (21 Dec 2023 05:30), Max: 37.2 (20 Dec 2023 19:00)  T(F): 98.5 (21 Dec 2023 05:30), Max: 99 (20 Dec 2023 19:00)  HR: 100 (21 Dec 2023 05:30) (72 - 100)  BP: 131/81 (21 Dec 2023 05:30) (131/81 - 173/106)  BP(mean): --  RR: 18 (21 Dec 2023 05:30) (18 - 20)  SpO2: 94% (21 Dec 2023 05:30) (94% - 98%)    Parameters below as of 21 Dec 2023 05:30  Patient On (Oxygen Delivery Method): room air              General Exam:   General Appearance: Appropriately dressed and in no acute distress       Head: Normocephalic, atraumatic and no dysmorphic features  Ear, Nose, and Throat: Moist mucous membranes  CVS: S1S2+  Resp: No SOB, no wheeze or rhonchi  GI: soft NT/ND  Extremities: No edema or cyanosis  Skin: No bruises or rashes     Neurological Exam:  Mental Status: Awake, alert and oriented x 1-2  Able to follow simple  verbal commands. Able to name and repeat. fluent speech. No obvious aphasia mild dysarthria noted.   Cranial Nerves: PERRL, EOMI, VFFC, sensation V1-V3 intact,  no obvious facial asymmetry, equal elevation of palate, scm/trap 5/5, tongue is midline on protrusion. no obvious papilledema on fundoscopic exam. hearing is grossly intact.   Motor: CHAVEZ uppers 4/5 > lowers 3/5   Sensation: Intact to light touch and pinprick throughout. no right/left confusion. no extinction to tactile on DSS.   Coordination: No dysmetria on FNF   Gait: deferred     Data/Labs/Imaging which I personally reviewed.     Labs:         LABS:                          13.9   5.76  )-----------( 200      ( 21 Dec 2023 06:51 )             42.0     12-21    137  |  102  |  12  ----------------------------<  104<H>  4.1   |  27  |  0.89    Ca    9.1      21 Dec 2023 06:52    TPro  7.8  /  Alb  4.5  /  TBili  0.7  /  DBili  x   /  AST  29  /  ALT  32  /  AlkPhos  54  12-20    LIVER FUNCTIONS - ( 20 Dec 2023 05:20 )  Alb: 4.5 g/dL / Pro: 7.8 g/dL / ALK PHOS: 54 U/L / ALT: 32 U/L / AST: 29 U/L / GGT: x             Urinalysis Basic - ( 21 Dec 2023 06:52 )    Color: x / Appearance: x / SG: x / pH: x  Gluc: 104 mg/dL / Ketone: x  / Bili: x / Urobili: x   Blood: x / Protein: x / Nitrite: x   Leuk Esterase: x / RBC: x / WBC x   Sq Epi: x / Non Sq Epi: x / Bacteria: x          < from: CT Head No Cont (12.20.23 @ 09:22) >    ACC: [id number] EXAM:  CT BRAIN   ORDERED BY:  LARRY BROCK     PROCEDURE DATE:  12/20/2023          INTERPRETATION:  Noncontrast CT of the brain.    CLINICAL INDICATION:  Dizziness    TECHNIQUE : Axial CT scanning of the brain was obtained from the skull   base to the vertex without the administration of intravenous contrast.   Sagittal and coronal reformats were provided.    COMPARISON: CT brain 10/12/2023    FINDINGS:    Subtle nonspecific low density in the left inferior cerebellar hemisphere.    No acute intracranial hemorrhage.    Similar mild physiologic leftward midline shift. No effacement of basal   cisterns. No hydrocephalus.    Mild white matter microvascular ischemic disease.    The visualized paranasal sinuses and mastoid air cells are clear.    IMPRESSION:    Subtle nonspecific low density in the left inferior cerebellar hemisphere   could represent vasogenic edema or chronic ischemic changes.    Correlation with contrast-enhanced MRI of the brain is recommended for   further evaluation.      --- End of Report ---            KALEB URBINA MD; Attending Radiologist  This document has been electronically signed. Dec 20 2023  9:44AM    < end of copied text >       Neurology        S: patient seen. no neuro changes       Medications: MEDICATIONS  (STANDING):  clonazePAM  Tablet 0.5 milliGRAM(s) Oral two times a day  FLUoxetine 20 milliGRAM(s) Oral daily  furosemide    Tablet 20 milliGRAM(s) Oral daily  losartan 100 milliGRAM(s) Oral daily  meclizine 12.5 milliGRAM(s) Oral two times a day  metoprolol succinate  milliGRAM(s) Oral daily  NIFEdipine XL 60 milliGRAM(s) Oral daily    MEDICATIONS  (PRN):       Vitals:  Vital Signs Last 24 Hrs  T(C): 36.9 (21 Dec 2023 05:30), Max: 37.2 (20 Dec 2023 19:00)  T(F): 98.5 (21 Dec 2023 05:30), Max: 99 (20 Dec 2023 19:00)  HR: 100 (21 Dec 2023 05:30) (72 - 100)  BP: 131/81 (21 Dec 2023 05:30) (131/81 - 173/106)  BP(mean): --  RR: 18 (21 Dec 2023 05:30) (18 - 20)  SpO2: 94% (21 Dec 2023 05:30) (94% - 98%)    Parameters below as of 21 Dec 2023 05:30  Patient On (Oxygen Delivery Method): room air              General Exam:   General Appearance: Appropriately dressed and in no acute distress       Head: Normocephalic, atraumatic and no dysmorphic features  Ear, Nose, and Throat: Moist mucous membranes  CVS: S1S2+  Resp: No SOB, no wheeze or rhonchi  GI: soft NT/ND  Extremities: No edema or cyanosis  Skin: No bruises or rashes     Neurological Exam:  Mental Status: Awake, alert and oriented x 1-2  Able to follow simple  verbal commands. Able to name and repeat. fluent speech. No obvious aphasia mild dysarthria noted.   Cranial Nerves: PERRL, EOMI, VFFC, sensation V1-V3 intact,  no obvious facial asymmetry, equal elevation of palate, scm/trap 5/5, tongue is midline on protrusion. no obvious papilledema on fundoscopic exam. hearing is grossly intact.   Motor: CHAVEZ uppers 4/5 > lowers 3/5   Sensation: Intact to light touch and pinprick throughout. no right/left confusion. no extinction to tactile on DSS.   Coordination: No dysmetria on FNF   Gait: deferred     Data/Labs/Imaging which I personally reviewed.     Labs:         LABS:                          13.9   5.76  )-----------( 200      ( 21 Dec 2023 06:51 )             42.0     12-21    137  |  102  |  12  ----------------------------<  104<H>  4.1   |  27  |  0.89    Ca    9.1      21 Dec 2023 06:52    TPro  7.8  /  Alb  4.5  /  TBili  0.7  /  DBili  x   /  AST  29  /  ALT  32  /  AlkPhos  54  12-20    LIVER FUNCTIONS - ( 20 Dec 2023 05:20 )  Alb: 4.5 g/dL / Pro: 7.8 g/dL / ALK PHOS: 54 U/L / ALT: 32 U/L / AST: 29 U/L / GGT: x             Urinalysis Basic - ( 21 Dec 2023 06:52 )    Color: x / Appearance: x / SG: x / pH: x  Gluc: 104 mg/dL / Ketone: x  / Bili: x / Urobili: x   Blood: x / Protein: x / Nitrite: x   Leuk Esterase: x / RBC: x / WBC x   Sq Epi: x / Non Sq Epi: x / Bacteria: x          < from: CT Head No Cont (12.20.23 @ 09:22) >    ACC: [id number] EXAM:  CT BRAIN   ORDERED BY:  LARRY BROCK     PROCEDURE DATE:  12/20/2023          INTERPRETATION:  Noncontrast CT of the brain.    CLINICAL INDICATION:  Dizziness    TECHNIQUE : Axial CT scanning of the brain was obtained from the skull   base to the vertex without the administration of intravenous contrast.   Sagittal and coronal reformats were provided.    COMPARISON: CT brain 10/12/2023    FINDINGS:    Subtle nonspecific low density in the left inferior cerebellar hemisphere.    No acute intracranial hemorrhage.    Similar mild physiologic leftward midline shift. No effacement of basal   cisterns. No hydrocephalus.    Mild white matter microvascular ischemic disease.    The visualized paranasal sinuses and mastoid air cells are clear.    IMPRESSION:    Subtle nonspecific low density in the left inferior cerebellar hemisphere   could represent vasogenic edema or chronic ischemic changes.    Correlation with contrast-enhanced MRI of the brain is recommended for   further evaluation.      --- End of Report ---            KALEB URBINA MD; Attending Radiologist  This document has been electronically signed. Dec 20 2023  9:44AM    < end of copied text >

## 2023-12-21 NOTE — PROGRESS NOTE ADULT - ASSESSMENT
82 yo female h/o dementia, htn, bladder ca, breast ca, here with c/o dizziness and family unable to take care of her at home    dizziness  ongoing for years as per family  CT head noted  neuro consulted  pending MRI  meclizine    htn  cont home meds    anxiety   cont home meds    PT eval  sw eval for placement    Advanced care planning was discussed with patient and family.  Advanced care planning forms were reviewed and discussed as appropriate.  Differential diagnosis and plan of care discussed with patient after the evaluation.   Pain assessed and judicious use of narcotics when appropriate was discussed.  Importance of Fall prevention discussed.  Counseling on Smoking and Alcohol cessation was offered when appropriate.  Counseling on Diet, exercise, and medication compliance was done.       Approx 75 minutes spent.

## 2023-12-22 PROCEDURE — 70450 CT HEAD/BRAIN W/O DYE: CPT | Mod: 26

## 2023-12-22 PROCEDURE — 70553 MRI BRAIN STEM W/O & W/DYE: CPT | Mod: 26

## 2023-12-22 RX ADMIN — Medication 0.5 MILLIGRAM(S): at 05:50

## 2023-12-22 RX ADMIN — LOSARTAN POTASSIUM 100 MILLIGRAM(S): 100 TABLET, FILM COATED ORAL at 05:50

## 2023-12-22 RX ADMIN — Medication 12.5 MILLIGRAM(S): at 18:02

## 2023-12-22 RX ADMIN — Medication 12.5 MILLIGRAM(S): at 05:50

## 2023-12-22 RX ADMIN — Medication 0.5 MILLIGRAM(S): at 18:02

## 2023-12-22 RX ADMIN — Medication 20 MILLIGRAM(S): at 12:11

## 2023-12-22 RX ADMIN — Medication 200 MILLIGRAM(S): at 05:50

## 2023-12-22 RX ADMIN — Medication 60 MILLIGRAM(S): at 05:50

## 2023-12-22 RX ADMIN — Medication 20 MILLIGRAM(S): at 05:50

## 2023-12-22 NOTE — DIETITIAN INITIAL EVALUATION ADULT - PROBLEM SELECTOR PLAN 1
Patient called and left a message stating that last night she was having random pain with urination.  Patient is also experiencing back pain and cramping.  Tried calling patient but had to leave a message.   Unwitnessed fall at home. Imaging without hemorrhage or fracture.     PT referral for assessment. Likely will need B[facility].      Fall precuations.     Possible related to hyponatremia vs UTI which is being treated as below.

## 2023-12-22 NOTE — DIETITIAN INITIAL EVALUATION ADULT - PERTINENT MEDS FT
MEDICATIONS  (STANDING):  clonazePAM  Tablet 0.5 milliGRAM(s) Oral two times a day  clonazePAM  Tablet 0.5 milliGRAM(s) Oral once  FLUoxetine 20 milliGRAM(s) Oral daily  furosemide    Tablet 20 milliGRAM(s) Oral daily  losartan 100 milliGRAM(s) Oral daily  meclizine 12.5 milliGRAM(s) Oral two times a day  metoprolol succinate  milliGRAM(s) Oral daily  NIFEdipine XL 60 milliGRAM(s) Oral daily    MEDICATIONS  (PRN):

## 2023-12-22 NOTE — DIETITIAN INITIAL EVALUATION ADULT - SIGNS/SYMPTOMS
32 pounds weight loss X 9 months  Pt known to have variable appetite, is a picky eater, dislikes hospital food.

## 2023-12-22 NOTE — DIETITIAN INITIAL EVALUATION ADULT - ADD RECOMMEND
1. Recommend diet liberalization to no therapeutic dietary restrictions. Defer diet/texture modification to medical team/SLP as indicated   2. Encourage  adequate po intake, assist with meals and menu selections, provide alternatives as warranted  3. Honor food preferences as appropriate and available.   4. Will provide Magic Cup 2x/day to help augment PO intakes of meals.  5. Consider adding multivitamin 1x/Day  if no medical contraindications for micronutrient coverage  6. Monitor PO intake, GI tolerance, skin integrity and labs. RD remains available if needed.

## 2023-12-22 NOTE — DIETITIAN INITIAL EVALUATION ADULT - ORAL INTAKE PTA/DIET HISTORY
Per daughter:  -- Pt had fair appetite at home  -- Denies difficulty chewing/swallowing.   -- Pt with on and off diarrhea/constipation at home, denies any regular nausea/vomiting  -- Pt took a Multivitamin at home  -- Daughter tried to provide pt with Ensure supplements at home, however pt disliked them

## 2023-12-22 NOTE — DIETITIAN INITIAL EVALUATION ADULT - NSFNSADHERENCEPTAFT_GEN_A_CORE
RD spoke to daughter via phone , reports mother is a "picky eater" and dislikes hospital food. Food preferences obtained: crackers, cheese, soup, rice krispes, gingerale, ice cream. Pt does not eat meat or drink juice.

## 2023-12-22 NOTE — DIETITIAN INITIAL EVALUATION ADULT - REASON INDICATOR FOR ASSESSMENT
MST Score 2 or >   Information obtained from: Review of pt's current medical record, previous RD documentation from OSH and Phone interview with pt's daughter ([phone]-Tori Vidal) MST Score 2 or >   Information obtained from: Review of pt's current medical record, previous RD documentation from OSH and Phone interview with pt's daughter ([phone]-Tori Vidal)

## 2023-12-22 NOTE — DIETITIAN INITIAL EVALUATION ADULT - REASON FOR ADMISSION
Chart Reviewed, Events Noted  "Patient is a 83y old  Female PMHx of memory impairment,  dementia, hypertension, anxiety, cataracts, hx of bladder cancer s/p surgical resection and chemo, breast cancer s/p lumpectomy, presenting with c/o dizziness and family unable to take care of her at home."

## 2023-12-22 NOTE — PROGRESS NOTE ADULT - ASSESSMENT
84 yo female former smoker with memory impairment,  dementia, hypertension, anxiety, cataracts, hx of bladder cancer s/p surgical resection and chemo, breast cancer s/p lumpectomy, presenting with c/o dizziness and family unable to take care of her at home. Daughter  states the patient has had dizziness for over a year now and has had outpatient and inpatient workup for the dizziness.  The family is concerned the patient has dementia.  Patient lives with her  who is bedbound.  There is nobody to take care of the patient.  Patient is confused and unable to obtain full history.     CTH low desnity L cerebellum   TSH WNL   A1c 5.4     TTE done 12/21 results reveiwed   12/22 CTH uncahnged from left cerebellar hypodensity     Imprssion:   AMS possible toxic metabolic with undelrying dementia   abnromal CTH, L cereberellar edema, r/o stroke vs mass     - mezline PRN  - b12, RPR, TSH, for reversibel causes of dementia   - check MRI brain with and without ordered   - if stroke found would start asa and statin therapy with LDL goal < 70mg/dL   - can check vessels if stroke confirmed   - telemetry  - PT/OT/SS/SLP, OOBC  - check FS, glucose control <180  - GI/DVT ppx   - Thank you for allowing me to participate in the care of this patient. Call with questions.   - cirilo/darren for placement   Maynor Cartagena MD  Vascular Neurology  Office: [phone]  82 yo female former smoker with memory impairment,  dementia, hypertension, anxiety, cataracts, hx of bladder cancer s/p surgical resection and chemo, breast cancer s/p lumpectomy, presenting with c/o dizziness and family unable to take care of her at home. Daughter  states the patient has had dizziness for over a year now and has had outpatient and inpatient workup for the dizziness.  The family is concerned the patient has dementia.  Patient lives with her  who is bedbound.  There is nobody to take care of the patient.  Patient is confused and unable to obtain full history.     CTH low desnity L cerebellum   TSH WNL   A1c 5.4     TTE done 12/21 results reveiwed   12/22 CTH uncahnged from left cerebellar hypodensity     Imprssion:   AMS possible toxic metabolic with undelrying dementia   abnromal CTH, L cereberellar edema, r/o stroke vs mass     - mezline PRN  - b12, RPR, TSH, for reversibel causes of dementia   - check MRI brain with and without ordered   - if stroke found would start asa and statin therapy with LDL goal < 70mg/dL   - can check vessels if stroke confirmed   - telemetry  - PT/OT/SS/SLP, OOBC  - check FS, glucose control <180  - GI/DVT ppx   - Thank you for allowing me to participate in the care of this patient. Call with questions.   - cirilo/darren for placement   Maynor Cartagena MD  Vascular Neurology  Office: [phone]

## 2023-12-22 NOTE — DIETITIAN INITIAL EVALUATION ADULT - REASON
Pt with dementia, unable to get consent for Nutrition Focused Physical Exam, defer exam to when daughter is at pt's bedside.

## 2023-12-22 NOTE — DIETITIAN INITIAL EVALUATION ADULT - NS FNS REASON FOR WEIGHT CHANG
Weights:  - Source: Daughter  - UBW: 148 pounds     Current Admission Weights:  - Dosing weight: 59  kg/ 130.1 pounds (12/20/23)  - Daily weight: None     Weight History per Cosme MONSON:  -73.5 kg/ 162.1 pounds (3/27/23), 70 kg/ 154.35 pounds (7/25/2021)    Weight Change:  -  32 pounds/ 20% weight loss X 9 months, suspected in the setting of variable PO intakes.     IBW: 110 pounds   %IBW: 118.3%

## 2023-12-22 NOTE — DIETITIAN INITIAL EVALUATION ADULT - PERTINENT LABORATORY DATA
12-21    137  |  102  |  12  ----------------------------<  104<H>  4.1   |  27  |  0.89    Ca    9.1      21 Dec 2023 06:52    A1C with Estimated Average Glucose Result: 5.4 % (12-21-23 @ 06:52)

## 2023-12-22 NOTE — PROGRESS NOTE ADULT - SUBJECTIVE AND OBJECTIVE BOX
HILTON REYES  83y Female  MRN:[id number]    Patient is a 83y old  Female who presents with a chief complaint of dizzy    HPI:  84 yo female former smoker with PMHx of memory impairment,  dementia, hypertension, anxiety, cataracts, hx of bladder cancer s/p surgical resection and chemo, breast cancer s/p lumpectomy, presenting with c/o dizziness and amily unable to take care of her at home.  Patient was brought in by EMS from her home.  Collateral was obtained from the daughter who states the patient has had dizziness for over a year now and has had outpatient and inpatient workup for the dizziness.  The family is concerned the patient has dementia.  Patient lives with her  who is bedbound.  There is nobody to take care of the patient.  Patient is confused and unable to obtain full history.  Denies any recent illnesses per daughter. (20 Dec 2023 15:17)      Patient seen and evaluated at bedside. No acute events overnight except as noted.    Interval HPI: no acute events o/n    PAST MEDICAL & SURGICAL HISTORY:  Breast cancer      Bladder cancer      H/O resection of stomach      S/P breast lumpectomy      History of bladder surgery          REVIEW OF SYSTEMS:  as per hpi     VITALS:   Vital Signs Last 24 Hrs  T(C): 36.6 (22 Dec 2023 08:58), Max: 36.6 (21 Dec 2023 16:47)  T(F): 97.9 (22 Dec 2023 08:58), Max: 97.9 (22 Dec 2023 08:58)  HR: 73 (22 Dec 2023 08:58) (73 - 84)  BP: 119/56 (22 Dec 2023 08:58) (117/82 - 119/56)  BP(mean): --  RR: 18 (22 Dec 2023 08:58) (18 - 18)  SpO2: 92% (22 Dec 2023 08:58) (92% - 99%)    Parameters below as of 22 Dec 2023 08:58  Patient On (Oxygen Delivery Method): room air        PHYSICAL EXAM:  GENERAL: NAD, well-developed  HEAD:  Atraumatic, Normocephalic  EYES: EOMI, PERRLA, conjunctiva and sclera clear  NECK: Supple, No JVD  CHEST/LUNG: Clear to auscultation bilaterally; No wheeze  HEART: S1, S2; No murmurs, rubs, or gallops  ABDOMEN: Soft, Nontender, Nondistended; Bowel sounds present  EXTREMITIES:  2+ Peripheral Pulses, No clubbing, cyanosis, or edema  PSYCH: Normal affect  NEUROLOGY: AAOX3; non-focal  SKIN: No rashes or lesions    Consultant(s) Notes Reviewed:  [x ] YES  [ ] NO  Care Discussed with Consultants/Other Providers [ x] YES  [ ] NO    MEDS:   MEDICATIONS  (STANDING):  clonazePAM  Tablet 0.5 milliGRAM(s) Oral two times a day  clonazePAM  Tablet 0.5 milliGRAM(s) Oral once  FLUoxetine 20 milliGRAM(s) Oral daily  furosemide    Tablet 20 milliGRAM(s) Oral daily  losartan 100 milliGRAM(s) Oral daily  meclizine 12.5 milliGRAM(s) Oral two times a day  metoprolol succinate  milliGRAM(s) Oral daily  NIFEdipine XL 60 milliGRAM(s) Oral daily    MEDICATIONS  (PRN):      ALLERGIES:  No Known Allergies      LABS:                                              13.9   5.76  )-----------( 200      ( 21 Dec 2023 06:51 )             42.0   12-21    137  |  102  |  12  ----------------------------<  104<H>  4.1   |  27  |  0.89    Ca    9.1      21 Dec 2023 06:52         cultures: Culture Results:   No growth (12-20 @ 06:24)       < from: CT Head No Cont (12.20.23 @ 09:22) >  IMPRESSION:    Subtle nonspecific low density in the left inferior cerebellar hemisphere   could represent vasogenic edema or chronic ischemic changes.    Correlation with contrast-enhanced MRI of the brain is recommended for   further evaluation.      --- End of Report ---    < end of copied text >  < from: TTE W or WO Ultrasound Enhancing Agent (12.21.23 @ 10:04) >  ________________________________________     CONCLUSIONS:      1. Left ventricular cavity is small. Left ventricular systolic function is hyperdynamic. There are no regional wall motion abnormalities seen.    ___________________________________________    < end of copied text >   HILTON REYES  83y Female  MRN:[id number]    Patient is a 83y old  Female who presents with a chief complaint of dizzy    HPI:  84 yo female former smoker with PMHx of memory impairment,  dementia, hypertension, anxiety, cataracts, hx of bladder cancer s/p surgical resection and chemo, breast cancer s/p lumpectomy, presenting with c/o dizziness and amily unable to take care of her at home.  Patient was brought in by EMS from her home.  Collateral was obtained from the daughter who states the patient has had dizziness for over a year now and has had outpatient and inpatient workup for the dizziness.  The family is concerned the patient has dementia.  Patient lives with her  who is bedbound.  There is nobody to take care of the patient.  Patient is confused and unable to obtain full history.  Denies any recent illnesses per daughter. (20 Dec 2023 15:17)      Patient seen and evaluated at bedside. No acute events overnight except as noted.    Interval HPI: no acute events o/n    PAST MEDICAL & SURGICAL HISTORY:  Breast cancer      Bladder cancer      H/O resection of stomach      S/P breast lumpectomy      History of bladder surgery          REVIEW OF SYSTEMS:  as per hpi     VITALS:   Vital Signs Last 24 Hrs  T(C): 36.6 (22 Dec 2023 08:58), Max: 36.6 (21 Dec 2023 16:47)  T(F): 97.9 (22 Dec 2023 08:58), Max: 97.9 (22 Dec 2023 08:58)  HR: 73 (22 Dec 2023 08:58) (73 - 84)  BP: 119/56 (22 Dec 2023 08:58) (117/82 - 119/56)  BP(mean): --  RR: 18 (22 Dec 2023 08:58) (18 - 18)  SpO2: 92% (22 Dec 2023 08:58) (92% - 99%)    Parameters below as of 22 Dec 2023 08:58  Patient On (Oxygen Delivery Method): room air        PHYSICAL EXAM:  GENERAL: NAD, well-developed  HEAD:  Atraumatic, Normocephalic  EYES: EOMI, PERRLA, conjunctiva and sclera clear  NECK: Supple, No JVD  CHEST/LUNG: Clear to auscultation bilaterally; No wheeze  HEART: S1, S2; No murmurs, rubs, or gallops  ABDOMEN: Soft, Nontender, Nondistended; Bowel sounds present  EXTREMITIES:  2+ Peripheral Pulses, No clubbing, cyanosis, or edema  PSYCH: Normal affect  NEUROLOGY: AAOX3; non-focal  SKIN: No rashes or lesions    Consultant(s) Notes Reviewed:  [x ] YES  [ ] NO  Care Discussed with Consultants/Other Providers [ x] YES  [ ] NO    MEDS:   MEDICATIONS  (STANDING):  clonazePAM  Tablet 0.5 milliGRAM(s) Oral two times a day  clonazePAM  Tablet 0.5 milliGRAM(s) Oral once  FLUoxetine 20 milliGRAM(s) Oral daily  furosemide    Tablet 20 milliGRAM(s) Oral daily  losartan 100 milliGRAM(s) Oral daily  meclizine 12.5 milliGRAM(s) Oral two times a day  metoprolol succinate  milliGRAM(s) Oral daily  NIFEdipine XL 60 milliGRAM(s) Oral daily    MEDICATIONS  (PRN):      ALLERGIES:  No Known Allergies      LABS:                                              13.9   5.76  )-----------( 200      ( 21 Dec 2023 06:51 )             42.0   12-21    137  |  102  |  12  ----------------------------<  104<H>  4.1   |  27  |  0.89    Ca    9.1      21 Dec 2023 06:52         cultures: Culture Results:   No growth (12-20 @ 06:24)       < from: CT Head No Cont (12.20.23 @ 09:22) >  IMPRESSION:    Subtle nonspecific low density in the left inferior cerebellar hemisphere   could represent vasogenic edema or chronic ischemic changes.    Correlation with contrast-enhanced MRI of the brain is recommended for   further evaluation.      --- End of Report ---    < end of copied text >  < from: TTE W or WO Ultrasound Enhancing Agent (12.21.23 @ 10:04) >  ________________________________________     CONCLUSIONS:      1. Left ventricular cavity is small. Left ventricular systolic function is hyperdynamic. There are no regional wall motion abnormalities seen.    ___________________________________________    < end of copied text >

## 2023-12-23 RX ORDER — LATANOPROST 0.05 MG/ML
1 SOLUTION/ DROPS OPHTHALMIC; TOPICAL AT BEDTIME
Refills: 0 | Status: DISCONTINUED | OUTPATIENT
Start: 2023-12-23 | End: 2023-12-26

## 2023-12-23 RX ADMIN — Medication 12.5 MILLIGRAM(S): at 17:48

## 2023-12-23 RX ADMIN — Medication 0.5 MILLIGRAM(S): at 06:55

## 2023-12-23 RX ADMIN — LOSARTAN POTASSIUM 100 MILLIGRAM(S): 100 TABLET, FILM COATED ORAL at 06:55

## 2023-12-23 RX ADMIN — Medication 20 MILLIGRAM(S): at 11:34

## 2023-12-23 RX ADMIN — Medication 0.5 MILLIGRAM(S): at 17:48

## 2023-12-23 RX ADMIN — Medication 200 MILLIGRAM(S): at 06:55

## 2023-12-23 RX ADMIN — LATANOPROST 1 DROP(S): 0.05 SOLUTION/ DROPS OPHTHALMIC; TOPICAL at 22:39

## 2023-12-23 RX ADMIN — Medication 60 MILLIGRAM(S): at 06:56

## 2023-12-23 RX ADMIN — Medication 12.5 MILLIGRAM(S): at 06:55

## 2023-12-23 RX ADMIN — Medication 1 TABLET(S): at 11:35

## 2023-12-23 RX ADMIN — Medication 20 MILLIGRAM(S): at 06:55

## 2023-12-23 NOTE — PROGRESS NOTE ADULT - ASSESSMENT
84 yo female h/o dementia, htn, bladder ca, breast ca, here with c/o dizziness and family unable to take care of her at home    dizziness  ongoing for years as per family  CT head noted  neuro consulted  MRI noted  meclizine    htn  cont home meds    anxiety   cont home meds    PT eval  sw eval for placement  dc planning    Advanced care planning was discussed with patient and family.  Advanced care planning forms were reviewed and discussed as appropriate.  Differential diagnosis and plan of care discussed with patient after the evaluation.   Pain assessed and judicious use of narcotics when appropriate was discussed.  Importance of Fall prevention discussed.  Counseling on Smoking and Alcohol cessation was offered when appropriate.  Counseling on Diet, exercise, and medication compliance was done.       Approx 75 minutes spent. 82 yo female h/o dementia, htn, bladder ca, breast ca, here with c/o dizziness and family unable to take care of her at home    dizziness  ongoing for years as per family  CT head noted  neuro consulted  MRI noted  meclizine    htn  cont home meds    anxiety   cont home meds    PT eval  sw eval for placement  dc planning    Advanced care planning was discussed with patient and family.  Advanced care planning forms were reviewed and discussed as appropriate.  Differential diagnosis and plan of care discussed with patient after the evaluation.   Pain assessed and judicious use of narcotics when appropriate was discussed.  Importance of Fall prevention discussed.  Counseling on Smoking and Alcohol cessation was offered when appropriate.  Counseling on Diet, exercise, and medication compliance was done.       Approx 75 minutes spent.

## 2023-12-23 NOTE — PROGRESS NOTE ADULT - SUBJECTIVE AND OBJECTIVE BOX
HILTON REYES  83y Female  MRN:[id number]    Patient is a 83y old  Female who presents with a chief complaint of dizzy    HPI:  84 yo female former smoker with PMHx of memory impairment,  dementia, hypertension, anxiety, cataracts, hx of bladder cancer s/p surgical resection and chemo, breast cancer s/p lumpectomy, presenting with c/o dizziness and amily unable to take care of her at home.  Patient was brought in by EMS from her home.  Collateral was obtained from the daughter who states the patient has had dizziness for over a year now and has had outpatient and inpatient workup for the dizziness.  The family is concerned the patient has dementia.  Patient lives with her  who is bedbound.  There is nobody to take care of the patient.  Patient is confused and unable to obtain full history.  Denies any recent illnesses per daughter. (20 Dec 2023 15:17)      Patient seen and evaluated at bedside. No acute events overnight except as noted.    Interval HPI: no acute events o/n    PAST MEDICAL & SURGICAL HISTORY:  Breast cancer      Bladder cancer      H/O resection of stomach      S/P breast lumpectomy      History of bladder surgery          REVIEW OF SYSTEMS:  as per hpi     VITALS:   Vital Signs Last 24 Hrs  T(C): 37.1 (23 Dec 2023 17:01), Max: 37.1 (23 Dec 2023 17:01)  T(F): 98.8 (23 Dec 2023 17:01), Max: 98.8 (23 Dec 2023 17:01)  HR: 69 (23 Dec 2023 17:01) (69 - 79)  BP: 142/79 (23 Dec 2023 17:01) (111/72 - 142/79)  BP(mean): --  RR: 18 (23 Dec 2023 17:01) (18 - 18)  SpO2: 95% (23 Dec 2023 17:01) (95% - 95%)    Parameters below as of 23 Dec 2023 17:01  Patient On (Oxygen Delivery Method): room air        PHYSICAL EXAM:  GENERAL: NAD, well-developed  HEAD:  Atraumatic, Normocephalic  EYES: EOMI, PERRLA, conjunctiva and sclera clear  NECK: Supple, No JVD  CHEST/LUNG: Clear to auscultation bilaterally; No wheeze  HEART: S1, S2; No murmurs, rubs, or gallops  ABDOMEN: Soft, Nontender, Nondistended; Bowel sounds present  EXTREMITIES:  2+ Peripheral Pulses, No clubbing, cyanosis, or edema  PSYCH: Normal affect  NEUROLOGY: AAOX3; non-focal  SKIN: No rashes or lesions    Consultant(s) Notes Reviewed:  [x ] YES  [ ] NO  Care Discussed with Consultants/Other Providers [ x] YES  [ ] NO    MEDS:   MEDICATIONS  (STANDING):  clonazePAM  Tablet 0.5 milliGRAM(s) Oral two times a day  FLUoxetine 20 milliGRAM(s) Oral daily  furosemide    Tablet 20 milliGRAM(s) Oral daily  latanoprost 0.005% Ophthalmic Solution 1 Drop(s) Both EYES at bedtime  losartan 100 milliGRAM(s) Oral daily  meclizine 12.5 milliGRAM(s) Oral two times a day  metoprolol succinate  milliGRAM(s) Oral daily  multivitamin 1 Tablet(s) Oral daily  NIFEdipine XL 60 milliGRAM(s) Oral daily    MEDICATIONS  (PRN):      ALLERGIES:  No Known Allergies      LABS:                                                < from: MR Head w/wo IV Cont (12.22.23 @ 15:51) >  IMPRESSION: No acute intracranial hemorrhage, acute ischemia, or abnormal   intracranial enhancement.    Previously seen low-attenuation change within the left cerebellar   hemisphere from the prior two head CT examinations was artifactual.    Similar-appearing mild chronic white matter microvascular type changes.    --- End of Report ---        < end of copied text >         cultures: Culture Results:   No growth (12-20 @ 06:24)       < from: CT Head No Cont (12.20.23 @ 09:22) >  IMPRESSION:    Subtle nonspecific low density in the left inferior cerebellar hemisphere   could represent vasogenic edema or chronic ischemic changes.    Correlation with contrast-enhanced MRI of the brain is recommended for   further evaluation.      --- End of Report ---    < end of copied text >  < from: TTE W or WO Ultrasound Enhancing Agent (12.21.23 @ 10:04) >  ________________________________________     CONCLUSIONS:      1. Left ventricular cavity is small. Left ventricular systolic function is hyperdynamic. There are no regional wall motion abnormalities seen.    ___________________________________________    < end of copied text >   HILTON REYES  83y Female  MRN:[id number]    Patient is a 83y old  Female who presents with a chief complaint of dizzy    HPI:  82 yo female former smoker with PMHx of memory impairment,  dementia, hypertension, anxiety, cataracts, hx of bladder cancer s/p surgical resection and chemo, breast cancer s/p lumpectomy, presenting with c/o dizziness and amily unable to take care of her at home.  Patient was brought in by EMS from her home.  Collateral was obtained from the daughter who states the patient has had dizziness for over a year now and has had outpatient and inpatient workup for the dizziness.  The family is concerned the patient has dementia.  Patient lives with her  who is bedbound.  There is nobody to take care of the patient.  Patient is confused and unable to obtain full history.  Denies any recent illnesses per daughter. (20 Dec 2023 15:17)      Patient seen and evaluated at bedside. No acute events overnight except as noted.    Interval HPI: no acute events o/n    PAST MEDICAL & SURGICAL HISTORY:  Breast cancer      Bladder cancer      H/O resection of stomach      S/P breast lumpectomy      History of bladder surgery          REVIEW OF SYSTEMS:  as per hpi     VITALS:   Vital Signs Last 24 Hrs  T(C): 37.1 (23 Dec 2023 17:01), Max: 37.1 (23 Dec 2023 17:01)  T(F): 98.8 (23 Dec 2023 17:01), Max: 98.8 (23 Dec 2023 17:01)  HR: 69 (23 Dec 2023 17:01) (69 - 79)  BP: 142/79 (23 Dec 2023 17:01) (111/72 - 142/79)  BP(mean): --  RR: 18 (23 Dec 2023 17:01) (18 - 18)  SpO2: 95% (23 Dec 2023 17:01) (95% - 95%)    Parameters below as of 23 Dec 2023 17:01  Patient On (Oxygen Delivery Method): room air        PHYSICAL EXAM:  GENERAL: NAD, well-developed  HEAD:  Atraumatic, Normocephalic  EYES: EOMI, PERRLA, conjunctiva and sclera clear  NECK: Supple, No JVD  CHEST/LUNG: Clear to auscultation bilaterally; No wheeze  HEART: S1, S2; No murmurs, rubs, or gallops  ABDOMEN: Soft, Nontender, Nondistended; Bowel sounds present  EXTREMITIES:  2+ Peripheral Pulses, No clubbing, cyanosis, or edema  PSYCH: Normal affect  NEUROLOGY: AAOX3; non-focal  SKIN: No rashes or lesions    Consultant(s) Notes Reviewed:  [x ] YES  [ ] NO  Care Discussed with Consultants/Other Providers [ x] YES  [ ] NO    MEDS:   MEDICATIONS  (STANDING):  clonazePAM  Tablet 0.5 milliGRAM(s) Oral two times a day  FLUoxetine 20 milliGRAM(s) Oral daily  furosemide    Tablet 20 milliGRAM(s) Oral daily  latanoprost 0.005% Ophthalmic Solution 1 Drop(s) Both EYES at bedtime  losartan 100 milliGRAM(s) Oral daily  meclizine 12.5 milliGRAM(s) Oral two times a day  metoprolol succinate  milliGRAM(s) Oral daily  multivitamin 1 Tablet(s) Oral daily  NIFEdipine XL 60 milliGRAM(s) Oral daily    MEDICATIONS  (PRN):      ALLERGIES:  No Known Allergies      LABS:                                                < from: MR Head w/wo IV Cont (12.22.23 @ 15:51) >  IMPRESSION: No acute intracranial hemorrhage, acute ischemia, or abnormal   intracranial enhancement.    Previously seen low-attenuation change within the left cerebellar   hemisphere from the prior two head CT examinations was artifactual.    Similar-appearing mild chronic white matter microvascular type changes.    --- End of Report ---        < end of copied text >         cultures: Culture Results:   No growth (12-20 @ 06:24)       < from: CT Head No Cont (12.20.23 @ 09:22) >  IMPRESSION:    Subtle nonspecific low density in the left inferior cerebellar hemisphere   could represent vasogenic edema or chronic ischemic changes.    Correlation with contrast-enhanced MRI of the brain is recommended for   further evaluation.      --- End of Report ---    < end of copied text >  < from: TTE W or WO Ultrasound Enhancing Agent (12.21.23 @ 10:04) >  ________________________________________     CONCLUSIONS:      1. Left ventricular cavity is small. Left ventricular systolic function is hyperdynamic. There are no regional wall motion abnormalities seen.    ___________________________________________    < end of copied text >

## 2023-12-23 NOTE — PROGRESS NOTE ADULT - SUBJECTIVE AND OBJECTIVE BOX
Neurology        S: patient seen. no neuro changes MRI neg       Medications: MEDICATIONS  (STANDING):  clonazePAM  Tablet 0.5 milliGRAM(s) Oral two times a day  FLUoxetine 20 milliGRAM(s) Oral daily  furosemide    Tablet 20 milliGRAM(s) Oral daily  latanoprost 0.005% Ophthalmic Solution 1 Drop(s) Both EYES at bedtime  losartan 100 milliGRAM(s) Oral daily  meclizine 12.5 milliGRAM(s) Oral two times a day  metoprolol succinate  milliGRAM(s) Oral daily  multivitamin 1 Tablet(s) Oral daily  NIFEdipine XL 60 milliGRAM(s) Oral daily    MEDICATIONS  (PRN):       Vitals:  Vital Signs Last 24 Hrs  T(C): 36.8 (23 Dec 2023 09:46), Max: 36.8 (23 Dec 2023 09:46)  T(F): 98.2 (23 Dec 2023 09:46), Max: 98.2 (23 Dec 2023 09:46)  HR: 79 (23 Dec 2023 09:46) (79 - 82)  BP: 111/72 (23 Dec 2023 09:46) (111/72 - 137/80)  BP(mean): --  RR: 18 (23 Dec 2023 09:46) (18 - 18)  SpO2: 95% (23 Dec 2023 09:46) (95% - 95%)    Parameters below as of 23 Dec 2023 09:46  Patient On (Oxygen Delivery Method): room air             General Exam:   General Appearance: Appropriately dressed and in no acute distress       Head: Normocephalic, atraumatic and no dysmorphic features  Ear, Nose, and Throat: Moist mucous membranes  CVS: S1S2+  Resp: No SOB, no wheeze or rhonchi  GI: soft NT/ND  Extremities: No edema or cyanosis  Skin: No bruises or rashes     Neurological Exam:  Mental Status: Awake, alert and oriented x 1-2  Able to follow simple  verbal commands. Able to name and repeat. fluent speech. No obvious aphasia mild dysarthria noted.   Cranial Nerves: PERRL, EOMI, VFFC, sensation V1-V3 intact,  no obvious facial asymmetry, equal elevation of palate, scm/trap 5/5, tongue is midline on protrusion. no obvious papilledema on fundoscopic exam. hearing is grossly intact.   Motor: CHAVEZ uppers 4/5 > lowers 3/5   Sensation: Intact to light touch and pinprick throughout. no right/left confusion. no extinction to tactile on DSS.   Coordination: No dysmetria on FNF   Gait: deferred     Data/Labs/Imaging which I personally reviewed.     Labs:       LABS:  no new albs       < from: CT Head No Cont (12.20.23 @ 09:22) >    ACC: [id number] EXAM:  CT BRAIN   ORDERED BY:  LARRY BROCK     PROCEDURE DATE:  12/20/2023          INTERPRETATION:  Noncontrast CT of the brain.    CLINICAL INDICATION:  Dizziness    TECHNIQUE : Axial CT scanning of the brain was obtained from the skull   base to the vertex without the administration of intravenous contrast.   Sagittal and coronal reformats were provided.    COMPARISON: CT brain 10/12/2023    FINDINGS:    Subtle nonspecific low density in the left inferior cerebellar hemisphere.    No acute intracranial hemorrhage.    Similar mild physiologic leftward midline shift. No effacement of basal   cisterns. No hydrocephalus.    Mild white matter microvascular ischemic disease.    The visualized paranasal sinuses and mastoid air cells are clear.    IMPRESSION:    Subtle nonspecific low density in the left inferior cerebellar hemisphere   could represent vasogenic edema or chronic ischemic changes.    Correlation with contrast-enhanced MRI of the brain is recommended for   further evaluation.      --- End of Report ---     < from: MR Head w/wo IV Cont (12.22.23 @ 15:51) >    ACC: [id number] EXAM:  MR BRAIN WAW IC   ORDERED BY: MICHAEL KILPATRICK     PROCEDURE DATE:  12/22/2023          INTERPRETATION:  .    CLINICAL INFORMATION: Stroke versus mass. Edema.    TECHNIQUE: Multiplanar multisequential MRI of the brain was acquired with   and without the administration of IV gadolinium. 6 cc's of IV Gadavist   was administered for the purposes of this examination. 1.5 cc's were   discarded.    COMPARISON: Prior head CT studies dated 12/22/2023 and 12/20/2023.    FINDINGS: Previously seen low-attenuation change in the left cerebellar   hemisphere has no abnormal MR correlate. No abnormal edema signal nor   enhancement is seen in this location. There is no diffusion restriction   in this area.    Multiple somewhat patchyconfluent nonspecific T2/FLAIR hyperintense   signal changes are noted throughout the bihemispheric white matter   without associated mass effect or restricted diffusion. There is no   abnormal brain parenchymal or leptomeningeal enhancement.    Ventricular size and configuration is unremarkable. No abnormal extra   axial fluid collections are seen. Flow-voids are noted throughout the   major intracranial vessels, on the T2 weighted images, consistent with   their patency. The sella turcica and posterior fossa are unremarkable.    Scattered mucosal thickening is seen throughout the paranasal sinuses.   The bilateral tympanomastoid cavities are clear. Calvarial signal appears   within normal limits. The orbits appear unremarkable apart from bilateral   cataract removal.    IMPRESSION: No acute intracranial hemorrhage, acute ischemia, or abnormal   intracranial enhancement.    Previously seen low-attenuation change within the left cerebellar   hemisphere from the prior two head CT examinations was artifactual.    Similar-appearing mild chronic white matter microvascular type changes.    --- End of Report ---            MARK DORSEY MD; Attending Radiologist  This document has been electronically signed. Dec 22 2023  8:38PM    < end of copied text >   Neurology        S: patient seen. no neuro changes MRI neg       Medications: MEDICATIONS  (STANDING):  clonazePAM  Tablet 0.5 milliGRAM(s) Oral two times a day  FLUoxetine 20 milliGRAM(s) Oral daily  furosemide    Tablet 20 milliGRAM(s) Oral daily  latanoprost 0.005% Ophthalmic Solution 1 Drop(s) Both EYES at bedtime  losartan 100 milliGRAM(s) Oral daily  meclizine 12.5 milliGRAM(s) Oral two times a day  metoprolol succinate  milliGRAM(s) Oral daily  multivitamin 1 Tablet(s) Oral daily  NIFEdipine XL 60 milliGRAM(s) Oral daily    MEDICATIONS  (PRN):       Vitals:  Vital Signs Last 24 Hrs  T(C): 36.8 (23 Dec 2023 09:46), Max: 36.8 (23 Dec 2023 09:46)  T(F): 98.2 (23 Dec 2023 09:46), Max: 98.2 (23 Dec 2023 09:46)  HR: 79 (23 Dec 2023 09:46) (79 - 82)  BP: 111/72 (23 Dec 2023 09:46) (111/72 - 137/80)  BP(mean): --  RR: 18 (23 Dec 2023 09:46) (18 - 18)  SpO2: 95% (23 Dec 2023 09:46) (95% - 95%)    Parameters below as of 23 Dec 2023 09:46  Patient On (Oxygen Delivery Method): room air             General Exam:   General Appearance: Appropriately dressed and in no acute distress       Head: Normocephalic, atraumatic and no dysmorphic features  Ear, Nose, and Throat: Moist mucous membranes  CVS: S1S2+  Resp: No SOB, no wheeze or rhonchi  GI: soft NT/ND  Extremities: No edema or cyanosis  Skin: No bruises or rashes     Neurological Exam:  Mental Status: Awake, alert and oriented x 1-2  Able to follow simple  verbal commands. Able to name and repeat. fluent speech. No obvious aphasia mild dysarthria noted.   Cranial Nerves: PERRL, EOMI, VFFC, sensation V1-V3 intact,  no obvious facial asymmetry, equal elevation of palate, scm/trap 5/5, tongue is midline on protrusion. no obvious papilledema on fundoscopic exam. hearing is grossly intact.   Motor: CHAVEZ uppers 4/5 > lowers 3/5   Sensation: Intact to light touch and pinprick throughout. no right/left confusion. no extinction to tactile on DSS.   Coordination: No dysmetria on FNF   Gait: deferred     Data/Labs/Imaging which I personally reviewed.     Labs:       LABS:  no new albs       < from: CT Head No Cont (12.20.23 @ 09:22) >    ACC: [id number] EXAM:  CT BRAIN   ORDERED BY:  LARRY BROCK     PROCEDURE DATE:  12/20/2023          INTERPRETATION:  Noncontrast CT of the brain.    CLINICAL INDICATION:  Dizziness    TECHNIQUE : Axial CT scanning of the brain was obtained from the skull   base to the vertex without the administration of intravenous contrast.   Sagittal and coronal reformats were provided.    COMPARISON: CT brain 10/12/2023    FINDINGS:    Subtle nonspecific low density in the left inferior cerebellar hemisphere.    No acute intracranial hemorrhage.    Similar mild physiologic leftward midline shift. No effacement of basal   cisterns. No hydrocephalus.    Mild white matter microvascular ischemic disease.    The visualized paranasal sinuses and mastoid air cells are clear.    IMPRESSION:    Subtle nonspecific low density in the left inferior cerebellar hemisphere   could represent vasogenic edema or chronic ischemic changes.    Correlation with contrast-enhanced MRI of the brain is recommended for   further evaluation.      --- End of Report ---     < from: MR Head w/wo IV Cont (12.22.23 @ 15:51) >    ACC: [id number] EXAM:  MR BRAIN WAW IC   ORDERED BY: MICHAEL KILPATRICK     PROCEDURE DATE:  12/22/2023          INTERPRETATION:  .    CLINICAL INFORMATION: Stroke versus mass. Edema.    TECHNIQUE: Multiplanar multisequential MRI of the brain was acquired with   and without the administration of IV gadolinium. 6 cc's of IV Gadavist   was administered for the purposes of this examination. 1.5 cc's were   discarded.    COMPARISON: Prior head CT studies dated 12/22/2023 and 12/20/2023.    FINDINGS: Previously seen low-attenuation change in the left cerebellar   hemisphere has no abnormal MR correlate. No abnormal edema signal nor   enhancement is seen in this location. There is no diffusion restriction   in this area.    Multiple somewhat patchyconfluent nonspecific T2/FLAIR hyperintense   signal changes are noted throughout the bihemispheric white matter   without associated mass effect or restricted diffusion. There is no   abnormal brain parenchymal or leptomeningeal enhancement.    Ventricular size and configuration is unremarkable. No abnormal extra   axial fluid collections are seen. Flow-voids are noted throughout the   major intracranial vessels, on the T2 weighted images, consistent with   their patency. The sella turcica and posterior fossa are unremarkable.    Scattered mucosal thickening is seen throughout the paranasal sinuses.   The bilateral tympanomastoid cavities are clear. Calvarial signal appears   within normal limits. The orbits appear unremarkable apart from bilateral   cataract removal.    IMPRESSION: No acute intracranial hemorrhage, acute ischemia, or abnormal   intracranial enhancement.    Previously seen low-attenuation change within the left cerebellar   hemisphere from the prior two head CT examinations was artifactual.    Similar-appearing mild chronic white matter microvascular type changes.    --- End of Report ---            MARK DORSEY MD; Attending Radiologist  This document has been electronically signed. Dec 22 2023  8:38PM    < end of copied text >

## 2023-12-23 NOTE — PROGRESS NOTE ADULT - ASSESSMENT
82 yo female former smoker with memory impairment,  dementia, hypertension, anxiety, cataracts, hx of bladder cancer s/p surgical resection and chemo, breast cancer s/p lumpectomy, presenting with c/o dizziness and family unable to take care of her at home. Daughter  states the patient has had dizziness for over a year now and has had outpatient and inpatient workup for the dizziness.  The family is concerned the patient has dementia.  Patient lives with her  who is bedbound.  There is nobody to take care of the patient.  Patient is confused and unable to obtain full history.     CTH low desnity L cerebellum   TSH WNL   A1c 5.4     TTE done 12/21 results reveiwed   12/22 CTH uncahnged from left cerebellar hypodensity   MRI brain with and w/o obtained. no acute findings.  prior CT findings were artifactual     Imprssion:   AMS possible toxic metabolic with undelrying dementia        - mezline PRN  - b12, RPR, TSH, for reversibel causes of dementia - telemetry  - PT/OT/SS/SLP, OOBC  - check FS, glucose control <180  - GI/DVT ppx   - Thank you for allowing me to participate in the care of this patient. Call with questions.   - sw/cm for placement   no objection to d/c planning   Maynor Cartagena MD  Vascular Neurology  Office: [phone]  82 yo female former smoker with memory impairment,  dementia, hypertension, anxiety, cataracts, hx of bladder cancer s/p surgical resection and chemo, breast cancer s/p lumpectomy, presenting with c/o dizziness and family unable to take care of her at home. Daughter  states the patient has had dizziness for over a year now and has had outpatient and inpatient workup for the dizziness.  The family is concerned the patient has dementia.  Patient lives with her  who is bedbound.  There is nobody to take care of the patient.  Patient is confused and unable to obtain full history.     CTH low desnity L cerebellum   TSH WNL   A1c 5.4     TTE done 12/21 results reveiwed   12/22 CTH uncahnged from left cerebellar hypodensity   MRI brain with and w/o obtained. no acute findings.  prior CT findings were artifactual     Imprssion:   AMS possible toxic metabolic with undelrying dementia        - mezline PRN  - b12, RPR, TSH, for reversibel causes of dementia - telemetry  - PT/OT/SS/SLP, OOBC  - check FS, glucose control <180  - GI/DVT ppx   - Thank you for allowing me to participate in the care of this patient. Call with questions.   - sw/cm for placement   no objection to d/c planning   Maynor Cartagena MD  Vascular Neurology  Office: [phone]

## 2023-12-24 LAB
TSH SERPL-MCNC: 1.71 UIU/ML — SIGNIFICANT CHANGE UP (ref 0.27–4.2)
TSH SERPL-MCNC: 1.71 UIU/ML — SIGNIFICANT CHANGE UP (ref 0.27–4.2)
VIT B12 SERPL-MCNC: 870 PG/ML — SIGNIFICANT CHANGE UP (ref 232–1245)
VIT B12 SERPL-MCNC: 870 PG/ML — SIGNIFICANT CHANGE UP (ref 232–1245)

## 2023-12-24 RX ORDER — LANOLIN ALCOHOL/MO/W.PET/CERES
3 CREAM (GRAM) TOPICAL ONCE
Refills: 0 | Status: COMPLETED | OUTPATIENT
Start: 2023-12-24 | End: 2023-12-24

## 2023-12-24 RX ORDER — LANOLIN ALCOHOL/MO/W.PET/CERES
5 CREAM (GRAM) TOPICAL AT BEDTIME
Refills: 0 | Status: DISCONTINUED | OUTPATIENT
Start: 2023-12-24 | End: 2023-12-26

## 2023-12-24 RX ADMIN — Medication 20 MILLIGRAM(S): at 12:55

## 2023-12-24 RX ADMIN — Medication 12.5 MILLIGRAM(S): at 05:52

## 2023-12-24 RX ADMIN — Medication 5 MILLIGRAM(S): at 21:45

## 2023-12-24 RX ADMIN — Medication 3 MILLIGRAM(S): at 01:14

## 2023-12-24 RX ADMIN — Medication 60 MILLIGRAM(S): at 05:53

## 2023-12-24 RX ADMIN — Medication 20 MILLIGRAM(S): at 05:53

## 2023-12-24 RX ADMIN — Medication 200 MILLIGRAM(S): at 05:53

## 2023-12-24 RX ADMIN — LATANOPROST 1 DROP(S): 0.05 SOLUTION/ DROPS OPHTHALMIC; TOPICAL at 21:46

## 2023-12-24 RX ADMIN — Medication 0.5 MILLIGRAM(S): at 05:52

## 2023-12-24 RX ADMIN — Medication 0.5 MILLIGRAM(S): at 18:56

## 2023-12-24 RX ADMIN — Medication 12.5 MILLIGRAM(S): at 18:56

## 2023-12-24 RX ADMIN — Medication 1 TABLET(S): at 12:55

## 2023-12-24 RX ADMIN — LOSARTAN POTASSIUM 100 MILLIGRAM(S): 100 TABLET, FILM COATED ORAL at 05:52

## 2023-12-24 NOTE — PROGRESS NOTE ADULT - ASSESSMENT
84 yo female former smoker with memory impairment,  dementia, hypertension, anxiety, cataracts, hx of bladder cancer s/p surgical resection and chemo, breast cancer s/p lumpectomy, presenting with c/o dizziness and family unable to take care of her at home. Daughter  states the patient has had dizziness for over a year now and has had outpatient and inpatient workup for the dizziness.  The family is concerned the patient has dementia.  Patient lives with her  who is bedbound.  There is nobody to take care of the patient.  Patient is confused and unable to obtain full history.     CTH low desnity L cerebellum   TSH WNL   A1c 5.4     TTE done 12/21 results reveiwed   12/22 CTH uncahnged from left cerebellar hypodensity   MRI brain with and w/o obtained. no acute findings.  prior CT findings were artifactual     Imprssion:   AMS possible toxic metabolic with undelrying dementia        - mezline PRN  - b12, RPR, TSH, for reversibel causes of dementia - telemetry  - PT/OT/SS/SLP, OOBC  - check FS, glucose control <180  - GI/DVT ppx   - Thank you for allowing me to participate in the care of this patient. Call with questions.   - sw/cm for placement   no objection to d/c planning   Maynor Cartagena MD  Vascular Neurology  Office: [phone]  84 yo female former smoker with memory impairment,  dementia, hypertension, anxiety, cataracts, hx of bladder cancer s/p surgical resection and chemo, breast cancer s/p lumpectomy, presenting with c/o dizziness and family unable to take care of her at home. Daughter  states the patient has had dizziness for over a year now and has had outpatient and inpatient workup for the dizziness.  The family is concerned the patient has dementia.  Patient lives with her  who is bedbound.  There is nobody to take care of the patient.  Patient is confused and unable to obtain full history.     CTH low desnity L cerebellum   TSH WNL   A1c 5.4     TTE done 12/21 results reveiwed   12/22 CTH uncahnged from left cerebellar hypodensity   MRI brain with and w/o obtained. no acute findings.  prior CT findings were artifactual     Imprssion:   AMS possible toxic metabolic with undelrying dementia        - mezline PRN  - b12, RPR, TSH, for reversibel causes of dementia - telemetry  - PT/OT/SS/SLP, OOBC  - check FS, glucose control <180  - GI/DVT ppx   - Thank you for allowing me to participate in the care of this patient. Call with questions.   - sw/cm for placement   no objection to d/c planning   Maynor Cartagena MD  Vascular Neurology  Office: [phone]

## 2023-12-24 NOTE — PROGRESS NOTE ADULT - SUBJECTIVE AND OBJECTIVE BOX
HILTON REYES  83y Female  MRN:[id number]    Patient is a 83y old  Female who presents with a chief complaint of dizzy    HPI:  84 yo female former smoker with PMHx of memory impairment,  dementia, hypertension, anxiety, cataracts, hx of bladder cancer s/p surgical resection and chemo, breast cancer s/p lumpectomy, presenting with c/o dizziness and amily unable to take care of her at home.  Patient was brought in by EMS from her home.  Collateral was obtained from the daughter who states the patient has had dizziness for over a year now and has had outpatient and inpatient workup for the dizziness.  The family is concerned the patient has dementia.  Patient lives with her  who is bedbound.  There is nobody to take care of the patient.  Patient is confused and unable to obtain full history.  Denies any recent illnesses per daughter. (20 Dec 2023 15:17)      Patient seen and evaluated at bedside. No acute events overnight except as noted.    Interval HPI: no acute events o/n    PAST MEDICAL & SURGICAL HISTORY:  Breast cancer      Bladder cancer      H/O resection of stomach      S/P breast lumpectomy      History of bladder surgery          REVIEW OF SYSTEMS:  as per hpi     VITALS:   Vital Signs Last 24 Hrs  T(C): 37 (24 Dec 2023 16:13), Max: 37 (24 Dec 2023 16:13)  T(F): 98.6 (24 Dec 2023 16:13), Max: 98.6 (24 Dec 2023 16:13)  HR: 70 (24 Dec 2023 16:13) (70 - 70)  BP: 149/80 (24 Dec 2023 16:13) (149/80 - 149/80)  BP(mean): --  RR: 18 (24 Dec 2023 16:13) (18 - 18)  SpO2: 92% (24 Dec 2023 16:13) (92% - 92%)    Parameters below as of 24 Dec 2023 16:13  Patient On (Oxygen Delivery Method): room air          PHYSICAL EXAM:  GENERAL: NAD, well-developed  HEAD:  Atraumatic, Normocephalic  EYES: EOMI, PERRLA, conjunctiva and sclera clear  NECK: Supple, No JVD  CHEST/LUNG: Clear to auscultation bilaterally; No wheeze  HEART: S1, S2; No murmurs, rubs, or gallops  ABDOMEN: Soft, Nontender, Nondistended; Bowel sounds present  EXTREMITIES:  2+ Peripheral Pulses, No clubbing, cyanosis, or edema  PSYCH: Normal affect  NEUROLOGY: AAOX3; non-focal  SKIN: No rashes or lesions    Consultant(s) Notes Reviewed:  [x ] YES  [ ] NO  Care Discussed with Consultants/Other Providers [ x] YES  [ ] NO    MEDS:   MEDICATIONS  (STANDING):  clonazePAM  Tablet 0.5 milliGRAM(s) Oral two times a day  FLUoxetine 20 milliGRAM(s) Oral daily  furosemide    Tablet 20 milliGRAM(s) Oral daily  latanoprost 0.005% Ophthalmic Solution 1 Drop(s) Both EYES at bedtime  losartan 100 milliGRAM(s) Oral daily  meclizine 12.5 milliGRAM(s) Oral two times a day  metoprolol succinate  milliGRAM(s) Oral daily  multivitamin 1 Tablet(s) Oral daily  NIFEdipine XL 60 milliGRAM(s) Oral daily    MEDICATIONS  (PRN):        ALLERGIES:  No Known Allergies      LABS:                                                < from: MR Head w/wo IV Cont (12.22.23 @ 15:51) >  IMPRESSION: No acute intracranial hemorrhage, acute ischemia, or abnormal   intracranial enhancement.    Previously seen low-attenuation change within the left cerebellar   hemisphere from the prior two head CT examinations was artifactual.    Similar-appearing mild chronic white matter microvascular type changes.    --- End of Report ---        < end of copied text >         cultures: Culture Results:   No growth (12-20 @ 06:24)       < from: CT Head No Cont (12.20.23 @ 09:22) >  IMPRESSION:    Subtle nonspecific low density in the left inferior cerebellar hemisphere   could represent vasogenic edema or chronic ischemic changes.    Correlation with contrast-enhanced MRI of the brain is recommended for   further evaluation.      --- End of Report ---    < end of copied text >  < from: TTE W or WO Ultrasound Enhancing Agent (12.21.23 @ 10:04) >  ________________________________________     CONCLUSIONS:      1. Left ventricular cavity is small. Left ventricular systolic function is hyperdynamic. There are no regional wall motion abnormalities seen.    ___________________________________________    < end of copied text >   HILTON REYES  83y Female  MRN:[id number]    Patient is a 83y old  Female who presents with a chief complaint of dizzy    HPI:  82 yo female former smoker with PMHx of memory impairment,  dementia, hypertension, anxiety, cataracts, hx of bladder cancer s/p surgical resection and chemo, breast cancer s/p lumpectomy, presenting with c/o dizziness and amily unable to take care of her at home.  Patient was brought in by EMS from her home.  Collateral was obtained from the daughter who states the patient has had dizziness for over a year now and has had outpatient and inpatient workup for the dizziness.  The family is concerned the patient has dementia.  Patient lives with her  who is bedbound.  There is nobody to take care of the patient.  Patient is confused and unable to obtain full history.  Denies any recent illnesses per daughter. (20 Dec 2023 15:17)      Patient seen and evaluated at bedside. No acute events overnight except as noted.    Interval HPI: no acute events o/n    PAST MEDICAL & SURGICAL HISTORY:  Breast cancer      Bladder cancer      H/O resection of stomach      S/P breast lumpectomy      History of bladder surgery          REVIEW OF SYSTEMS:  as per hpi     VITALS:   Vital Signs Last 24 Hrs  T(C): 37 (24 Dec 2023 16:13), Max: 37 (24 Dec 2023 16:13)  T(F): 98.6 (24 Dec 2023 16:13), Max: 98.6 (24 Dec 2023 16:13)  HR: 70 (24 Dec 2023 16:13) (70 - 70)  BP: 149/80 (24 Dec 2023 16:13) (149/80 - 149/80)  BP(mean): --  RR: 18 (24 Dec 2023 16:13) (18 - 18)  SpO2: 92% (24 Dec 2023 16:13) (92% - 92%)    Parameters below as of 24 Dec 2023 16:13  Patient On (Oxygen Delivery Method): room air          PHYSICAL EXAM:  GENERAL: NAD, well-developed  HEAD:  Atraumatic, Normocephalic  EYES: EOMI, PERRLA, conjunctiva and sclera clear  NECK: Supple, No JVD  CHEST/LUNG: Clear to auscultation bilaterally; No wheeze  HEART: S1, S2; No murmurs, rubs, or gallops  ABDOMEN: Soft, Nontender, Nondistended; Bowel sounds present  EXTREMITIES:  2+ Peripheral Pulses, No clubbing, cyanosis, or edema  PSYCH: Normal affect  NEUROLOGY: AAOX3; non-focal  SKIN: No rashes or lesions    Consultant(s) Notes Reviewed:  [x ] YES  [ ] NO  Care Discussed with Consultants/Other Providers [ x] YES  [ ] NO    MEDS:   MEDICATIONS  (STANDING):  clonazePAM  Tablet 0.5 milliGRAM(s) Oral two times a day  FLUoxetine 20 milliGRAM(s) Oral daily  furosemide    Tablet 20 milliGRAM(s) Oral daily  latanoprost 0.005% Ophthalmic Solution 1 Drop(s) Both EYES at bedtime  losartan 100 milliGRAM(s) Oral daily  meclizine 12.5 milliGRAM(s) Oral two times a day  metoprolol succinate  milliGRAM(s) Oral daily  multivitamin 1 Tablet(s) Oral daily  NIFEdipine XL 60 milliGRAM(s) Oral daily    MEDICATIONS  (PRN):        ALLERGIES:  No Known Allergies      LABS:                                                < from: MR Head w/wo IV Cont (12.22.23 @ 15:51) >  IMPRESSION: No acute intracranial hemorrhage, acute ischemia, or abnormal   intracranial enhancement.    Previously seen low-attenuation change within the left cerebellar   hemisphere from the prior two head CT examinations was artifactual.    Similar-appearing mild chronic white matter microvascular type changes.    --- End of Report ---        < end of copied text >         cultures: Culture Results:   No growth (12-20 @ 06:24)       < from: CT Head No Cont (12.20.23 @ 09:22) >  IMPRESSION:    Subtle nonspecific low density in the left inferior cerebellar hemisphere   could represent vasogenic edema or chronic ischemic changes.    Correlation with contrast-enhanced MRI of the brain is recommended for   further evaluation.      --- End of Report ---    < end of copied text >  < from: TTE W or WO Ultrasound Enhancing Agent (12.21.23 @ 10:04) >  ________________________________________     CONCLUSIONS:      1. Left ventricular cavity is small. Left ventricular systolic function is hyperdynamic. There are no regional wall motion abnormalities seen.    ___________________________________________    < end of copied text >

## 2023-12-24 NOTE — PROGRESS NOTE ADULT - ASSESSMENT
82 yo female h/o dementia, htn, bladder ca, breast ca, here with c/o dizziness and family unable to take care of her at home    dizziness  ongoing for years as per family  CT head noted  neuro consulted  MRI noted  meclizine    htn  cont home meds    anxiety   cont home meds    PT eval  sw eval for placement  dc planning    Advanced care planning was discussed with patient and family.  Advanced care planning forms were reviewed and discussed as appropriate.  Differential diagnosis and plan of care discussed with patient after the evaluation.   Pain assessed and judicious use of narcotics when appropriate was discussed.  Importance of Fall prevention discussed.  Counseling on Smoking and Alcohol cessation was offered when appropriate.  Counseling on Diet, exercise, and medication compliance was done.       Approx 75 minutes spent. 84 yo female h/o dementia, htn, bladder ca, breast ca, here with c/o dizziness and family unable to take care of her at home    dizziness  ongoing for years as per family  CT head noted  neuro consulted  MRI noted  meclizine    htn  cont home meds    anxiety   cont home meds    PT eval  sw eval for placement  dc planning    Advanced care planning was discussed with patient and family.  Advanced care planning forms were reviewed and discussed as appropriate.  Differential diagnosis and plan of care discussed with patient after the evaluation.   Pain assessed and judicious use of narcotics when appropriate was discussed.  Importance of Fall prevention discussed.  Counseling on Smoking and Alcohol cessation was offered when appropriate.  Counseling on Diet, exercise, and medication compliance was done.       Approx 75 minutes spent.

## 2023-12-24 NOTE — PROGRESS NOTE ADULT - SUBJECTIVE AND OBJECTIVE BOX
Neurology        S: patient seen. no neuro changes MRI neg         Medications: MEDICATIONS  (STANDING):  clonazePAM  Tablet 0.5 milliGRAM(s) Oral two times a day  FLUoxetine 20 milliGRAM(s) Oral daily  furosemide    Tablet 20 milliGRAM(s) Oral daily  latanoprost 0.005% Ophthalmic Solution 1 Drop(s) Both EYES at bedtime  losartan 100 milliGRAM(s) Oral daily  meclizine 12.5 milliGRAM(s) Oral two times a day  metoprolol succinate  milliGRAM(s) Oral daily  multivitamin 1 Tablet(s) Oral daily  NIFEdipine XL 60 milliGRAM(s) Oral daily    MEDICATIONS  (PRN):       Vitals:  Vital Signs Last 24 Hrs  T(C): 37 (24 Dec 2023 16:13), Max: 37 (24 Dec 2023 16:13)  T(F): 98.6 (24 Dec 2023 16:13), Max: 98.6 (24 Dec 2023 16:13)  HR: 70 (24 Dec 2023 16:13) (70 - 70)  BP: 149/80 (24 Dec 2023 16:13) (149/80 - 149/80)  BP(mean): --  RR: 18 (24 Dec 2023 16:13) (18 - 18)  SpO2: 92% (24 Dec 2023 16:13) (92% - 92%)    Parameters below as of 24 Dec 2023 16:13  Patient On (Oxygen Delivery Method): room air                     General Exam:   General Appearance: Appropriately dressed and in no acute distress       Head: Normocephalic, atraumatic and no dysmorphic features  Ear, Nose, and Throat: Moist mucous membranes  CVS: S1S2+  Resp: No SOB, no wheeze or rhonchi  GI: soft NT/ND  Extremities: No edema or cyanosis  Skin: No bruises or rashes     Neurological Exam:  Mental Status: Awake, alert and oriented x 1-2  Able to follow simple  verbal commands. Able to name and repeat. fluent speech. No obvious aphasia mild dysarthria noted.   Cranial Nerves: PERRL, EOMI, VFFC, sensation V1-V3 intact,  no obvious facial asymmetry, equal elevation of palate, scm/trap 5/5, tongue is midline on protrusion. no obvious papilledema on fundoscopic exam. hearing is grossly intact.   Motor: CHAVEZ uppers 4/5 > lowers 3/5   Sensation: Intact to light touch and pinprick throughout. no right/left confusion. no extinction to tactile on DSS.   Coordination: No dysmetria on FNF   Gait: deferred     Data/Labs/Imaging which I personally reviewed.     Labs:       LABS:  no new albs       < from: CT Head No Cont (12.20.23 @ 09:22) >    ACC: [id number] EXAM:  CT BRAIN   ORDERED BY:  LARRY BROCK     PROCEDURE DATE:  12/20/2023          INTERPRETATION:  Noncontrast CT of the brain.    CLINICAL INDICATION:  Dizziness    TECHNIQUE : Axial CT scanning of the brain was obtained from the skull   base to the vertex without the administration of intravenous contrast.   Sagittal and coronal reformats were provided.    COMPARISON: CT brain 10/12/2023    FINDINGS:    Subtle nonspecific low density in the left inferior cerebellar hemisphere.    No acute intracranial hemorrhage.    Similar mild physiologic leftward midline shift. No effacement of basal   cisterns. No hydrocephalus.    Mild white matter microvascular ischemic disease.    The visualized paranasal sinuses and mastoid air cells are clear.    IMPRESSION:    Subtle nonspecific low density in the left inferior cerebellar hemisphere   could represent vasogenic edema or chronic ischemic changes.    Correlation with contrast-enhanced MRI of the brain is recommended for   further evaluation.      --- End of Report ---     < from: MR Head w/wo IV Cont (12.22.23 @ 15:51) >    ACC: [id number] EXAM:  MR BRAIN WAW IC   ORDERED BY: MICHAEL KILPATRICK     PROCEDURE DATE:  12/22/2023          INTERPRETATION:  .    CLINICAL INFORMATION: Stroke versus mass. Edema.    TECHNIQUE: Multiplanar multisequential MRI of the brain was acquired with   and without the administration of IV gadolinium. 6 cc's of IV Gadavist   was administered for the purposes of this examination. 1.5 cc's were   discarded.    COMPARISON: Prior head CT studies dated 12/22/2023 and 12/20/2023.    FINDINGS: Previously seen low-attenuation change in the left cerebellar   hemisphere has no abnormal MR correlate. No abnormal edema signal nor   enhancement is seen in this location. There is no diffusion restriction   in this area.    Multiple somewhat patchyconfluent nonspecific T2/FLAIR hyperintense   signal changes are noted throughout the bihemispheric white matter   without associated mass effect or restricted diffusion. There is no   abnormal brain parenchymal or leptomeningeal enhancement.    Ventricular size and configuration is unremarkable. No abnormal extra   axial fluid collections are seen. Flow-voids are noted throughout the   major intracranial vessels, on the T2 weighted images, consistent with   their patency. The sella turcica and posterior fossa are unremarkable.    Scattered mucosal thickening is seen throughout the paranasal sinuses.   The bilateral tympanomastoid cavities are clear. Calvarial signal appears   within normal limits. The orbits appear unremarkable apart from bilateral   cataract removal.    IMPRESSION: No acute intracranial hemorrhage, acute ischemia, or abnormal   intracranial enhancement.    Previously seen low-attenuation change within the left cerebellar   hemisphere from the prior two head CT examinations was artifactual.    Similar-appearing mild chronic white matter microvascular type changes.    --- End of Report ---            MARK DORSEY MD; Attending Radiologist  This document has been electronically signed. Dec 22 2023  8:38PM    < end of copied text >   Neurology        S: patient seen. no neuro changes MRI neg         Medications: MEDICATIONS  (STANDING):  clonazePAM  Tablet 0.5 milliGRAM(s) Oral two times a day  FLUoxetine 20 milliGRAM(s) Oral daily  furosemide    Tablet 20 milliGRAM(s) Oral daily  latanoprost 0.005% Ophthalmic Solution 1 Drop(s) Both EYES at bedtime  losartan 100 milliGRAM(s) Oral daily  meclizine 12.5 milliGRAM(s) Oral two times a day  metoprolol succinate  milliGRAM(s) Oral daily  multivitamin 1 Tablet(s) Oral daily  NIFEdipine XL 60 milliGRAM(s) Oral daily    MEDICATIONS  (PRN):       Vitals:  Vital Signs Last 24 Hrs  T(C): 37 (24 Dec 2023 16:13), Max: 37 (24 Dec 2023 16:13)  T(F): 98.6 (24 Dec 2023 16:13), Max: 98.6 (24 Dec 2023 16:13)  HR: 70 (24 Dec 2023 16:13) (70 - 70)  BP: 149/80 (24 Dec 2023 16:13) (149/80 - 149/80)  BP(mean): --  RR: 18 (24 Dec 2023 16:13) (18 - 18)  SpO2: 92% (24 Dec 2023 16:13) (92% - 92%)    Parameters below as of 24 Dec 2023 16:13  Patient On (Oxygen Delivery Method): room air                     General Exam:   General Appearance: Appropriately dressed and in no acute distress       Head: Normocephalic, atraumatic and no dysmorphic features  Ear, Nose, and Throat: Moist mucous membranes  CVS: S1S2+  Resp: No SOB, no wheeze or rhonchi  GI: soft NT/ND  Extremities: No edema or cyanosis  Skin: No bruises or rashes     Neurological Exam:  Mental Status: Awake, alert and oriented x 1-2  Able to follow simple  verbal commands. Able to name and repeat. fluent speech. No obvious aphasia mild dysarthria noted.   Cranial Nerves: PERRL, EOMI, VFFC, sensation V1-V3 intact,  no obvious facial asymmetry, equal elevation of palate, scm/trap 5/5, tongue is midline on protrusion. no obvious papilledema on fundoscopic exam. hearing is grossly intact.   Motor: CHAVEZ uppers 4/5 > lowers 3/5   Sensation: Intact to light touch and pinprick throughout. no right/left confusion. no extinction to tactile on DSS.   Coordination: No dysmetria on FNF   Gait: deferred     Data/Labs/Imaging which I personally reviewed.     Labs:       LABS:  no new albs       < from: CT Head No Cont (12.20.23 @ 09:22) >    ACC: [id number] EXAM:  CT BRAIN   ORDERED BY:  LARRY BROCK     PROCEDURE DATE:  12/20/2023          INTERPRETATION:  Noncontrast CT of the brain.    CLINICAL INDICATION:  Dizziness    TECHNIQUE : Axial CT scanning of the brain was obtained from the skull   base to the vertex without the administration of intravenous contrast.   Sagittal and coronal reformats were provided.    COMPARISON: CT brain 10/12/2023    FINDINGS:    Subtle nonspecific low density in the left inferior cerebellar hemisphere.    No acute intracranial hemorrhage.    Similar mild physiologic leftward midline shift. No effacement of basal   cisterns. No hydrocephalus.    Mild white matter microvascular ischemic disease.    The visualized paranasal sinuses and mastoid air cells are clear.    IMPRESSION:    Subtle nonspecific low density in the left inferior cerebellar hemisphere   could represent vasogenic edema or chronic ischemic changes.    Correlation with contrast-enhanced MRI of the brain is recommended for   further evaluation.      --- End of Report ---     < from: MR Head w/wo IV Cont (12.22.23 @ 15:51) >    ACC: [id number] EXAM:  MR BRAIN WAW IC   ORDERED BY: MICHAEL KILPATRICK     PROCEDURE DATE:  12/22/2023          INTERPRETATION:  .    CLINICAL INFORMATION: Stroke versus mass. Edema.    TECHNIQUE: Multiplanar multisequential MRI of the brain was acquired with   and without the administration of IV gadolinium. 6 cc's of IV Gadavist   was administered for the purposes of this examination. 1.5 cc's were   discarded.    COMPARISON: Prior head CT studies dated 12/22/2023 and 12/20/2023.    FINDINGS: Previously seen low-attenuation change in the left cerebellar   hemisphere has no abnormal MR correlate. No abnormal edema signal nor   enhancement is seen in this location. There is no diffusion restriction   in this area.    Multiple somewhat patchyconfluent nonspecific T2/FLAIR hyperintense   signal changes are noted throughout the bihemispheric white matter   without associated mass effect or restricted diffusion. There is no   abnormal brain parenchymal or leptomeningeal enhancement.    Ventricular size and configuration is unremarkable. No abnormal extra   axial fluid collections are seen. Flow-voids are noted throughout the   major intracranial vessels, on the T2 weighted images, consistent with   their patency. The sella turcica and posterior fossa are unremarkable.    Scattered mucosal thickening is seen throughout the paranasal sinuses.   The bilateral tympanomastoid cavities are clear. Calvarial signal appears   within normal limits. The orbits appear unremarkable apart from bilateral   cataract removal.    IMPRESSION: No acute intracranial hemorrhage, acute ischemia, or abnormal   intracranial enhancement.    Previously seen low-attenuation change within the left cerebellar   hemisphere from the prior two head CT examinations was artifactual.    Similar-appearing mild chronic white matter microvascular type changes.    --- End of Report ---            MARK DORSEY MD; Attending Radiologist  This document has been electronically signed. Dec 22 2023  8:38PM    < end of copied text >

## 2023-12-25 LAB
T PALLIDUM AB TITR SER: NEGATIVE — SIGNIFICANT CHANGE UP
T PALLIDUM AB TITR SER: NEGATIVE — SIGNIFICANT CHANGE UP

## 2023-12-25 RX ORDER — PANTOPRAZOLE SODIUM 20 MG/1
40 TABLET, DELAYED RELEASE ORAL
Refills: 0 | Status: DISCONTINUED | OUTPATIENT
Start: 2023-12-25 | End: 2023-12-26

## 2023-12-25 RX ADMIN — Medication 1 TABLET(S): at 11:06

## 2023-12-25 RX ADMIN — Medication 0.5 MILLIGRAM(S): at 05:06

## 2023-12-25 RX ADMIN — Medication 20 MILLIGRAM(S): at 11:05

## 2023-12-25 RX ADMIN — Medication 12.5 MILLIGRAM(S): at 05:06

## 2023-12-25 RX ADMIN — Medication 12.5 MILLIGRAM(S): at 17:23

## 2023-12-25 RX ADMIN — Medication 0.5 MILLIGRAM(S): at 17:22

## 2023-12-25 RX ADMIN — Medication 5 MILLIGRAM(S): at 21:19

## 2023-12-25 RX ADMIN — PANTOPRAZOLE SODIUM 40 MILLIGRAM(S): 20 TABLET, DELAYED RELEASE ORAL at 18:06

## 2023-12-25 RX ADMIN — Medication 30 MILLILITER(S): at 18:06

## 2023-12-25 RX ADMIN — LOSARTAN POTASSIUM 100 MILLIGRAM(S): 100 TABLET, FILM COATED ORAL at 05:06

## 2023-12-25 RX ADMIN — Medication 60 MILLIGRAM(S): at 05:06

## 2023-12-25 RX ADMIN — Medication 20 MILLIGRAM(S): at 05:06

## 2023-12-25 RX ADMIN — LATANOPROST 1 DROP(S): 0.05 SOLUTION/ DROPS OPHTHALMIC; TOPICAL at 21:19

## 2023-12-25 RX ADMIN — Medication 200 MILLIGRAM(S): at 05:06

## 2023-12-25 NOTE — PROGRESS NOTE ADULT - SUBJECTIVE AND OBJECTIVE BOX
Neurology        S: patient seen. no neuro changes MRI neg         Medications: MEDICATIONS  (STANDING):  clonazePAM  Tablet 0.5 milliGRAM(s) Oral two times a day  FLUoxetine 20 milliGRAM(s) Oral daily  furosemide    Tablet 20 milliGRAM(s) Oral daily  latanoprost 0.005% Ophthalmic Solution 1 Drop(s) Both EYES at bedtime  losartan 100 milliGRAM(s) Oral daily  meclizine 12.5 milliGRAM(s) Oral two times a day  melatonin 5 milliGRAM(s) Oral at bedtime  metoprolol succinate  milliGRAM(s) Oral daily  multivitamin 1 Tablet(s) Oral daily  NIFEdipine XL 60 milliGRAM(s) Oral daily    MEDICATIONS  (PRN):       Vitals:  Vital Signs Last 24 Hrs  T(C): 36.8 (25 Dec 2023 10:36), Max: 37 (24 Dec 2023 16:13)  T(F): 98.3 (25 Dec 2023 10:36), Max: 98.6 (24 Dec 2023 16:13)  HR: 57 (25 Dec 2023 10:36) (57 - 70)  BP: 109/62 (25 Dec 2023 10:36) (109/62 - 149/80)  BP(mean): --  RR: 18 (25 Dec 2023 10:36) (18 - 18)  SpO2: 94% (25 Dec 2023 10:36) (91% - 94%)    Parameters below as of 25 Dec 2023 10:36  Patient On (Oxygen Delivery Method): room air               General Exam:   General Appearance: Appropriately dressed and in no acute distress       Head: Normocephalic, atraumatic and no dysmorphic features  Ear, Nose, and Throat: Moist mucous membranes  CVS: S1S2+  Resp: No SOB, no wheeze or rhonchi  GI: soft NT/ND  Extremities: No edema or cyanosis  Skin: No bruises or rashes     Neurological Exam:  Mental Status: Awake, alert and oriented x 1-2  Able to follow simple  verbal commands. Able to name and repeat. fluent speech. No obvious aphasia mild dysarthria noted.   Cranial Nerves: PERRL, EOMI, VFFC, sensation V1-V3 intact,  no obvious facial asymmetry, equal elevation of palate, scm/trap 5/5, tongue is midline on protrusion. no obvious papilledema on fundoscopic exam. hearing is grossly intact.   Motor: CHAVEZ uppers 4/5 > lowers 3/5   Sensation: Intact to light touch and pinprick throughout. no right/left confusion. no extinction to tactile on DSS.   Coordination: No dysmetria on FNF   Gait: deferred     Data/Labs/Imaging which I personally reviewed.     Labs:       LABS:  no new albs       < from: CT Head No Cont (12.20.23 @ 09:22) >    ACC: [id number] EXAM:  CT BRAIN   ORDERED BY:  LARRY BROCK     PROCEDURE DATE:  12/20/2023          INTERPRETATION:  Noncontrast CT of the brain.    CLINICAL INDICATION:  Dizziness    TECHNIQUE : Axial CT scanning of the brain was obtained from the skull   base to the vertex without the administration of intravenous contrast.   Sagittal and coronal reformats were provided.    COMPARISON: CT brain 10/12/2023    FINDINGS:    Subtle nonspecific low density in the left inferior cerebellar hemisphere.    No acute intracranial hemorrhage.    Similar mild physiologic leftward midline shift. No effacement of basal   cisterns. No hydrocephalus.    Mild white matter microvascular ischemic disease.    The visualized paranasal sinuses and mastoid air cells are clear.    IMPRESSION:    Subtle nonspecific low density in the left inferior cerebellar hemisphere   could represent vasogenic edema or chronic ischemic changes.    Correlation with contrast-enhanced MRI of the brain is recommended for   further evaluation.      --- End of Report ---     < from: MR Head w/wo IV Cont (12.22.23 @ 15:51) >    ACC: [id number] EXAM:  MR BRAIN WAW IC   ORDERED BY: MICHAEL KILPATRICK     PROCEDURE DATE:  12/22/2023          INTERPRETATION:  .    CLINICAL INFORMATION: Stroke versus mass. Edema.    TECHNIQUE: Multiplanar multisequential MRI of the brain was acquired with   and without the administration of IV gadolinium. 6 cc's of IV Gadavist   was administered for the purposes of this examination. 1.5 cc's were   discarded.    COMPARISON: Prior head CT studies dated 12/22/2023 and 12/20/2023.    FINDINGS: Previously seen low-attenuation change in the left cerebellar   hemisphere has no abnormal MR correlate. No abnormal edema signal nor   enhancement is seen in this location. There is no diffusion restriction   in this area.    Multiple somewhat patchyconfluent nonspecific T2/FLAIR hyperintense   signal changes are noted throughout the bihemispheric white matter   without associated mass effect or restricted diffusion. There is no   abnormal brain parenchymal or leptomeningeal enhancement.    Ventricular size and configuration is unremarkable. No abnormal extra   axial fluid collections are seen. Flow-voids are noted throughout the   major intracranial vessels, on the T2 weighted images, consistent with   their patency. The sella turcica and posterior fossa are unremarkable.    Scattered mucosal thickening is seen throughout the paranasal sinuses.   The bilateral tympanomastoid cavities are clear. Calvarial signal appears   within normal limits. The orbits appear unremarkable apart from bilateral   cataract removal.    IMPRESSION: No acute intracranial hemorrhage, acute ischemia, or abnormal   intracranial enhancement.    Previously seen low-attenuation change within the left cerebellar   hemisphere from the prior two head CT examinations was artifactual.    Similar-appearing mild chronic white matter microvascular type changes.    --- End of Report ---            MARK DORSEY MD; Attending Radiologist  This document has been electronically signed. Dec 22 2023  8:38PM    < end of copied text >   Neurology        S: patient seen. no neuro changes MRI neg         Medications: MEDICATIONS  (STANDING):  clonazePAM  Tablet 0.5 milliGRAM(s) Oral two times a day  FLUoxetine 20 milliGRAM(s) Oral daily  furosemide    Tablet 20 milliGRAM(s) Oral daily  latanoprost 0.005% Ophthalmic Solution 1 Drop(s) Both EYES at bedtime  losartan 100 milliGRAM(s) Oral daily  meclizine 12.5 milliGRAM(s) Oral two times a day  melatonin 5 milliGRAM(s) Oral at bedtime  metoprolol succinate  milliGRAM(s) Oral daily  multivitamin 1 Tablet(s) Oral daily  NIFEdipine XL 60 milliGRAM(s) Oral daily    MEDICATIONS  (PRN):       Vitals:  Vital Signs Last 24 Hrs  T(C): 36.8 (25 Dec 2023 10:36), Max: 37 (24 Dec 2023 16:13)  T(F): 98.3 (25 Dec 2023 10:36), Max: 98.6 (24 Dec 2023 16:13)  HR: 57 (25 Dec 2023 10:36) (57 - 70)  BP: 109/62 (25 Dec 2023 10:36) (109/62 - 149/80)  BP(mean): --  RR: 18 (25 Dec 2023 10:36) (18 - 18)  SpO2: 94% (25 Dec 2023 10:36) (91% - 94%)    Parameters below as of 25 Dec 2023 10:36  Patient On (Oxygen Delivery Method): room air               General Exam:   General Appearance: Appropriately dressed and in no acute distress       Head: Normocephalic, atraumatic and no dysmorphic features  Ear, Nose, and Throat: Moist mucous membranes  CVS: S1S2+  Resp: No SOB, no wheeze or rhonchi  GI: soft NT/ND  Extremities: No edema or cyanosis  Skin: No bruises or rashes     Neurological Exam:  Mental Status: Awake, alert and oriented x 1-2  Able to follow simple  verbal commands. Able to name and repeat. fluent speech. No obvious aphasia mild dysarthria noted.   Cranial Nerves: PERRL, EOMI, VFFC, sensation V1-V3 intact,  no obvious facial asymmetry, equal elevation of palate, scm/trap 5/5, tongue is midline on protrusion. no obvious papilledema on fundoscopic exam. hearing is grossly intact.   Motor: CHAVEZ uppers 4/5 > lowers 3/5   Sensation: Intact to light touch and pinprick throughout. no right/left confusion. no extinction to tactile on DSS.   Coordination: No dysmetria on FNF   Gait: deferred     Data/Labs/Imaging which I personally reviewed.     Labs:       LABS:  no new albs       < from: CT Head No Cont (12.20.23 @ 09:22) >    ACC: [id number] EXAM:  CT BRAIN   ORDERED BY:  LARRY BROCK     PROCEDURE DATE:  12/20/2023          INTERPRETATION:  Noncontrast CT of the brain.    CLINICAL INDICATION:  Dizziness    TECHNIQUE : Axial CT scanning of the brain was obtained from the skull   base to the vertex without the administration of intravenous contrast.   Sagittal and coronal reformats were provided.    COMPARISON: CT brain 10/12/2023    FINDINGS:    Subtle nonspecific low density in the left inferior cerebellar hemisphere.    No acute intracranial hemorrhage.    Similar mild physiologic leftward midline shift. No effacement of basal   cisterns. No hydrocephalus.    Mild white matter microvascular ischemic disease.    The visualized paranasal sinuses and mastoid air cells are clear.    IMPRESSION:    Subtle nonspecific low density in the left inferior cerebellar hemisphere   could represent vasogenic edema or chronic ischemic changes.    Correlation with contrast-enhanced MRI of the brain is recommended for   further evaluation.      --- End of Report ---     < from: MR Head w/wo IV Cont (12.22.23 @ 15:51) >    ACC: [id number] EXAM:  MR BRAIN WAW IC   ORDERED BY: MICHAEL KILPATRICK     PROCEDURE DATE:  12/22/2023          INTERPRETATION:  .    CLINICAL INFORMATION: Stroke versus mass. Edema.    TECHNIQUE: Multiplanar multisequential MRI of the brain was acquired with   and without the administration of IV gadolinium. 6 cc's of IV Gadavist   was administered for the purposes of this examination. 1.5 cc's were   discarded.    COMPARISON: Prior head CT studies dated 12/22/2023 and 12/20/2023.    FINDINGS: Previously seen low-attenuation change in the left cerebellar   hemisphere has no abnormal MR correlate. No abnormal edema signal nor   enhancement is seen in this location. There is no diffusion restriction   in this area.    Multiple somewhat patchyconfluent nonspecific T2/FLAIR hyperintense   signal changes are noted throughout the bihemispheric white matter   without associated mass effect or restricted diffusion. There is no   abnormal brain parenchymal or leptomeningeal enhancement.    Ventricular size and configuration is unremarkable. No abnormal extra   axial fluid collections are seen. Flow-voids are noted throughout the   major intracranial vessels, on the T2 weighted images, consistent with   their patency. The sella turcica and posterior fossa are unremarkable.    Scattered mucosal thickening is seen throughout the paranasal sinuses.   The bilateral tympanomastoid cavities are clear. Calvarial signal appears   within normal limits. The orbits appear unremarkable apart from bilateral   cataract removal.    IMPRESSION: No acute intracranial hemorrhage, acute ischemia, or abnormal   intracranial enhancement.    Previously seen low-attenuation change within the left cerebellar   hemisphere from the prior two head CT examinations was artifactual.    Similar-appearing mild chronic white matter microvascular type changes.    --- End of Report ---            MARK DORSEY MD; Attending Radiologist  This document has been electronically signed. Dec 22 2023  8:38PM    < end of copied text >

## 2023-12-25 NOTE — PROGRESS NOTE ADULT - ASSESSMENT
84 yo female former smoker with memory impairment,  dementia, hypertension, anxiety, cataracts, hx of bladder cancer s/p surgical resection and chemo, breast cancer s/p lumpectomy, presenting with c/o dizziness and family unable to take care of her at home. Daughter  states the patient has had dizziness for over a year now and has had outpatient and inpatient workup for the dizziness.  The family is concerned the patient has dementia.  Patient lives with her  who is bedbound.  There is nobody to take care of the patient.  Patient is confused and unable to obtain full history.     CTH low desnity L cerebellum   TSH WNL   A1c 5.4     TTE done 12/21 results reveiwed   12/22 CTH uncahnged from left cerebellar hypodensity   MRI brain with and w/o obtained. no acute findings.  prior CT findings were artifactual     Imprssion:   AMS possible toxic metabolic with undelrying dementia        - mezline PRN  - b12, RPR, TSH, for reversibel causes of dementia - telemetry  - PT/OT/SS/SLP, OOBC  - check FS, glucose control <180  - GI/DVT ppx   - Thank you for allowing me to participate in the care of this patient. Call with questions.   - sw/cm for placement   no objection to d/c planning   Maynor Cartagena MD  Vascular Neurology  Office: [phone]  84 yo female former smoker with memory impairment,  dementia, hypertension, anxiety, cataracts, hx of bladder cancer s/p surgical resection and chemo, breast cancer s/p lumpectomy, presenting with c/o dizziness and family unable to take care of her at home. Daughter  states the patient has had dizziness for over a year now and has had outpatient and inpatient workup for the dizziness.  The family is concerned the patient has dementia.  Patient lives with her  who is bedbound.  There is nobody to take care of the patient.  Patient is confused and unable to obtain full history.     CTH low desnity L cerebellum   TSH WNL   A1c 5.4     TTE done 12/21 results reveiwed   12/22 CTH uncahnged from left cerebellar hypodensity   MRI brain with and w/o obtained. no acute findings.  prior CT findings were artifactual     Imprssion:   AMS possible toxic metabolic with undelrying dementia        - mezline PRN  - b12, RPR, TSH, for reversibel causes of dementia - telemetry  - PT/OT/SS/SLP, OOBC  - check FS, glucose control <180  - GI/DVT ppx   - Thank you for allowing me to participate in the care of this patient. Call with questions.   - sw/cm for placement   no objection to d/c planning   Maynor Cartagena MD  Vascular Neurology  Office: [phone]

## 2023-12-25 NOTE — PROGRESS NOTE ADULT - SUBJECTIVE AND OBJECTIVE BOX
HILTON REYES  83y Female  MRN:[id number]    Patient is a 83y old  Female who presents with a chief complaint of dizzy    HPI:  82 yo female former smoker with PMHx of memory impairment,  dementia, hypertension, anxiety, cataracts, hx of bladder cancer s/p surgical resection and chemo, breast cancer s/p lumpectomy, presenting with c/o dizziness and amily unable to take care of her at home.  Patient was brought in by EMS from her home.  Collateral was obtained from the daughter who states the patient has had dizziness for over a year now and has had outpatient and inpatient workup for the dizziness.  The family is concerned the patient has dementia.  Patient lives with her  who is bedbound.  There is nobody to take care of the patient.  Patient is confused and unable to obtain full history.  Denies any recent illnesses per daughter. (20 Dec 2023 15:17)      Patient seen and evaluated at bedside. No acute events overnight except as noted.    Interval HPI: no acute events o/n    PAST MEDICAL & SURGICAL HISTORY:  Breast cancer      Bladder cancer      H/O resection of stomach      S/P breast lumpectomy      History of bladder surgery          REVIEW OF SYSTEMS:  as per hpi     VITALS:   Vital Signs Last 24 Hrs  T(C): 36.8 (25 Dec 2023 10:36), Max: 37 (24 Dec 2023 16:13)  T(F): 98.3 (25 Dec 2023 10:36), Max: 98.6 (24 Dec 2023 16:13)  HR: 57 (25 Dec 2023 10:36) (57 - 70)  BP: 109/62 (25 Dec 2023 10:36) (109/62 - 149/80)  BP(mean): --  RR: 18 (25 Dec 2023 10:36) (18 - 18)  SpO2: 94% (25 Dec 2023 10:36) (91% - 94%)    Parameters below as of 25 Dec 2023 10:36  Patient On (Oxygen Delivery Method): room air          PHYSICAL EXAM:  GENERAL: NAD, well-developed  HEAD:  Atraumatic, Normocephalic  EYES: EOMI, PERRLA, conjunctiva and sclera clear  NECK: Supple, No JVD  CHEST/LUNG: Clear to auscultation bilaterally; No wheeze  HEART: S1, S2; No murmurs, rubs, or gallops  ABDOMEN: Soft, Nontender, Nondistended; Bowel sounds present  EXTREMITIES:  2+ Peripheral Pulses, No clubbing, cyanosis, or edema  PSYCH: Normal affect  NEUROLOGY: AAOX3; non-focal  SKIN: No rashes or lesions    Consultant(s) Notes Reviewed:  [x ] YES  [ ] NO  Care Discussed with Consultants/Other Providers [ x] YES  [ ] NO    MEDS:   MEDICATIONS  (STANDING):  clonazePAM  Tablet 0.5 milliGRAM(s) Oral two times a day  FLUoxetine 20 milliGRAM(s) Oral daily  furosemide    Tablet 20 milliGRAM(s) Oral daily  latanoprost 0.005% Ophthalmic Solution 1 Drop(s) Both EYES at bedtime  losartan 100 milliGRAM(s) Oral daily  meclizine 12.5 milliGRAM(s) Oral two times a day  metoprolol succinate  milliGRAM(s) Oral daily  multivitamin 1 Tablet(s) Oral daily  NIFEdipine XL 60 milliGRAM(s) Oral daily    MEDICATIONS  (PRN):        ALLERGIES:  No Known Allergies      LABS:                                                < from: MR Head w/wo IV Cont (12.22.23 @ 15:51) >  IMPRESSION: No acute intracranial hemorrhage, acute ischemia, or abnormal   intracranial enhancement.    Previously seen low-attenuation change within the left cerebellar   hemisphere from the prior two head CT examinations was artifactual.    Similar-appearing mild chronic white matter microvascular type changes.    --- End of Report ---        < end of copied text >         cultures: Culture Results:   No growth (12-20 @ 06:24)       < from: CT Head No Cont (12.20.23 @ 09:22) >  IMPRESSION:    Subtle nonspecific low density in the left inferior cerebellar hemisphere   could represent vasogenic edema or chronic ischemic changes.    Correlation with contrast-enhanced MRI of the brain is recommended for   further evaluation.      --- End of Report ---    < end of copied text >  < from: TTE W or WO Ultrasound Enhancing Agent (12.21.23 @ 10:04) >  ________________________________________     CONCLUSIONS:      1. Left ventricular cavity is small. Left ventricular systolic function is hyperdynamic. There are no regional wall motion abnormalities seen.    ___________________________________________    < end of copied text >   HILTON REYES  83y Female  MRN:[id number]    Patient is a 83y old  Female who presents with a chief complaint of dizzy    HPI:  82 yo female former smoker with PMHx of memory impairment,  dementia, hypertension, anxiety, cataracts, hx of bladder cancer s/p surgical resection and chemo, breast cancer s/p lumpectomy, presenting with c/o dizziness and amily unable to take care of her at home.  Patient was brought in by EMS from her home.  Collateral was obtained from the daughter who states the patient has had dizziness for over a year now and has had outpatient and inpatient workup for the dizziness.  The family is concerned the patient has dementia.  Patient lives with her  who is bedbound.  There is nobody to take care of the patient.  Patient is confused and unable to obtain full history.  Denies any recent illnesses per daughter. (20 Dec 2023 15:17)      Patient seen and evaluated at bedside. No acute events overnight except as noted.    Interval HPI: no acute events o/n    PAST MEDICAL & SURGICAL HISTORY:  Breast cancer      Bladder cancer      H/O resection of stomach      S/P breast lumpectomy      History of bladder surgery          REVIEW OF SYSTEMS:  as per hpi     VITALS:   Vital Signs Last 24 Hrs  T(C): 36.8 (25 Dec 2023 10:36), Max: 37 (24 Dec 2023 16:13)  T(F): 98.3 (25 Dec 2023 10:36), Max: 98.6 (24 Dec 2023 16:13)  HR: 57 (25 Dec 2023 10:36) (57 - 70)  BP: 109/62 (25 Dec 2023 10:36) (109/62 - 149/80)  BP(mean): --  RR: 18 (25 Dec 2023 10:36) (18 - 18)  SpO2: 94% (25 Dec 2023 10:36) (91% - 94%)    Parameters below as of 25 Dec 2023 10:36  Patient On (Oxygen Delivery Method): room air          PHYSICAL EXAM:  GENERAL: NAD, well-developed  HEAD:  Atraumatic, Normocephalic  EYES: EOMI, PERRLA, conjunctiva and sclera clear  NECK: Supple, No JVD  CHEST/LUNG: Clear to auscultation bilaterally; No wheeze  HEART: S1, S2; No murmurs, rubs, or gallops  ABDOMEN: Soft, Nontender, Nondistended; Bowel sounds present  EXTREMITIES:  2+ Peripheral Pulses, No clubbing, cyanosis, or edema  PSYCH: Normal affect  NEUROLOGY: AAOX3; non-focal  SKIN: No rashes or lesions    Consultant(s) Notes Reviewed:  [x ] YES  [ ] NO  Care Discussed with Consultants/Other Providers [ x] YES  [ ] NO    MEDS:   MEDICATIONS  (STANDING):  clonazePAM  Tablet 0.5 milliGRAM(s) Oral two times a day  FLUoxetine 20 milliGRAM(s) Oral daily  furosemide    Tablet 20 milliGRAM(s) Oral daily  latanoprost 0.005% Ophthalmic Solution 1 Drop(s) Both EYES at bedtime  losartan 100 milliGRAM(s) Oral daily  meclizine 12.5 milliGRAM(s) Oral two times a day  metoprolol succinate  milliGRAM(s) Oral daily  multivitamin 1 Tablet(s) Oral daily  NIFEdipine XL 60 milliGRAM(s) Oral daily    MEDICATIONS  (PRN):        ALLERGIES:  No Known Allergies      LABS:                                                < from: MR Head w/wo IV Cont (12.22.23 @ 15:51) >  IMPRESSION: No acute intracranial hemorrhage, acute ischemia, or abnormal   intracranial enhancement.    Previously seen low-attenuation change within the left cerebellar   hemisphere from the prior two head CT examinations was artifactual.    Similar-appearing mild chronic white matter microvascular type changes.    --- End of Report ---        < end of copied text >         cultures: Culture Results:   No growth (12-20 @ 06:24)       < from: CT Head No Cont (12.20.23 @ 09:22) >  IMPRESSION:    Subtle nonspecific low density in the left inferior cerebellar hemisphere   could represent vasogenic edema or chronic ischemic changes.    Correlation with contrast-enhanced MRI of the brain is recommended for   further evaluation.      --- End of Report ---    < end of copied text >  < from: TTE W or WO Ultrasound Enhancing Agent (12.21.23 @ 10:04) >  ________________________________________     CONCLUSIONS:      1. Left ventricular cavity is small. Left ventricular systolic function is hyperdynamic. There are no regional wall motion abnormalities seen.    ___________________________________________    < end of copied text >

## 2023-12-26 ENCOUNTER — TRANSCRIPTION ENCOUNTER (OUTPATIENT)
Age: 83
End: 2023-12-26

## 2023-12-26 VITALS
OXYGEN SATURATION: 94 % | SYSTOLIC BLOOD PRESSURE: 132 MMHG | HEART RATE: 78 BPM | DIASTOLIC BLOOD PRESSURE: 68 MMHG | RESPIRATION RATE: 18 BRPM | TEMPERATURE: 98 F

## 2023-12-26 PROCEDURE — 86780 TREPONEMA PALLIDUM: CPT

## 2023-12-26 PROCEDURE — A9585: CPT

## 2023-12-26 PROCEDURE — 97161 PT EVAL LOW COMPLEX 20 MIN: CPT

## 2023-12-26 PROCEDURE — 80061 LIPID PANEL: CPT

## 2023-12-26 PROCEDURE — 70450 CT HEAD/BRAIN W/O DYE: CPT

## 2023-12-26 PROCEDURE — 99285 EMERGENCY DEPT VISIT HI MDM: CPT | Mod: 25

## 2023-12-26 PROCEDURE — 70553 MRI BRAIN STEM W/O & W/DYE: CPT

## 2023-12-26 PROCEDURE — 82607 VITAMIN B-12: CPT

## 2023-12-26 PROCEDURE — 81001 URINALYSIS AUTO W/SCOPE: CPT

## 2023-12-26 PROCEDURE — 83036 HEMOGLOBIN GLYCOSYLATED A1C: CPT

## 2023-12-26 PROCEDURE — 93306 TTE W/DOPPLER COMPLETE: CPT

## 2023-12-26 PROCEDURE — 80048 BASIC METABOLIC PNL TOTAL CA: CPT

## 2023-12-26 PROCEDURE — 87086 URINE CULTURE/COLONY COUNT: CPT

## 2023-12-26 PROCEDURE — 85027 COMPLETE CBC AUTOMATED: CPT

## 2023-12-26 PROCEDURE — 84443 ASSAY THYROID STIM HORMONE: CPT

## 2023-12-26 PROCEDURE — 80053 COMPREHEN METABOLIC PANEL: CPT

## 2023-12-26 PROCEDURE — 36415 COLL VENOUS BLD VENIPUNCTURE: CPT

## 2023-12-26 PROCEDURE — 93005 ELECTROCARDIOGRAM TRACING: CPT

## 2023-12-26 PROCEDURE — 85025 COMPLETE CBC W/AUTO DIFF WBC: CPT

## 2023-12-26 PROCEDURE — 82746 ASSAY OF FOLIC ACID SERUM: CPT

## 2023-12-26 RX ORDER — MECLIZINE HCL 12.5 MG
1 TABLET ORAL
Qty: 60 | Refills: 0
Start: 2023-12-26 | End: 2024-01-24

## 2023-12-26 RX ORDER — SODIUM CHLORIDE 9 MG/ML
1 INJECTION INTRAMUSCULAR; INTRAVENOUS; SUBCUTANEOUS
Refills: 0 | DISCHARGE

## 2023-12-26 RX ADMIN — Medication 20 MILLIGRAM(S): at 11:18

## 2023-12-26 RX ADMIN — Medication 60 MILLIGRAM(S): at 05:56

## 2023-12-26 RX ADMIN — Medication 20 MILLIGRAM(S): at 05:56

## 2023-12-26 RX ADMIN — LOSARTAN POTASSIUM 100 MILLIGRAM(S): 100 TABLET, FILM COATED ORAL at 05:56

## 2023-12-26 RX ADMIN — Medication 200 MILLIGRAM(S): at 05:56

## 2023-12-26 RX ADMIN — PANTOPRAZOLE SODIUM 40 MILLIGRAM(S): 20 TABLET, DELAYED RELEASE ORAL at 06:02

## 2023-12-26 RX ADMIN — Medication 12.5 MILLIGRAM(S): at 05:56

## 2023-12-26 RX ADMIN — Medication 1 TABLET(S): at 11:18

## 2023-12-26 RX ADMIN — Medication 0.5 MILLIGRAM(S): at 05:56

## 2023-12-26 NOTE — DISCHARGE NOTE PROVIDER - NSDCCPCAREPLAN_GEN_ALL_CORE_FT
PRINCIPAL DISCHARGE DIAGNOSIS  Diagnosis: AMS (altered mental status)  Assessment and Plan of Treatment: patient was seen by neurology and had MRI and Ct scan with no acute changes . Please follow up with neurology outpatient and PCP      SECONDARY DISCHARGE DIAGNOSES  Diagnosis: Anxiety  Assessment and Plan of Treatment: c/w klonopin and prozac    Diagnosis: Hypertension  Assessment and Plan of Treatment: Follow up with your medical doctor to establish long term blood pressure treatment goals.      Diagnosis: Dementia  Assessment and Plan of Treatment: supportive care . please follow up with PCP

## 2023-12-26 NOTE — PROGRESS NOTE ADULT - ASSESSMENT
82 yo female h/o dementia, htn, bladder ca, breast ca, here with c/o dizziness and family unable to take care of her at home    dizziness  ongoing for years as per family  CT head noted  neuro consulted  MRI noted  meclizine    htn  cont home meds    anxiety   cont home meds    PT eval  sw eval for placement  dc planning    Advanced care planning was discussed with patient and family.  Advanced care planning forms were reviewed and discussed as appropriate.  Differential diagnosis and plan of care discussed with patient after the evaluation.   Pain assessed and judicious use of narcotics when appropriate was discussed.  Importance of Fall prevention discussed.  Counseling on Smoking and Alcohol cessation was offered when appropriate.  Counseling on Diet, exercise, and medication compliance was done.       Approx 75 minutes spent.

## 2023-12-26 NOTE — PROGRESS NOTE ADULT - SUBJECTIVE AND OBJECTIVE BOX
Neurology        S: patient seen. no neuro changes MRI neg         Medications: MEDICATIONS  (STANDING):  clonazePAM  Tablet 0.5 milliGRAM(s) Oral two times a day  FLUoxetine 20 milliGRAM(s) Oral daily  furosemide    Tablet 20 milliGRAM(s) Oral daily  latanoprost 0.005% Ophthalmic Solution 1 Drop(s) Both EYES at bedtime  losartan 100 milliGRAM(s) Oral daily  meclizine 12.5 milliGRAM(s) Oral two times a day  melatonin 5 milliGRAM(s) Oral at bedtime  metoprolol succinate  milliGRAM(s) Oral daily  multivitamin 1 Tablet(s) Oral daily  NIFEdipine XL 60 milliGRAM(s) Oral daily  pantoprazole    Tablet 40 milliGRAM(s) Oral before breakfast    MEDICATIONS  (PRN):       Vitals:  Vital Signs Last 24 Hrs  T(C): 36.5 (26 Dec 2023 15:53), Max: 36.8 (26 Dec 2023 09:28)  T(F): 97.7 (26 Dec 2023 15:53), Max: 98.2 (26 Dec 2023 09:28)  HR: 78 (26 Dec 2023 15:53) (60 - 78)  BP: 132/68 (26 Dec 2023 15:53) (127/75 - 132/68)  BP(mean): --  RR: 18 (26 Dec 2023 15:53) (18 - 18)  SpO2: 94% (26 Dec 2023 15:53) (93% - 94%)    Parameters below as of 26 Dec 2023 15:53  Patient On (Oxygen Delivery Method): room air              General Exam:   General Appearance: Appropriately dressed and in no acute distress       Head: Normocephalic, atraumatic and no dysmorphic features  Ear, Nose, and Throat: Moist mucous membranes  CVS: S1S2+  Resp: No SOB, no wheeze or rhonchi  GI: soft NT/ND  Extremities: No edema or cyanosis  Skin: No bruises or rashes     Neurological Exam:  Mental Status: Awake, alert and oriented x 1-2  Able to follow simple  verbal commands. Able to name and repeat. fluent speech. No obvious aphasia mild dysarthria noted.   Cranial Nerves: PERRL, EOMI, VFFC, sensation V1-V3 intact,  no obvious facial asymmetry, equal elevation of palate, scm/trap 5/5, tongue is midline on protrusion. no obvious papilledema on fundoscopic exam. hearing is grossly intact.   Motor: CHAVEZ uppers 4/5 > lowers 3/5   Sensation: Intact to light touch and pinprick throughout. no right/left confusion. no extinction to tactile on DSS.   Coordination: No dysmetria on FNF   Gait: deferred     Data/Labs/Imaging which I personally reviewed.     Labs:       LABS:  no new albs       < from: CT Head No Cont (12.20.23 @ 09:22) >    ACC: [id number] EXAM:  CT BRAIN   ORDERED BY:  LARRY BROCK     PROCEDURE DATE:  12/20/2023          INTERPRETATION:  Noncontrast CT of the brain.    CLINICAL INDICATION:  Dizziness    TECHNIQUE : Axial CT scanning of the brain was obtained from the skull   base to the vertex without the administration of intravenous contrast.   Sagittal and coronal reformats were provided.    COMPARISON: CT brain 10/12/2023    FINDINGS:    Subtle nonspecific low density in the left inferior cerebellar hemisphere.    No acute intracranial hemorrhage.    Similar mild physiologic leftward midline shift. No effacement of basal   cisterns. No hydrocephalus.    Mild white matter microvascular ischemic disease.    The visualized paranasal sinuses and mastoid air cells are clear.    IMPRESSION:    Subtle nonspecific low density in the left inferior cerebellar hemisphere   could represent vasogenic edema or chronic ischemic changes.    Correlation with contrast-enhanced MRI of the brain is recommended for   further evaluation.      --- End of Report ---     < from: MR Head w/wo IV Cont (12.22.23 @ 15:51) >    ACC: [id number] EXAM:  MR BRAIN WAW IC   ORDERED BY: MICHAEL KILPATRICK     PROCEDURE DATE:  12/22/2023          INTERPRETATION:  .    CLINICAL INFORMATION: Stroke versus mass. Edema.    TECHNIQUE: Multiplanar multisequential MRI of the brain was acquired with   and without the administration of IV gadolinium. 6 cc's of IV Gadavist   was administered for the purposes of this examination. 1.5 cc's were   discarded.    COMPARISON: Prior head CT studies dated 12/22/2023 and 12/20/2023.    FINDINGS: Previously seen low-attenuation change in the left cerebellar   hemisphere has no abnormal MR correlate. No abnormal edema signal nor   enhancement is seen in this location. There is no diffusion restriction   in this area.    Multiple somewhat patchyconfluent nonspecific T2/FLAIR hyperintense   signal changes are noted throughout the bihemispheric white matter   without associated mass effect or restricted diffusion. There is no   abnormal brain parenchymal or leptomeningeal enhancement.    Ventricular size and configuration is unremarkable. No abnormal extra   axial fluid collections are seen. Flow-voids are noted throughout the   major intracranial vessels, on the T2 weighted images, consistent with   their patency. The sella turcica and posterior fossa are unremarkable.    Scattered mucosal thickening is seen throughout the paranasal sinuses.   The bilateral tympanomastoid cavities are clear. Calvarial signal appears   within normal limits. The orbits appear unremarkable apart from bilateral   cataract removal.    IMPRESSION: No acute intracranial hemorrhage, acute ischemia, or abnormal   intracranial enhancement.    Previously seen low-attenuation change within the left cerebellar   hemisphere from the prior two head CT examinations was artifactual.    Similar-appearing mild chronic white matter microvascular type changes.    --- End of Report ---            MARK DORSEY MD; Attending Radiologist  This document has been electronically signed. Dec 22 2023  8:38PM    < end of copied text >   Neurology        S: patient seen. no neuro changes MRI neg         Medications: MEDICATIONS  (STANDING):  clonazePAM  Tablet 0.5 milliGRAM(s) Oral two times a day  FLUoxetine 20 milliGRAM(s) Oral daily  furosemide    Tablet 20 milliGRAM(s) Oral daily  latanoprost 0.005% Ophthalmic Solution 1 Drop(s) Both EYES at bedtime  losartan 100 milliGRAM(s) Oral daily  meclizine 12.5 milliGRAM(s) Oral two times a day  melatonin 5 milliGRAM(s) Oral at bedtime  metoprolol succinate  milliGRAM(s) Oral daily  multivitamin 1 Tablet(s) Oral daily  NIFEdipine XL 60 milliGRAM(s) Oral daily  pantoprazole    Tablet 40 milliGRAM(s) Oral before breakfast    MEDICATIONS  (PRN):       Vitals:  Vital Signs Last 24 Hrs  T(C): 36.5 (26 Dec 2023 15:53), Max: 36.8 (26 Dec 2023 09:28)  T(F): 97.7 (26 Dec 2023 15:53), Max: 98.2 (26 Dec 2023 09:28)  HR: 78 (26 Dec 2023 15:53) (60 - 78)  BP: 132/68 (26 Dec 2023 15:53) (127/75 - 132/68)  BP(mean): --  RR: 18 (26 Dec 2023 15:53) (18 - 18)  SpO2: 94% (26 Dec 2023 15:53) (93% - 94%)    Parameters below as of 26 Dec 2023 15:53  Patient On (Oxygen Delivery Method): room air              General Exam:   General Appearance: Appropriately dressed and in no acute distress       Head: Normocephalic, atraumatic and no dysmorphic features  Ear, Nose, and Throat: Moist mucous membranes  CVS: S1S2+  Resp: No SOB, no wheeze or rhonchi  GI: soft NT/ND  Extremities: No edema or cyanosis  Skin: No bruises or rashes     Neurological Exam:  Mental Status: Awake, alert and oriented x 1-2  Able to follow simple  verbal commands. Able to name and repeat. fluent speech. No obvious aphasia mild dysarthria noted.   Cranial Nerves: PERRL, EOMI, VFFC, sensation V1-V3 intact,  no obvious facial asymmetry, equal elevation of palate, scm/trap 5/5, tongue is midline on protrusion. no obvious papilledema on fundoscopic exam. hearing is grossly intact.   Motor: CHAVEZ uppers 4/5 > lowers 3/5   Sensation: Intact to light touch and pinprick throughout. no right/left confusion. no extinction to tactile on DSS.   Coordination: No dysmetria on FNF   Gait: deferred     Data/Labs/Imaging which I personally reviewed.     Labs:       LABS:  no new albs       < from: CT Head No Cont (12.20.23 @ 09:22) >    ACC: [id number] EXAM:  CT BRAIN   ORDERED BY:  LARRY BROCK     PROCEDURE DATE:  12/20/2023          INTERPRETATION:  Noncontrast CT of the brain.    CLINICAL INDICATION:  Dizziness    TECHNIQUE : Axial CT scanning of the brain was obtained from the skull   base to the vertex without the administration of intravenous contrast.   Sagittal and coronal reformats were provided.    COMPARISON: CT brain 10/12/2023    FINDINGS:    Subtle nonspecific low density in the left inferior cerebellar hemisphere.    No acute intracranial hemorrhage.    Similar mild physiologic leftward midline shift. No effacement of basal   cisterns. No hydrocephalus.    Mild white matter microvascular ischemic disease.    The visualized paranasal sinuses and mastoid air cells are clear.    IMPRESSION:    Subtle nonspecific low density in the left inferior cerebellar hemisphere   could represent vasogenic edema or chronic ischemic changes.    Correlation with contrast-enhanced MRI of the brain is recommended for   further evaluation.      --- End of Report ---     < from: MR Head w/wo IV Cont (12.22.23 @ 15:51) >    ACC: [id number] EXAM:  MR BRAIN WAW IC   ORDERED BY: MICHAEL KILPATRICK     PROCEDURE DATE:  12/22/2023          INTERPRETATION:  .    CLINICAL INFORMATION: Stroke versus mass. Edema.    TECHNIQUE: Multiplanar multisequential MRI of the brain was acquired with   and without the administration of IV gadolinium. 6 cc's of IV Gadavist   was administered for the purposes of this examination. 1.5 cc's were   discarded.    COMPARISON: Prior head CT studies dated 12/22/2023 and 12/20/2023.    FINDINGS: Previously seen low-attenuation change in the left cerebellar   hemisphere has no abnormal MR correlate. No abnormal edema signal nor   enhancement is seen in this location. There is no diffusion restriction   in this area.    Multiple somewhat patchyconfluent nonspecific T2/FLAIR hyperintense   signal changes are noted throughout the bihemispheric white matter   without associated mass effect or restricted diffusion. There is no   abnormal brain parenchymal or leptomeningeal enhancement.    Ventricular size and configuration is unremarkable. No abnormal extra   axial fluid collections are seen. Flow-voids are noted throughout the   major intracranial vessels, on the T2 weighted images, consistent with   their patency. The sella turcica and posterior fossa are unremarkable.    Scattered mucosal thickening is seen throughout the paranasal sinuses.   The bilateral tympanomastoid cavities are clear. Calvarial signal appears   within normal limits. The orbits appear unremarkable apart from bilateral   cataract removal.    IMPRESSION: No acute intracranial hemorrhage, acute ischemia, or abnormal   intracranial enhancement.    Previously seen low-attenuation change within the left cerebellar   hemisphere from the prior two head CT examinations was artifactual.    Similar-appearing mild chronic white matter microvascular type changes.    --- End of Report ---            MARK DORSEY MD; Attending Radiologist  This document has been electronically signed. Dec 22 2023  8:38PM    < end of copied text >

## 2023-12-26 NOTE — DISCHARGE NOTE PROVIDER - NSDCMRMEDTOKEN_GEN_ALL_CORE_FT
acetaminophen 325 mg oral tablet: 2 tab(s) orally every 6 hours As needed Mild Pain (1 - 3), Moderate Pain (4 - 6)  albuterol 90 mcg/inh inhalation aerosol: 2 puff(s) inhaled every 6 hours As needed Shortness of Breath and/or Wheezing  clonazePAM 0.5 mg oral tablet: 1 tab(s) orally 2 times a day  ergocalciferol 1.25 mg (50,000 intl units) oral capsule: 1 cap(s) orally once a week  FLUoxetine 20 mg oral capsule: 1 cap(s) orally once a day  furosemide 20 mg oral tablet: 1 tab(s) orally once a day  latanoprost 0.005% ophthalmic solution: 1 drop(s) to each affected eye once a day (at bedtime)  losartan 100 mg oral tablet: 1 tab(s) orally once a day  metoprolol succinate 200 mg oral tablet, extended release: 1 tab(s) orally once a day  Multiple Vitamins oral tablet: 1 tab(s) orally once a day  NIFEdipine 60 mg oral tablet, extended release: 1 tab(s) orally once a day  Sodium Chloride 1 g oral tablet: 1 tab(s) orally 3 times a day   acetaminophen 325 mg oral tablet: 2 tab(s) orally every 6 hours As needed Mild Pain (1 - 3), Moderate Pain (4 - 6)  clonazePAM 0.5 mg oral tablet: 1 tab(s) orally 2 times a day  ergocalciferol 1.25 mg (50,000 intl units) oral capsule: 1 cap(s) orally once a week  FLUoxetine 20 mg oral capsule: 1 cap(s) orally once a day  furosemide 20 mg oral tablet: 1 tab(s) orally once a day  latanoprost 0.005% ophthalmic solution: 1 drop(s) to each affected eye once a day (at bedtime)  losartan 100 mg oral tablet: 1 tab(s) orally once a day  meclizine 12.5 mg oral tablet: 1 tab(s) orally 2 times a day as needed for  dizziness  metoprolol succinate 200 mg oral tablet, extended release: 1 tab(s) orally once a day  Multiple Vitamins oral tablet: 1 tab(s) orally once a day  NIFEdipine 60 mg oral tablet, extended release: 1 tab(s) orally once a day

## 2023-12-26 NOTE — PROGRESS NOTE ADULT - SUBJECTIVE AND OBJECTIVE BOX
HILTON REYES  83y Female  MRN:[id number]    Patient is a 83y old  Female who presents with a chief complaint of dizzy    HPI:  82 yo female former smoker with PMHx of memory impairment,  dementia, hypertension, anxiety, cataracts, hx of bladder cancer s/p surgical resection and chemo, breast cancer s/p lumpectomy, presenting with c/o dizziness and amily unable to take care of her at home.  Patient was brought in by EMS from her home.  Collateral was obtained from the daughter who states the patient has had dizziness for over a year now and has had outpatient and inpatient workup for the dizziness.  The family is concerned the patient has dementia.  Patient lives with her  who is bedbound.  There is nobody to take care of the patient.  Patient is confused and unable to obtain full history.  Denies any recent illnesses per daughter. (20 Dec 2023 15:17)      Patient seen and evaluated at bedside. No acute events overnight except as noted.    Interval HPI: no acute events o/n    PAST MEDICAL & SURGICAL HISTORY:  Breast cancer      Bladder cancer      H/O resection of stomach      S/P breast lumpectomy      History of bladder surgery          REVIEW OF SYSTEMS:  as per hpi     VITALS:   Vital Signs Last 24 Hrs  T(C): 36.8 (26 Dec 2023 09:28), Max: 36.8 (26 Dec 2023 09:28)  T(F): 98.2 (26 Dec 2023 09:28), Max: 98.2 (26 Dec 2023 09:28)  HR: 60 (26 Dec 2023 09:28) (59 - 60)  BP: 127/75 (26 Dec 2023 09:28) (118/69 - 127/75)  BP(mean): --  RR: 18 (26 Dec 2023 09:28) (18 - 18)  SpO2: 93% (26 Dec 2023 09:28) (93% - 93%)    Parameters below as of 26 Dec 2023 09:28  Patient On (Oxygen Delivery Method): room air          PHYSICAL EXAM:  GENERAL: NAD, well-developed  HEAD:  Atraumatic, Normocephalic  EYES: EOMI, PERRLA, conjunctiva and sclera clear  NECK: Supple, No JVD  CHEST/LUNG: Clear to auscultation bilaterally; No wheeze  HEART: S1, S2; No murmurs, rubs, or gallops  ABDOMEN: Soft, Nontender, Nondistended; Bowel sounds present  EXTREMITIES:  2+ Peripheral Pulses, No clubbing, cyanosis, or edema  PSYCH: Normal affect  NEUROLOGY: AAOX3; non-focal  SKIN: No rashes or lesions    Consultant(s) Notes Reviewed:  [x ] YES  [ ] NO  Care Discussed with Consultants/Other Providers [ x] YES  [ ] NO    MEDS:   MEDICATIONS  (STANDING):  clonazePAM  Tablet 0.5 milliGRAM(s) Oral two times a day  FLUoxetine 20 milliGRAM(s) Oral daily  furosemide    Tablet 20 milliGRAM(s) Oral daily  latanoprost 0.005% Ophthalmic Solution 1 Drop(s) Both EYES at bedtime  losartan 100 milliGRAM(s) Oral daily  meclizine 12.5 milliGRAM(s) Oral two times a day  melatonin 5 milliGRAM(s) Oral at bedtime  metoprolol succinate  milliGRAM(s) Oral daily  multivitamin 1 Tablet(s) Oral daily  NIFEdipine XL 60 milliGRAM(s) Oral daily  pantoprazole    Tablet 40 milliGRAM(s) Oral before breakfast    MEDICATIONS  (PRN):        ALLERGIES:  No Known Allergies      LABS:                                                < from: MR Head w/wo IV Cont (12.22.23 @ 15:51) >  IMPRESSION: No acute intracranial hemorrhage, acute ischemia, or abnormal   intracranial enhancement.    Previously seen low-attenuation change within the left cerebellar   hemisphere from the prior two head CT examinations was artifactual.    Similar-appearing mild chronic white matter microvascular type changes.    --- End of Report ---        < end of copied text >         cultures: Culture Results:   No growth (12-20 @ 06:24)       < from: CT Head No Cont (12.20.23 @ 09:22) >  IMPRESSION:    Subtle nonspecific low density in the left inferior cerebellar hemisphere   could represent vasogenic edema or chronic ischemic changes.    Correlation with contrast-enhanced MRI of the brain is recommended for   further evaluation.      --- End of Report ---    < end of copied text >  < from: TTE W or WO Ultrasound Enhancing Agent (12.21.23 @ 10:04) >  ________________________________________     CONCLUSIONS:      1. Left ventricular cavity is small. Left ventricular systolic function is hyperdynamic. There are no regional wall motion abnormalities seen.    ___________________________________________    < end of copied text >   HILTON REYES  83y Female  MRN:[id number]    Patient is a 83y old  Female who presents with a chief complaint of dizzy    HPI:  82 yo female former smoker with PMHx of memory impairment,  dementia, hypertension, anxiety, cataracts, hx of bladder cancer s/p surgical resection and chemo, breast cancer s/p lumpectomy, presenting with c/o dizziness and amily unable to take care of her at home.  Patient was brought in by EMS from her home.  Collateral was obtained from the daughter who states the patient has had dizziness for over a year now and has had outpatient and inpatient workup for the dizziness.  The family is concerned the patient has dementia.  Patient lives with her  who is bedbound.  There is nobody to take care of the patient.  Patient is confused and unable to obtain full history.  Denies any recent illnesses per daughter. (20 Dec 2023 15:17)      Patient seen and evaluated at bedside. No acute events overnight except as noted.    Interval HPI: no acute events o/n    PAST MEDICAL & SURGICAL HISTORY:  Breast cancer      Bladder cancer      H/O resection of stomach      S/P breast lumpectomy      History of bladder surgery          REVIEW OF SYSTEMS:  as per hpi     VITALS:   Vital Signs Last 24 Hrs  T(C): 36.8 (26 Dec 2023 09:28), Max: 36.8 (26 Dec 2023 09:28)  T(F): 98.2 (26 Dec 2023 09:28), Max: 98.2 (26 Dec 2023 09:28)  HR: 60 (26 Dec 2023 09:28) (59 - 60)  BP: 127/75 (26 Dec 2023 09:28) (118/69 - 127/75)  BP(mean): --  RR: 18 (26 Dec 2023 09:28) (18 - 18)  SpO2: 93% (26 Dec 2023 09:28) (93% - 93%)    Parameters below as of 26 Dec 2023 09:28  Patient On (Oxygen Delivery Method): room air          PHYSICAL EXAM:  GENERAL: NAD, well-developed  HEAD:  Atraumatic, Normocephalic  EYES: EOMI, PERRLA, conjunctiva and sclera clear  NECK: Supple, No JVD  CHEST/LUNG: Clear to auscultation bilaterally; No wheeze  HEART: S1, S2; No murmurs, rubs, or gallops  ABDOMEN: Soft, Nontender, Nondistended; Bowel sounds present  EXTREMITIES:  2+ Peripheral Pulses, No clubbing, cyanosis, or edema  PSYCH: Normal affect  NEUROLOGY: AAOX3; non-focal  SKIN: No rashes or lesions    Consultant(s) Notes Reviewed:  [x ] YES  [ ] NO  Care Discussed with Consultants/Other Providers [ x] YES  [ ] NO    MEDS:   MEDICATIONS  (STANDING):  clonazePAM  Tablet 0.5 milliGRAM(s) Oral two times a day  FLUoxetine 20 milliGRAM(s) Oral daily  furosemide    Tablet 20 milliGRAM(s) Oral daily  latanoprost 0.005% Ophthalmic Solution 1 Drop(s) Both EYES at bedtime  losartan 100 milliGRAM(s) Oral daily  meclizine 12.5 milliGRAM(s) Oral two times a day  melatonin 5 milliGRAM(s) Oral at bedtime  metoprolol succinate  milliGRAM(s) Oral daily  multivitamin 1 Tablet(s) Oral daily  NIFEdipine XL 60 milliGRAM(s) Oral daily  pantoprazole    Tablet 40 milliGRAM(s) Oral before breakfast    MEDICATIONS  (PRN):        ALLERGIES:  No Known Allergies      LABS:                                                < from: MR Head w/wo IV Cont (12.22.23 @ 15:51) >  IMPRESSION: No acute intracranial hemorrhage, acute ischemia, or abnormal   intracranial enhancement.    Previously seen low-attenuation change within the left cerebellar   hemisphere from the prior two head CT examinations was artifactual.    Similar-appearing mild chronic white matter microvascular type changes.    --- End of Report ---        < end of copied text >         cultures: Culture Results:   No growth (12-20 @ 06:24)       < from: CT Head No Cont (12.20.23 @ 09:22) >  IMPRESSION:    Subtle nonspecific low density in the left inferior cerebellar hemisphere   could represent vasogenic edema or chronic ischemic changes.    Correlation with contrast-enhanced MRI of the brain is recommended for   further evaluation.      --- End of Report ---    < end of copied text >  < from: TTE W or WO Ultrasound Enhancing Agent (12.21.23 @ 10:04) >  ________________________________________     CONCLUSIONS:      1. Left ventricular cavity is small. Left ventricular systolic function is hyperdynamic. There are no regional wall motion abnormalities seen.    ___________________________________________    < end of copied text >

## 2023-12-26 NOTE — DISCHARGE NOTE PROVIDER - CARE PROVIDER_API CALL
Jonnathan Castellano. Jonnathan Castellano  Phone: (   )    -  Fax: (   )    -  Follow Up Time:     Maynor Cartagena  Neurology  [address] [facility] [address]  [place] [address]  Phone: [phone]  Fax: [phone]  Follow Up Time: 1 month   Jonnathan Castellano. Jonnathan Castellano  Phone: (   )    -  Fax: (   )    -  Follow Up Time:     Maynor Cartagena  Neurology  [address] [facility] [address]  [place] [address]  Phone: [phone]  Fax: [phone]  Follow Up Time: 1 month

## 2023-12-26 NOTE — PROGRESS NOTE ADULT - ASSESSMENT
84 yo female former smoker with memory impairment,  dementia, hypertension, anxiety, cataracts, hx of bladder cancer s/p surgical resection and chemo, breast cancer s/p lumpectomy, presenting with c/o dizziness and family unable to take care of her at home. Daughter  states the patient has had dizziness for over a year now and has had outpatient and inpatient workup for the dizziness.  The family is concerned the patient has dementia.  Patient lives with her  who is bedbound.  There is nobody to take care of the patient.  Patient is confused and unable to obtain full history.     CTH low desnity L cerebellum   TSH WNL   A1c 5.4     TTE done 12/21 results reveiwed   12/22 CTH uncahnged from left cerebellar hypodensity   MRI brain with and w/o obtained. no acute findings.  prior CT findings were artifactual     Imprssion:   AMS possible toxic metabolic with undelrying dementia        - mezline PRN  - b12, RPR, TSH, for reversibel causes of dementia - telemetry  - PT/OT/SS/SLP, OOBC  - check FS, glucose control <180  - GI/DVT ppx   - Thank you for allowing me to participate in the care of this patient. Call with questions.   - sw/cm for placement   no objection to d/c planning   Maynor Cartagena MD  Vascular Neurology  Office: [phone]  84 yo female former smoker with memory impairment,  dementia, hypertension, anxiety, cataracts, hx of bladder cancer s/p surgical resection and chemo, breast cancer s/p lumpectomy, presenting with c/o dizziness and family unable to take care of her at home. Daughter  states the patient has had dizziness for over a year now and has had outpatient and inpatient workup for the dizziness.  The family is concerned the patient has dementia.  Patient lives with her  who is bedbound.  There is nobody to take care of the patient.  Patient is confused and unable to obtain full history.     CTH low desnity L cerebellum   TSH WNL   A1c 5.4     TTE done 12/21 results reveiwed   12/22 CTH uncahnged from left cerebellar hypodensity   MRI brain with and w/o obtained. no acute findings.  prior CT findings were artifactual     Imprssion:   AMS possible toxic metabolic with undelrying dementia        - mezline PRN  - b12, RPR, TSH, for reversibel causes of dementia - telemetry  - PT/OT/SS/SLP, OOBC  - check FS, glucose control <180  - GI/DVT ppx   - Thank you for allowing me to participate in the care of this patient. Call with questions.   - sw/cm for placement   no objection to d/c planning   Maynor Cartagena MD  Vascular Neurology  Office: [phone]

## 2023-12-26 NOTE — DISCHARGE NOTE PROVIDER - PROVIDER TOKENS
FREE:[LAST:[Gray],FIRST:[Jonnathan MCDERMOTT],PHONE:[(   )    -],FAX:[(   )    -],ADDRESS:[[address]]],PROVIDER:[TOKEN:[[id number]],FOLLOWUP:[1 month]] FREE:[LAST:[Gray],FIRST:[Jonnathan MCDERMOTT],PHONE:[(   )    -],FAX:[(   )    -],ADDRESS:[[address]]],PROVIDER:[TOKEN:[[id number]],FOLLOWUP:[1 month]]

## 2023-12-26 NOTE — PROGRESS NOTE ADULT - PROVIDER SPECIALTY LIST ADULT
Internal Medicine
Neurology
Internal Medicine
Neurology
Neurology

## 2023-12-26 NOTE — DISCHARGE NOTE PROVIDER - HOSPITAL COURSE
HPI:  84 yo female former smoker with PMHx of memory impairment,  dementia, hypertension, anxiety, cataracts, hx of bladder cancer s/p surgical resection and chemo, breast cancer s/p lumpectomy, presenting with c/o dizziness and amily unable to take care of her at home.  Patient was brought in by EMS from her home.  Collateral was obtained from the daughter who states the patient has had dizziness for over a year now and has had outpatient and inpatient workup for the dizziness.  The family is concerned the patient has dementia.  Patient lives with her  who is bedbound.  There is nobody to take care of the patient.  Patient is confused and unable to obtain full history.  Denies any recent illnesses per daughter. (20 Dec 2023 15:17)    Hospital Course: Patient presented c/o dizziness and family unable to take care of her at home. Daughter  states the patient has had dizziness for over a year now and has had outpatient and inpatient workup for the dizziness.  The family is concerned the patient has dementia.  Patient lives with her  who is bedbound.  There is nobody to take care of the patient.  Patient is confused and unable to obtain full history.   Patient seen by neurology and reviewed ct head 12/22 CTH unchanged from left cerebellar hypodensity   MRI brain with and w/o obtained. no acute findings.  prior CT findings were artifactual . Patient's AMS  could be from AMS possible toxic metabolic with undelrying dementia   Patient is back to baseline and medically cleared for discharge and follow up outpatient with neurology and PCP        Important Medication Changes and Reason: none    Active or Pending Issues Requiring Follow-up: neurology and PCP    Advanced Directives:   [x ] Full code  [ ] DNR  [ ] Hospice    Discharge Diagnoses:  AMS  Dementia

## 2023-12-26 NOTE — DISCHARGE NOTE NURSING/CASE MANAGEMENT/SOCIAL WORK - PATIENT PORTAL LINK FT
You can access the FollowMyHealth Patient Portal offered by [facility] by registering at the following website: [email or web address]. By joining [facility]’s FollowMyHealth portal, you will also be able to view your health information using other applications (apps) compatible with our system. You can access the FollowMyHealth Patient Portal offered by [facility] by registering at the following website: [email or web address]. By joining [facility]’s FollowMyHealth portal, you will also be able to view your health information using other applications (apps) compatible with our system.

## 2023-12-26 NOTE — DISCHARGE NOTE PROVIDER - NSDCFUADDAPPT_GEN_ALL_CORE_FT
APPTS ARE READY TO BE MADE: [ ] YES    Best Family or Patient Contact (if needed):    Additional Information about above appointments (if needed):    1: PCP  2: neurology  3:     Other comments or requests:

## 2024-02-08 NOTE — DIETITIAN INITIAL EVALUATION ADULT - NUTRITION DIAGNOSIS
Refill Routing Note   Medication(s) are not appropriate for processing by [facility] for the following reason(s):        Required labs outdated    [facility] action(s):  Approve  Defer        Medication Therapy Plan: LOV 2/17/2023      Appointments  past 12m or future 3m with PCP    Date Provider   Last Visit   2/17/2023 Raleigh Archibald MD   Next Visit   Visit date not found Raleigh Archibald MD   ED visits in past 90 days: 0        Note composed:9:43 AM 02/08/2024           yes...

## 2024-06-27 ENCOUNTER — EMERGENCY (EMERGENCY)
Facility: HOSPITAL | Age: 84
LOS: 1 days | Discharge: HOME | End: 2024-06-27
Attending: EMERGENCY MEDICINE | Admitting: EMERGENCY MEDICINE
Payer: MEDICARE

## 2024-06-27 VITALS
RESPIRATION RATE: 18 BRPM | HEART RATE: 69 BPM | OXYGEN SATURATION: 99 % | SYSTOLIC BLOOD PRESSURE: 154 MMHG | DIASTOLIC BLOOD PRESSURE: 78 MMHG | TEMPERATURE: 98 F

## 2024-06-27 VITALS
RESPIRATION RATE: 18 BRPM | HEART RATE: 86 BPM | SYSTOLIC BLOOD PRESSURE: 139 MMHG | TEMPERATURE: 98 F | OXYGEN SATURATION: 92 % | DIASTOLIC BLOOD PRESSURE: 60 MMHG

## 2024-06-27 DIAGNOSIS — Z90.3 ACQUIRED ABSENCE OF STOMACH [PART OF]: Chronic | ICD-10-CM

## 2024-06-27 DIAGNOSIS — Z98.890 OTHER SPECIFIED POSTPROCEDURAL STATES: Chronic | ICD-10-CM

## 2024-06-27 LAB
ALBUMIN SERPL ELPH-MCNC: 4.3 G/DL — SIGNIFICANT CHANGE UP (ref 3.3–5)
ALP SERPL-CCNC: 55 U/L — SIGNIFICANT CHANGE UP (ref 40–120)
ALT FLD-CCNC: 27 U/L — SIGNIFICANT CHANGE UP (ref 4–33)
ANION GAP SERPL CALC-SCNC: 12 MMOL/L — SIGNIFICANT CHANGE UP (ref 7–14)
APPEARANCE UR: ABNORMAL
AST SERPL-CCNC: 32 U/L — SIGNIFICANT CHANGE UP (ref 4–32)
BACTERIA # UR AUTO: ABNORMAL /HPF
BASE EXCESS BLDV CALC-SCNC: -2.8 MMOL/L — LOW (ref -2–3)
BASOPHILS # BLD AUTO: 0.04 K/UL — SIGNIFICANT CHANGE UP (ref 0–0.2)
BASOPHILS NFR BLD AUTO: 0.3 % — SIGNIFICANT CHANGE UP (ref 0–2)
BILIRUB SERPL-MCNC: 0.4 MG/DL — SIGNIFICANT CHANGE UP (ref 0.2–1.2)
BILIRUB UR-MCNC: NEGATIVE — SIGNIFICANT CHANGE UP
BUN SERPL-MCNC: 15 MG/DL — SIGNIFICANT CHANGE UP (ref 7–23)
CA-I SERPL-SCNC: 1.14 MMOL/L — LOW (ref 1.15–1.33)
CALCIUM SERPL-MCNC: 9.5 MG/DL — SIGNIFICANT CHANGE UP (ref 8.4–10.5)
CAST: 1 /LPF — SIGNIFICANT CHANGE UP (ref 0–4)
CHLORIDE BLDV-SCNC: 100 MMOL/L — SIGNIFICANT CHANGE UP (ref 96–108)
CHLORIDE SERPL-SCNC: 94 MMOL/L — LOW (ref 98–107)
CO2 BLDV-SCNC: 24.6 MMOL/L — SIGNIFICANT CHANGE UP (ref 22–26)
CO2 SERPL-SCNC: 22 MMOL/L — SIGNIFICANT CHANGE UP (ref 22–31)
COLOR SPEC: YELLOW — SIGNIFICANT CHANGE UP
CREAT SERPL-MCNC: 1.12 MG/DL — SIGNIFICANT CHANGE UP (ref 0.5–1.3)
DIFF PNL FLD: ABNORMAL
EGFR: 48 ML/MIN/1.73M2 — LOW
EGFR: 48 ML/MIN/1.73M2 — LOW
EOSINOPHIL # BLD AUTO: 0.08 K/UL — SIGNIFICANT CHANGE UP (ref 0–0.5)
EOSINOPHIL NFR BLD AUTO: 0.7 % — SIGNIFICANT CHANGE UP (ref 0–6)
GAS PNL BLDV: 124 MMOL/L — LOW (ref 136–145)
GAS PNL BLDV: SIGNIFICANT CHANGE UP
GLUCOSE BLDV-MCNC: 108 MG/DL — HIGH (ref 70–99)
GLUCOSE SERPL-MCNC: 106 MG/DL — HIGH (ref 70–99)
GLUCOSE UR QL: NEGATIVE MG/DL — SIGNIFICANT CHANGE UP
HCO3 BLDV-SCNC: 23 MMOL/L — SIGNIFICANT CHANGE UP (ref 22–29)
HCT VFR BLD CALC: 42.6 % — SIGNIFICANT CHANGE UP (ref 34.5–45)
HCT VFR BLDA CALC: 38 % — SIGNIFICANT CHANGE UP (ref 34.5–46.5)
HGB BLD CALC-MCNC: 12.7 G/DL — SIGNIFICANT CHANGE UP (ref 11.7–16.1)
HGB BLD-MCNC: 15.1 G/DL — SIGNIFICANT CHANGE UP (ref 11.5–15.5)
IANC: 8.4 K/UL — HIGH (ref 1.8–7.4)
IMM GRANULOCYTES NFR BLD AUTO: 0.7 % — SIGNIFICANT CHANGE UP (ref 0–0.9)
KETONES UR-MCNC: NEGATIVE MG/DL — SIGNIFICANT CHANGE UP
LACTATE BLDV-MCNC: 1.4 MMOL/L — SIGNIFICANT CHANGE UP (ref 0.5–2)
LEUKOCYTE ESTERASE UR-ACNC: ABNORMAL
LYMPHOCYTES # BLD AUTO: 2.49 K/UL — SIGNIFICANT CHANGE UP (ref 1–3.3)
LYMPHOCYTES # BLD AUTO: 21.1 % — SIGNIFICANT CHANGE UP (ref 13–44)
MCHC RBC-ENTMCNC: 32.3 PG — SIGNIFICANT CHANGE UP (ref 27–34)
MCHC RBC-ENTMCNC: 35.4 GM/DL — SIGNIFICANT CHANGE UP (ref 32–36)
MCV RBC AUTO: 91 FL — SIGNIFICANT CHANGE UP (ref 80–100)
MONOCYTES # BLD AUTO: 0.7 K/UL — SIGNIFICANT CHANGE UP (ref 0–0.9)
MONOCYTES NFR BLD AUTO: 5.9 % — SIGNIFICANT CHANGE UP (ref 2–14)
NEUTROPHILS # BLD AUTO: 8.4 K/UL — HIGH (ref 1.8–7.4)
NEUTROPHILS NFR BLD AUTO: 71.3 % — SIGNIFICANT CHANGE UP (ref 43–77)
NITRITE UR-MCNC: NEGATIVE — SIGNIFICANT CHANGE UP
NRBC # BLD AUTO: 0 K/UL — SIGNIFICANT CHANGE UP (ref 0–0)
NRBC # BLD: 0 /100 WBCS — SIGNIFICANT CHANGE UP (ref 0–0)
NRBC # FLD: 0 K/UL — SIGNIFICANT CHANGE UP (ref 0–0)
NRBC BLD-RTO: 0 /100 WBCS — SIGNIFICANT CHANGE UP (ref 0–0)
PCO2 BLDV: 44 MMHG — SIGNIFICANT CHANGE UP (ref 39–52)
PH BLDV: 7.33 — SIGNIFICANT CHANGE UP (ref 7.32–7.43)
PH UR: 6.5 — SIGNIFICANT CHANGE UP (ref 5–8)
PLATELET # BLD AUTO: 284 K/UL — SIGNIFICANT CHANGE UP (ref 150–400)
PO2 BLDV: 48 MMHG — HIGH (ref 25–45)
POTASSIUM BLDV-SCNC: 4.9 MMOL/L — SIGNIFICANT CHANGE UP (ref 3.5–5.1)
POTASSIUM SERPL-MCNC: 5.3 MMOL/L — SIGNIFICANT CHANGE UP (ref 3.5–5.3)
POTASSIUM SERPL-SCNC: 5.3 MMOL/L — SIGNIFICANT CHANGE UP (ref 3.5–5.3)
PROT SERPL-MCNC: 8 G/DL — SIGNIFICANT CHANGE UP (ref 6–8.3)
PROT UR-MCNC: 30 MG/DL
RBC # BLD: 4.68 M/UL — SIGNIFICANT CHANGE UP (ref 3.8–5.2)
RBC # FLD: 12.7 % — SIGNIFICANT CHANGE UP (ref 10.3–14.5)
RBC CASTS # UR COMP ASSIST: 5 /HPF — SIGNIFICANT CHANGE UP (ref 0–4)
REVIEW: SIGNIFICANT CHANGE UP
SAO2 % BLDV: 81.1 % — SIGNIFICANT CHANGE UP (ref 67–88)
SODIUM SERPL-SCNC: 128 MMOL/L — LOW (ref 135–145)
SP GR SPEC: 1.01 — SIGNIFICANT CHANGE UP (ref 1–1.03)
SQUAMOUS # UR AUTO: 1 /HPF — SIGNIFICANT CHANGE UP (ref 0–5)
UROBILINOGEN FLD QL: 0.2 MG/DL — SIGNIFICANT CHANGE UP (ref 0.2–1)
WBC # BLD: 11.79 K/UL — HIGH (ref 3.8–10.5)
WBC # FLD AUTO: 11.79 K/UL — HIGH (ref 3.8–10.5)
WBC UR QL: 1128 /HPF — HIGH (ref 0–5)

## 2024-06-27 PROCEDURE — 71045 X-RAY EXAM CHEST 1 VIEW: CPT | Mod: 26

## 2024-06-27 PROCEDURE — 99285 EMERGENCY DEPT VISIT HI MDM: CPT

## 2024-06-27 RX ORDER — CEFPODOXIME PROXETIL 200 MG/1
1 TABLET, FILM COATED ORAL
Qty: 14 | Refills: 0
Start: 2024-06-27 | End: 2024-07-03

## 2024-06-27 RX ORDER — CEFTRIAXONE 500 MG/1
1000 INJECTION, POWDER, FOR SOLUTION INTRAMUSCULAR; INTRAVENOUS ONCE
Refills: 0 | Status: COMPLETED | OUTPATIENT
Start: 2024-06-27 | End: 2024-06-27

## 2024-06-27 RX ADMIN — Medication 1000 MILLILITER(S): at 14:52

## 2024-06-27 RX ADMIN — CEFTRIAXONE 100 MILLIGRAM(S): 500 INJECTION, POWDER, FOR SOLUTION INTRAMUSCULAR; INTRAVENOUS at 16:25

## 2024-06-29 LAB
CULTURE RESULTS: ABNORMAL
SPECIMEN SOURCE: SIGNIFICANT CHANGE UP

## 2024-08-07 NOTE — DISCHARGE NOTE NURSING/CASE MANAGEMENT/SOCIAL WORK - NSDCVIVACCINE_GEN_ALL_CORE_FT
Subjective   Melanie Sutton is a 35 y.o. female who presents for the following: Wart. Left foot, near heel.      Objective   Well appearing patient in no apparent distress; mood and affect are within normal limits.    A focused examination was performed including left foot. All findings within normal limits unless otherwise noted below.    Left Foot - Posterior  Verrucous plaque      Assessment/Plan   Plantar wart  Left Foot - Posterior    -Discussed nature of condition  -Multiple treatments in office are often needed  -Diligent at home therapy is often needed  -Cryotherapy today  -Possible side effects of liquid nitrogen treatment reviewed including formation of blisters, crusting, tenderness, scar, and discoloration which may be permanent.  -Patient advised to return the office for re-evaluation if the treated lesion(s) do not resolve within 4-6 weeks. Patient verbalizes understanding.    Destr of lesion - Left Foot - Posterior  Complexity: simple    Destruction method: cryotherapy    Informed consent: discussed and consent obtained    Lesion destroyed using liquid nitrogen: Yes    Cryotherapy cycles:  2  Outcome: patient tolerated procedure well with no complications    Post-procedure details: wound care instructions given        
No Vaccines Administered.
none

## 2024-10-17 PROBLEM — Z00.00 ENCOUNTER FOR PREVENTIVE HEALTH EXAMINATION: Status: ACTIVE | Noted: 2024-10-17

## 2024-10-28 ENCOUNTER — APPOINTMENT (OUTPATIENT)
Dept: HOME HEALTH SERVICES | Facility: HOME HEALTH | Age: 84
End: 2024-10-28
Payer: MEDICARE

## 2024-10-28 ENCOUNTER — APPOINTMENT (OUTPATIENT)
Dept: HOME HEALTH SERVICES | Facility: HOME HEALTH | Age: 84
End: 2024-10-28

## 2024-10-28 VITALS
SYSTOLIC BLOOD PRESSURE: 140 MMHG | TEMPERATURE: 97.4 F | HEART RATE: 72 BPM | RESPIRATION RATE: 18 BRPM | OXYGEN SATURATION: 96 % | DIASTOLIC BLOOD PRESSURE: 70 MMHG

## 2024-10-28 DIAGNOSIS — I10 ESSENTIAL (PRIMARY) HYPERTENSION: ICD-10-CM

## 2024-10-28 DIAGNOSIS — Z23 ENCOUNTER FOR IMMUNIZATION: ICD-10-CM

## 2024-10-28 DIAGNOSIS — Z86.59 PERSONAL HISTORY OF OTHER MENTAL AND BEHAVIORAL DISORDERS: ICD-10-CM

## 2024-10-28 DIAGNOSIS — F32.A DEPRESSION, UNSPECIFIED: ICD-10-CM

## 2024-10-28 DIAGNOSIS — R42 DIZZINESS AND GIDDINESS: ICD-10-CM

## 2024-10-28 DIAGNOSIS — J44.9 CHRONIC OBSTRUCTIVE PULMONARY DISEASE, UNSPECIFIED: ICD-10-CM

## 2024-10-28 DIAGNOSIS — F41.9 ANXIETY DISORDER, UNSPECIFIED: ICD-10-CM

## 2024-10-28 DIAGNOSIS — H40.9 UNSPECIFIED GLAUCOMA: ICD-10-CM

## 2024-10-28 DIAGNOSIS — F03.90 UNSPECIFIED DEMENTIA W/OUT BEHAVIORAL DISTURBANCE: ICD-10-CM

## 2024-10-28 PROCEDURE — 90662 IIV NO PRSV INCREASED AG IM: CPT

## 2024-10-28 PROCEDURE — 99344 HOME/RES VST NEW MOD MDM 60: CPT

## 2024-10-28 PROCEDURE — G0008: CPT

## 2024-10-28 RX ORDER — FLUOXETINE HYDROCHLORIDE 20 MG/1
20 CAPSULE ORAL
Refills: 0 | Status: ACTIVE | COMMUNITY
Start: 2024-10-28

## 2024-10-28 RX ORDER — LOSARTAN POTASSIUM 100 MG/1
100 TABLET, FILM COATED ORAL DAILY
Qty: 1 | Refills: 3 | Status: ACTIVE | COMMUNITY
Start: 2024-10-28

## 2024-10-28 RX ORDER — MULTIVIT-MIN/FOLIC/VIT K/LYCOP 400-300MCG
TABLET ORAL
Refills: 0 | Status: ACTIVE | COMMUNITY
Start: 2024-10-28

## 2024-10-28 RX ORDER — CLONAZEPAM 0.5 MG/1
0.5 TABLET ORAL
Qty: 30 | Refills: 0 | Status: ACTIVE | COMMUNITY
Start: 2024-10-28

## 2024-10-28 RX ORDER — NIFEDIPINE 60 MG/1
60 TABLET, EXTENDED RELEASE ORAL DAILY
Qty: 90 | Refills: 3 | Status: ACTIVE | COMMUNITY
Start: 2024-10-28

## 2024-10-28 RX ORDER — METOPROLOL SUCCINATE 200 MG/1
200 TABLET, EXTENDED RELEASE ORAL
Qty: 90 | Refills: 3 | Status: ACTIVE | COMMUNITY
Start: 2024-10-28

## 2024-10-28 RX ORDER — ERGOCALCIFEROL 1.25 MG/1
1.25 MG CAPSULE, LIQUID FILLED ORAL
Qty: 12 | Refills: 0 | Status: ACTIVE | COMMUNITY
Start: 2024-10-28

## 2024-10-28 RX ORDER — MECLIZINE HYDROCHLORIDE 12.5 MG/1
12.5 TABLET ORAL
Qty: 90 | Refills: 2 | Status: ACTIVE | COMMUNITY
Start: 2024-10-28

## 2024-10-28 RX ORDER — LATANOPROST/PF 0.005 %
0.01 DROPS OPHTHALMIC (EYE)
Qty: 1 | Refills: 2 | Status: ACTIVE | COMMUNITY
Start: 2024-10-28

## 2024-10-28 RX ORDER — FUROSEMIDE 20 MG/1
20 TABLET ORAL DAILY
Qty: 3 | Refills: 0 | Status: ACTIVE | COMMUNITY
Start: 2024-10-28

## 2024-10-28 RX ORDER — ALBUTEROL SULFATE 0.63 MG/3ML
0.63 SOLUTION RESPIRATORY (INHALATION)
Qty: 5 | Refills: 2 | Status: ACTIVE | COMMUNITY
Start: 2024-10-28

## 2024-11-17 NOTE — ED ADULT NURSE NOTE - NS PRO PASSIVE SMOKE EXP
Reviewed inpatient cardiac rehabilitation education with the patient. Phase 2 Cardiac Rehab referral will be made to [facility], [phone].  A home exercise prescription, activity restrictions & precautions, and appropriate risk factor education have been completed. All education is documented in the Cardiac Rehabilitation Education Record. Total time spent educating the patient was 30 minutes.       No

## 2024-11-25 ENCOUNTER — APPOINTMENT (OUTPATIENT)
Dept: NEUROLOGY | Facility: CLINIC | Age: 84
End: 2024-11-25
Payer: MEDICARE

## 2024-11-25 VITALS
BODY MASS INDEX: 27.48 KG/M2 | WEIGHT: 140 LBS | DIASTOLIC BLOOD PRESSURE: 86 MMHG | HEART RATE: 75 BPM | SYSTOLIC BLOOD PRESSURE: 153 MMHG | HEIGHT: 60 IN

## 2024-11-25 DIAGNOSIS — F03.90 UNSPECIFIED DEMENTIA W/OUT BEHAVIORAL DISTURBANCE: ICD-10-CM

## 2024-11-25 DIAGNOSIS — R26.81 UNSTEADINESS ON FEET: ICD-10-CM

## 2024-11-25 DIAGNOSIS — F32.A DEPRESSION, UNSPECIFIED: ICD-10-CM

## 2024-11-25 DIAGNOSIS — F41.9 ANXIETY DISORDER, UNSPECIFIED: ICD-10-CM

## 2024-11-25 DIAGNOSIS — I10 ESSENTIAL (PRIMARY) HYPERTENSION: ICD-10-CM

## 2024-11-25 DIAGNOSIS — R42 DIZZINESS AND GIDDINESS: ICD-10-CM

## 2024-11-25 PROCEDURE — 99205 OFFICE O/P NEW HI 60 MIN: CPT

## 2024-11-25 PROCEDURE — G2211 COMPLEX E/M VISIT ADD ON: CPT

## 2024-12-17 NOTE — DIETITIAN INITIAL EVALUATION ADULT - NSICDXPASTSURGICALHX_GEN_ALL_CORE_FT
denies use of recreational drugs/caffeine PAST SURGICAL HISTORY:  H/O resection of stomach     History of bladder surgery     S/P breast lumpectomy

## 2025-01-06 ENCOUNTER — INPATIENT (INPATIENT)
Facility: HOSPITAL | Age: 85
LOS: 8 days | Discharge: NURSING, REHAB OR LONG-TERM CARE | End: 2025-01-15
Attending: HOSPITALIST | Admitting: HOSPITALIST
Payer: MEDICARE

## 2025-01-06 ENCOUNTER — TRANSCRIPTION ENCOUNTER (OUTPATIENT)
Age: 85
End: 2025-01-06

## 2025-01-06 VITALS
TEMPERATURE: 98 F | SYSTOLIC BLOOD PRESSURE: 156 MMHG | HEART RATE: 74 BPM | DIASTOLIC BLOOD PRESSURE: 76 MMHG | RESPIRATION RATE: 18 BRPM | OXYGEN SATURATION: 96 %

## 2025-01-06 DIAGNOSIS — I10 ESSENTIAL (PRIMARY) HYPERTENSION: ICD-10-CM

## 2025-01-06 DIAGNOSIS — I50.32 CHRONIC DIASTOLIC (CONGESTIVE) HEART FAILURE: ICD-10-CM

## 2025-01-06 DIAGNOSIS — Z98.890 OTHER SPECIFIED POSTPROCEDURAL STATES: Chronic | ICD-10-CM

## 2025-01-06 DIAGNOSIS — R33.9 RETENTION OF URINE, UNSPECIFIED: ICD-10-CM

## 2025-01-06 DIAGNOSIS — J44.9 CHRONIC OBSTRUCTIVE PULMONARY DISEASE, UNSPECIFIED: ICD-10-CM

## 2025-01-06 DIAGNOSIS — E87.1 HYPO-OSMOLALITY AND HYPONATREMIA: ICD-10-CM

## 2025-01-06 DIAGNOSIS — N17.9 ACUTE KIDNEY FAILURE, UNSPECIFIED: ICD-10-CM

## 2025-01-06 DIAGNOSIS — Z90.3 ACQUIRED ABSENCE OF STOMACH [PART OF]: Chronic | ICD-10-CM

## 2025-01-06 LAB
ALBUMIN SERPL ELPH-MCNC: 4.5 G/DL — SIGNIFICANT CHANGE UP (ref 3.3–5)
ALP SERPL-CCNC: 60 U/L — SIGNIFICANT CHANGE UP (ref 40–120)
ALT FLD-CCNC: 28 U/L — SIGNIFICANT CHANGE UP (ref 4–33)
ANION GAP SERPL CALC-SCNC: 14 MMOL/L — SIGNIFICANT CHANGE UP (ref 7–14)
ANION GAP SERPL CALC-SCNC: 15 MMOL/L — HIGH (ref 7–14)
APPEARANCE UR: CLEAR — SIGNIFICANT CHANGE UP
AST SERPL-CCNC: 24 U/L — SIGNIFICANT CHANGE UP (ref 4–32)
BACTERIA # UR AUTO: NEGATIVE /HPF — SIGNIFICANT CHANGE UP
BASOPHILS # BLD AUTO: 0.02 K/UL — SIGNIFICANT CHANGE UP (ref 0–0.2)
BASOPHILS NFR BLD AUTO: 0.2 % — SIGNIFICANT CHANGE UP (ref 0–2)
BILIRUB SERPL-MCNC: 0.8 MG/DL — SIGNIFICANT CHANGE UP (ref 0.2–1.2)
BILIRUB UR-MCNC: NEGATIVE — SIGNIFICANT CHANGE UP
BUN SERPL-MCNC: 13 MG/DL — SIGNIFICANT CHANGE UP (ref 7–23)
BUN SERPL-MCNC: 13 MG/DL — SIGNIFICANT CHANGE UP (ref 7–23)
CALCIUM SERPL-MCNC: 8.7 MG/DL — SIGNIFICANT CHANGE UP (ref 8.4–10.5)
CALCIUM SERPL-MCNC: 9.8 MG/DL — SIGNIFICANT CHANGE UP (ref 8.4–10.5)
CAST: 0 /LPF — SIGNIFICANT CHANGE UP (ref 0–4)
CHLORIDE SERPL-SCNC: 89 MMOL/L — LOW (ref 98–107)
CHLORIDE SERPL-SCNC: 92 MMOL/L — LOW (ref 98–107)
CO2 SERPL-SCNC: 20 MMOL/L — LOW (ref 22–31)
CO2 SERPL-SCNC: 22 MMOL/L — SIGNIFICANT CHANGE UP (ref 22–31)
COLOR SPEC: YELLOW — SIGNIFICANT CHANGE UP
CREAT SERPL-MCNC: 1.58 MG/DL — HIGH (ref 0.5–1.3)
CREAT SERPL-MCNC: 1.71 MG/DL — HIGH (ref 0.5–1.3)
DIFF PNL FLD: NEGATIVE — SIGNIFICANT CHANGE UP
EGFR: 29 ML/MIN/1.73M2 — LOW
EGFR: 32 ML/MIN/1.73M2 — LOW
EOSINOPHIL # BLD AUTO: 0.08 K/UL — SIGNIFICANT CHANGE UP (ref 0–0.5)
EOSINOPHIL NFR BLD AUTO: 0.8 % — SIGNIFICANT CHANGE UP (ref 0–6)
FLUAV AG NPH QL: SIGNIFICANT CHANGE UP
FLUBV AG NPH QL: SIGNIFICANT CHANGE UP
GLUCOSE SERPL-MCNC: 103 MG/DL — HIGH (ref 70–99)
GLUCOSE SERPL-MCNC: 161 MG/DL — HIGH (ref 70–99)
GLUCOSE UR QL: NEGATIVE MG/DL — SIGNIFICANT CHANGE UP
HCT VFR BLD CALC: 41.8 % — SIGNIFICANT CHANGE UP (ref 34.5–45)
HGB BLD-MCNC: 14.8 G/DL — SIGNIFICANT CHANGE UP (ref 11.5–15.5)
IANC: 6.97 K/UL — SIGNIFICANT CHANGE UP (ref 1.8–7.4)
IMM GRANULOCYTES NFR BLD AUTO: 0.3 % — SIGNIFICANT CHANGE UP (ref 0–0.9)
KETONES UR-MCNC: NEGATIVE MG/DL — SIGNIFICANT CHANGE UP
LEUKOCYTE ESTERASE UR-ACNC: NEGATIVE — SIGNIFICANT CHANGE UP
LYMPHOCYTES # BLD AUTO: 1.97 K/UL — SIGNIFICANT CHANGE UP (ref 1–3.3)
LYMPHOCYTES # BLD AUTO: 20.4 % — SIGNIFICANT CHANGE UP (ref 13–44)
MAGNESIUM SERPL-MCNC: 2.1 MG/DL — SIGNIFICANT CHANGE UP (ref 1.6–2.6)
MCHC RBC-ENTMCNC: 32 PG — SIGNIFICANT CHANGE UP (ref 27–34)
MCHC RBC-ENTMCNC: 35.4 G/DL — SIGNIFICANT CHANGE UP (ref 32–36)
MCV RBC AUTO: 90.5 FL — SIGNIFICANT CHANGE UP (ref 80–100)
MONOCYTES # BLD AUTO: 0.61 K/UL — SIGNIFICANT CHANGE UP (ref 0–0.9)
MONOCYTES NFR BLD AUTO: 6.3 % — SIGNIFICANT CHANGE UP (ref 2–14)
NEUTROPHILS # BLD AUTO: 6.97 K/UL — SIGNIFICANT CHANGE UP (ref 1.8–7.4)
NEUTROPHILS NFR BLD AUTO: 72 % — SIGNIFICANT CHANGE UP (ref 43–77)
NITRITE UR-MCNC: NEGATIVE — SIGNIFICANT CHANGE UP
NRBC # BLD: 0 /100 WBCS — SIGNIFICANT CHANGE UP (ref 0–0)
NRBC # FLD: 0 K/UL — SIGNIFICANT CHANGE UP (ref 0–0)
PH UR: 6.5 — SIGNIFICANT CHANGE UP (ref 5–8)
PHOSPHATE SERPL-MCNC: 3 MG/DL — SIGNIFICANT CHANGE UP (ref 2.5–4.5)
PLATELET # BLD AUTO: 328 K/UL — SIGNIFICANT CHANGE UP (ref 150–400)
POTASSIUM SERPL-MCNC: 4.4 MMOL/L — SIGNIFICANT CHANGE UP (ref 3.5–5.3)
POTASSIUM SERPL-MCNC: 4.6 MMOL/L — SIGNIFICANT CHANGE UP (ref 3.5–5.3)
POTASSIUM SERPL-SCNC: 4.4 MMOL/L — SIGNIFICANT CHANGE UP (ref 3.5–5.3)
POTASSIUM SERPL-SCNC: 4.6 MMOL/L — SIGNIFICANT CHANGE UP (ref 3.5–5.3)
PROT SERPL-MCNC: 8 G/DL — SIGNIFICANT CHANGE UP (ref 6–8.3)
PROT UR-MCNC: NEGATIVE MG/DL — SIGNIFICANT CHANGE UP
RBC # BLD: 4.62 M/UL — SIGNIFICANT CHANGE UP (ref 3.8–5.2)
RBC # FLD: 11.8 % — SIGNIFICANT CHANGE UP (ref 10.3–14.5)
RBC CASTS # UR COMP ASSIST: 0 /HPF — SIGNIFICANT CHANGE UP (ref 0–4)
RSV RNA NPH QL NAA+NON-PROBE: SIGNIFICANT CHANGE UP
SARS-COV-2 RNA SPEC QL NAA+PROBE: SIGNIFICANT CHANGE UP
SODIUM SERPL-SCNC: 126 MMOL/L — LOW (ref 135–145)
SODIUM SERPL-SCNC: 126 MMOL/L — LOW (ref 135–145)
SP GR SPEC: 1.01 — SIGNIFICANT CHANGE UP (ref 1–1.03)
SQUAMOUS # UR AUTO: 0 /HPF — SIGNIFICANT CHANGE UP (ref 0–5)
TROPONIN T, HIGH SENSITIVITY RESULT: 11 NG/L — SIGNIFICANT CHANGE UP
UROBILINOGEN FLD QL: 0.2 MG/DL — SIGNIFICANT CHANGE UP (ref 0.2–1)
WBC # BLD: 9.68 K/UL — SIGNIFICANT CHANGE UP (ref 3.8–10.5)
WBC # FLD AUTO: 9.68 K/UL — SIGNIFICANT CHANGE UP (ref 3.8–10.5)
WBC UR QL: 0 /HPF — SIGNIFICANT CHANGE UP (ref 0–5)

## 2025-01-06 PROCEDURE — 99223 1ST HOSP IP/OBS HIGH 75: CPT

## 2025-01-06 PROCEDURE — 71045 X-RAY EXAM CHEST 1 VIEW: CPT | Mod: 26

## 2025-01-06 PROCEDURE — 99285 EMERGENCY DEPT VISIT HI MDM: CPT

## 2025-01-06 RX ORDER — GINKGO BILOBA 40 MG
3 CAPSULE ORAL AT BEDTIME
Refills: 0 | Status: DISCONTINUED | OUTPATIENT
Start: 2025-01-06 | End: 2025-01-15

## 2025-01-06 RX ORDER — SODIUM CHLORIDE 9 MG/ML
1000 INJECTION, SOLUTION INTRAMUSCULAR; INTRAVENOUS; SUBCUTANEOUS ONCE
Refills: 0 | Status: COMPLETED | OUTPATIENT
Start: 2025-01-06 | End: 2025-01-06

## 2025-01-06 RX ORDER — IPRATROPIUM BROMIDE AND ALBUTEROL SULFATE .5; 2.5 MG/3ML; MG/3ML
3 SOLUTION RESPIRATORY (INHALATION) EVERY 6 HOURS
Refills: 0 | Status: DISCONTINUED | OUTPATIENT
Start: 2025-01-06 | End: 2025-01-15

## 2025-01-06 RX ORDER — SODIUM CHLORIDE 9 MG/ML
1000 INJECTION, SOLUTION INTRAMUSCULAR; INTRAVENOUS; SUBCUTANEOUS
Refills: 0 | Status: DISCONTINUED | OUTPATIENT
Start: 2025-01-06 | End: 2025-01-15

## 2025-01-06 RX ORDER — ACETAMINOPHEN 80 MG/.8ML
650 SOLUTION/ DROPS ORAL EVERY 6 HOURS
Refills: 0 | Status: DISCONTINUED | OUTPATIENT
Start: 2025-01-06 | End: 2025-01-15

## 2025-01-06 RX ORDER — METHYLPREDNISOLONE 4 MG/1
125 TABLET ORAL ONCE
Refills: 0 | Status: COMPLETED | OUTPATIENT
Start: 2025-01-06 | End: 2025-01-06

## 2025-01-06 RX ORDER — ACETAMINOPHEN 80 MG/.8ML
1000 SOLUTION/ DROPS ORAL ONCE
Refills: 0 | Status: COMPLETED | OUTPATIENT
Start: 2025-01-06 | End: 2025-01-06

## 2025-01-06 RX ORDER — IPRATROPIUM BROMIDE AND ALBUTEROL SULFATE .5; 2.5 MG/3ML; MG/3ML
3 SOLUTION RESPIRATORY (INHALATION)
Refills: 0 | Status: COMPLETED | OUTPATIENT
Start: 2025-01-06 | End: 2025-01-06

## 2025-01-06 RX ORDER — CLONAZEPAM 2 MG
0.5 TABLET ORAL
Refills: 0 | Status: DISCONTINUED | OUTPATIENT
Start: 2025-01-06 | End: 2025-01-09

## 2025-01-06 RX ORDER — NIFEDIPINE 60 MG/1
60 TABLET, EXTENDED RELEASE ORAL DAILY
Refills: 0 | Status: DISCONTINUED | OUTPATIENT
Start: 2025-01-06 | End: 2025-01-15

## 2025-01-06 RX ORDER — B COMPLEX, C NO.20/FOLIC ACID 1 MG
1 CAPSULE ORAL DAILY
Refills: 0 | Status: DISCONTINUED | OUTPATIENT
Start: 2025-01-06 | End: 2025-01-15

## 2025-01-06 RX ORDER — METOPROLOL TARTRATE 50 MG
200 TABLET ORAL DAILY
Refills: 0 | Status: DISCONTINUED | OUTPATIENT
Start: 2025-01-06 | End: 2025-01-15

## 2025-01-06 RX ORDER — LATANOPROST 50 UG/ML
1 SOLUTION OPHTHALMIC AT BEDTIME
Refills: 0 | Status: DISCONTINUED | OUTPATIENT
Start: 2025-01-06 | End: 2025-01-15

## 2025-01-06 RX ORDER — MECLIZINE HYDROCHLORIDE 25 MG/1
12.5 TABLET ORAL
Refills: 0 | Status: DISCONTINUED | OUTPATIENT
Start: 2025-01-06 | End: 2025-01-15

## 2025-01-06 RX ADMIN — ACETAMINOPHEN 400 MILLIGRAM(S): 80 SOLUTION/ DROPS ORAL at 16:05

## 2025-01-06 RX ADMIN — SODIUM CHLORIDE 100 MILLILITER(S): 9 INJECTION, SOLUTION INTRAMUSCULAR; INTRAVENOUS; SUBCUTANEOUS at 23:19

## 2025-01-06 RX ADMIN — Medication 0.5 MILLIGRAM(S): at 23:10

## 2025-01-06 RX ADMIN — METHYLPREDNISOLONE 125 MILLIGRAM(S): 4 TABLET ORAL at 13:33

## 2025-01-06 RX ADMIN — SODIUM CHLORIDE 1000 MILLILITER(S): 9 INJECTION, SOLUTION INTRAMUSCULAR; INTRAVENOUS; SUBCUTANEOUS at 13:31

## 2025-01-06 RX ADMIN — IPRATROPIUM BROMIDE AND ALBUTEROL SULFATE 3 MILLILITER(S): .5; 2.5 SOLUTION RESPIRATORY (INHALATION) at 13:31

## 2025-01-06 RX ADMIN — Medication 3 MILLIGRAM(S): at 23:10

## 2025-01-06 NOTE — ED PROVIDER NOTE - CARE PLAN
1 Principal Discharge DX:	RACHAEL (acute kidney injury)  Secondary Diagnosis:	Urinary retention  Secondary Diagnosis:	Hyponatremia

## 2025-01-06 NOTE — ED ADULT NURSE NOTE - OBJECTIVE STATEMENT
Pt. received to room 11. Pt. A&Ox1 (to person) and ambulatory with a cane at baseline presenting to the ED for dysuria and SOB with ambulation. Pt. medical hx COPD, bladder CA (15 years ago), Breast CA (right limb restriction), dementia, HTN, anxiety. Pt. aide states that she has had increasing SOB with ambulation for the past day as well as having a "foul smelling pee." Pt. aide denies any at home O2 requirements. Pt. denies any recent falls. Upon assessment, respirations even and unlabored, chest rise equal bilaterally, no accessory muscle use noted. Pt. abdomen distended, soft, and nontender. Rash noted bilaterally under breasts. Left 20G AC IV placed. Pt. placed on external urinary catheter. Pt. right limb restriction band in place. Comfort measures provided, safety maintained throughout.

## 2025-01-06 NOTE — ED PROVIDER NOTE - OBJECTIVE STATEMENT
84-year-old F patient with history HTN, COPD, CHF, vertigo, panic disorder, bladder cancer s/p surgical resection chemo, breast cancer s/p mastectomy, presenting to the ED for evaluation of multiple medical complaints.  Aide at the bedside states that patient was reporting left-sided chest pain/left arm pain last night and this morning, also reporting dizziness as well as pain with urination and increased fatigue.  Patient has been eating and drinking well with normal appetite however they did notes frequent coughing/choking episodes while eating.  Aide at the bedside states that patient frequently reports feeling dizzy while ambulating.  Patient ambulates with a walker at baseline.  Denies fevers, vomiting, diarrhea, abdominal pain.

## 2025-01-06 NOTE — H&P ADULT - PROBLEM SELECTOR PLAN 4
Currently breathing comfortably without wheezing.   - will hold off on further corticosteroids  - Duoneb PRN

## 2025-01-06 NOTE — H&P ADULT - PROBLEM SELECTOR PLAN 2
unclear if obstructive uropathy vs prerenal RACHAEL 2/2 dehydration  - will trend Cr  - IV NS.  Monitor for postobstructive diuresis

## 2025-01-06 NOTE — ED ADULT NURSE REASSESSMENT NOTE - NS ED NURSE REASSESS COMMENT FT1
Pt. straight catheterized with sterile technique as per MD order, 1300mL of urine output noted. MD Melgar made aware. Comfort measures provided, safety maintained throughout.

## 2025-01-06 NOTE — ED ADULT NURSE REASSESSMENT NOTE - NS ED NURSE REASSESS COMMENT FT1
Pt. A&Ox1 and ambulatory with a cane. Pt. resting in bed, respirations even and unlabored, chest rise equal bilaterally, no accessory muscle use noted. Comfort measures provided, safety maintained throughout.

## 2025-01-06 NOTE — ED ADULT TRIAGE NOTE - RELATIONSHIP TO PATIENT
This [facility] message has been forwarded to you from a monitored pool.  Please do not reply to this message.  All responses should be sent directly to the patient.        
KAREN

## 2025-01-06 NOTE — H&P ADULT - ASSESSMENT
83 y/o F with pmh of dementia, COPD, CHF, HTN, anxiety, bladder ca s/p resection/chemo, breast cancer s/p lumpectomy, p/w multiple complaints including dizziness, chest pain, and dyspnea.  Found to have urinary retention in ED with labs showing hyponatremia.

## 2025-01-06 NOTE — H&P ADULT - NSHPLABSRESULTS_GEN_ALL_CORE
14.8   9.68  )-----------( 328      ( 2025 11:55 )             41.8     126[L]  |  92[L]  |  13  ----------------------------<  161[H]       4.4   |  20[L]  |  1.58[H]    Ca    8.7      2025 20:09  Phos  3.0       Mg     2.10         TPro  8.0  /  Alb  4.5  /  TBili  0.8  /  DBili  x   /  AST  24  /  ALT  28  /  AlkPhos  60                  hs Troponin, T - 11 ng/L (25 @ 11:55)              Urinalysis Basic - ( 2025 15:35 )  Color: Yellow / Appearance: Clear / S.007 / pH: 6.5  Gluc: Negative mg/dL / Ketone: Negative mg/dL  / Bili: Negative / Urobili: 0.2 mg/dL   Blood: Negative / Protein: Negative mg/dL / Nitrite: Negative   Leuk Esterase: Negative / RBC: 0 /HPF / WBC 0 /HPF   Sq Epi: 0 /HPF / Bacteria: Negative /HPF  Hyaline Casts: x/WBC Casts: x        CXR reviewed: Clear lungs

## 2025-01-06 NOTE — H&P ADULT - HISTORY OF PRESENT ILLNESS
83 y/o F with pmh of dementia, COPD, CHF, HTN, anxiety, bladder ca s/p resection/chemo, breast cancer s/p lumpectomy, p/w multiple complaints including dizziness, chest pain, and dyspnea.  HHA also noticed that pt's urine smelled foul.  HHA has not noticed vomiting or diarrhea.  Attempted to talk to pt via  phone, but pt states "leave me alone" and that she wants to sleep.      In the ED, pt had Na of 126 on labs.  She had urinary retention, and had aguayo catheter placed with production of 1L of urine.  She was given 1LNS, Duoneb, Solumedrol, and Tylenol.

## 2025-01-06 NOTE — ED PROVIDER NOTE - PHYSICAL EXAMINATION
Const: Awake, alert, no acute distress.  Well appearing.  Moving comfortably on stretcher.  HEENT: NC/AT.  Moist mucous membranes.  No pharyngeal erythema, no exudates.  Eyes: Extraocular movements intact b/l.  Pupils equal, round, and reactive to light b/l.  Conjunctiva pink.  No scleral icterus.  Neck: Neck supple, full ROM without pain.  Cardiac: Regular rate and regular rhythm. S1 S2 present.  Peripheral pulses 2+ and symmetric.  No LE edema.  No chest wall tenderness, no overlying skin changes.  Resp: No evidence of respiratory distress.  Good air entry b/l, breath sounds clear to auscultation b/l.  Abd: Non-distended, no overlying skin changes.  Soft, non-tender, no guarding, no rigidity, no rebound tenderness.  No palpable masses.  MSK: Spine midline and non-tender, no paraspinal tenderness, no palpable deformities or overlying skin changes or open wounds. No CVAT.  Skin: Normal coloration.  No rashes, abrasions or lacerations.  Neuro: Awake, alert & oriented x 3.  Moves all extremities spontaneously and symmetrically.  No focal deficits.

## 2025-01-06 NOTE — ED PROVIDER NOTE - ATTENDING CONTRIBUTION TO CARE
84-year-old female with a past ministry of COPD, hypertension, vertigo presents ED with multiple complaints.  Per caregiver at bedside patient is been complaining of chest pain shortness of breath dizziness dysuria which she states are chronic for her.  She has been eating and drinking without difficulty.  No reported fever chills diaphoresis.  She is alert and oriented x 3 currently at her baseline.      GENERAL: NAD, activity normal for age, well developed/ well nourished, no cyanosis, pallor, or diaphoresis.  EYES: lids/conjunctiva normal.   EARS/NOSE/THROAT: Dry MM, nares normal, lips/teeth normal uvula midline without oral pharyngeal erythema, exudate or swelling TMs normal bilaterally. No lymphangitis/lymphedema.  HEAD/NECK: normocephalic atraumatic, no facial trauma, neck is supple.  RESPIRATORY: mild wheezing throughout  CARDIAC: Regular rate and rhythm without murmurs, rubs or gallops, no peripheral edema. 2+ radial/PT and DP pulses bilaterally  ABDOMINAL: Soft, ND/NT. No evidence of fluid wave. No pulsatile masses on exam, rebound tenderness, Yepez sign or pain over Mcburney's point.  MUSCLES/EXTREMITIES: FROM in all extremities, no joint swelling or tenderness  SKIN: Warm, pink and dry. No rashes, dermatoses, petechiae or lesions.  NEUROLOGICAL: Speech is clear and appropriate. Normal level of consciousness. Gait and coordination are normal. 5/5 strength in all extremities.  PSYCH: Normal mood and affect. Judgement/competence is appropriate      MDM:  Patient presents to the ED due to multiple complaints, see above.  Vital signs are stable and afebrile.  EKG is nonischemic.  Troponin is 11 low concern for ACS    Blood work reviewed.  No anemia or leukocytosis.  She is found to have hyponatremia to 126, downtrending from 128.  She does have an RACHAEL with a creatinine 1.71 and a glucose of 103.  Patient was straight cathed with urinary retention, 1300 cc.  Urinalysis is negative.  Viral swabs are negative.  My interpretation of the chest x-ray shows no infiltrate.    given wheezing, pt treated as mild COPD exacerbation, with improvement with duoneb and steroids.     Will admit for new RACHAEL and hyponatremia.

## 2025-01-06 NOTE — ED ADULT NURSE REASSESSMENT NOTE - NS ED NURSE REASSESS COMMENT FT1
As per MD order, 14 Amharic aguayo catheter placed with sterile technique. Comfort measures provided, safety maintained throughout.

## 2025-01-06 NOTE — ED ADULT TRIAGE NOTE - CHIEF COMPLAINT QUOTE
pt coming from home, pt HHA called 911 because pt c/o dysuria and fouls smelling urine x 1 day.  Hx:  HTN, right mastectomy, glaucoma, anxiety, emphasema, bladder ca

## 2025-01-06 NOTE — H&P ADULT - NSHPPHYSICALEXAM_GEN_ALL_CORE
Vital Signs Last 24 Hrs  T(C): 36.6 (06 Jan 2025 22:47), Max: 36.7 (06 Jan 2025 13:33)  T(F): 97.8 (06 Jan 2025 22:47), Max: 98.1 (06 Jan 2025 13:33)  HR: 79 (06 Jan 2025 22:47) (73 - 81)  BP: 139/73 (06 Jan 2025 22:47) (139/73 - 156/76)  BP(mean): --  RR: 18 (06 Jan 2025 22:47) (18 - 20)  SpO2: 98% (06 Jan 2025 22:47) (96% - 100%)    Parameters below as of 06 Jan 2025 22:47  Patient On (Oxygen Delivery Method): room air        PHYSICAL EXAM:  GENERAL: No Acute Distress  EYES: conjunctiva and sclera clear  ENMT: Moist mucous membranes   NECK: Supple  PULMONARY: Clear to auscultation bilaterally  CARDIAC: Regular rate and rhythm; No murmurs, rubs, or gallops  GASTROINTESTINAL: Abdomen soft, Nontender, Nondistended; Bowel sounds normal  EXTREMITIES:   No clubbing, cyanosis, or pedal edema  PSYCH: Normal Affect, Normal Behavior  NEUROLOGY:   - Mental status A&O x 1  SKIN: No rashes or lesions  MUSCULOSKELETAL: No joint swelling

## 2025-01-06 NOTE — ED PROVIDER NOTE - PROGRESS NOTE DETAILS
Ricco RUBIO), PGY-2: Spoke with patient's daughter on the phone, states patient lives at home with  and has 30 hours/week of home care.  Daughter provided most of history as per HPI. Ricco RUBIO), PGY-2: pt straight cath'ed for urine collection, pt with 1300mL output on straight cath, will place aguayo for urinary retension.

## 2025-01-06 NOTE — ED PROVIDER NOTE - CLINICAL SUMMARY MEDICAL DECISION MAKING FREE TEXT BOX
This is an 84-year-old woman with history COPD, HTN, presenting to the ED for evaluation of multiple complaints, reported left-sided chest pain overnight, dizziness, dysuria, patient at mental baseline per aide at the bedside, patient eating and drinking normally the past few days, well-appearing in the ED, vital signs unremarkable, mild wheezing on exam however otherwise nonfocal exam, will check EKG and troponin to evaluate ACS in setting of chest pain, basic blood work, infectious screen with UA and chest x-ray viral swab to evaluate dizziness/fatigue, no indication for CT imaging at this time as no neurological deficits and patient at mental baseline, dizziness appears to be frequent for patient, DuoNebs for wheezing, follow-up results and reassess.

## 2025-01-07 ENCOUNTER — NON-APPOINTMENT (OUTPATIENT)
Age: 85
End: 2025-01-07

## 2025-01-07 DIAGNOSIS — R41.0 DISORIENTATION, UNSPECIFIED: ICD-10-CM

## 2025-01-07 LAB
ANION GAP SERPL CALC-SCNC: 13 MMOL/L — SIGNIFICANT CHANGE UP (ref 7–14)
BUN SERPL-MCNC: 13 MG/DL — SIGNIFICANT CHANGE UP (ref 7–23)
CALCIUM SERPL-MCNC: 8.9 MG/DL — SIGNIFICANT CHANGE UP (ref 8.4–10.5)
CHLORIDE SERPL-SCNC: 97 MMOL/L — LOW (ref 98–107)
CO2 SERPL-SCNC: 19 MMOL/L — LOW (ref 22–31)
CREAT SERPL-MCNC: 1.49 MG/DL — HIGH (ref 0.5–1.3)
CULTURE RESULTS: NO GROWTH — SIGNIFICANT CHANGE UP
EGFR: 34 ML/MIN/1.73M2 — LOW
GLUCOSE SERPL-MCNC: 117 MG/DL — HIGH (ref 70–99)
HCT VFR BLD CALC: 40.4 % — SIGNIFICANT CHANGE UP (ref 34.5–45)
HGB BLD-MCNC: 14.4 G/DL — SIGNIFICANT CHANGE UP (ref 11.5–15.5)
MAGNESIUM SERPL-MCNC: 2 MG/DL — SIGNIFICANT CHANGE UP (ref 1.6–2.6)
MCHC RBC-ENTMCNC: 32.4 PG — SIGNIFICANT CHANGE UP (ref 27–34)
MCHC RBC-ENTMCNC: 35.6 G/DL — SIGNIFICANT CHANGE UP (ref 32–36)
MCV RBC AUTO: 90.8 FL — SIGNIFICANT CHANGE UP (ref 80–100)
NRBC # BLD: 0 /100 WBCS — SIGNIFICANT CHANGE UP (ref 0–0)
NRBC # FLD: 0 K/UL — SIGNIFICANT CHANGE UP (ref 0–0)
OSMOLALITY SERPL: 279 MOSM/KG — SIGNIFICANT CHANGE UP (ref 275–295)
OSMOLALITY UR: 112 MOSM/KG — SIGNIFICANT CHANGE UP (ref 50–1200)
PHOSPHATE SERPL-MCNC: 2.8 MG/DL — SIGNIFICANT CHANGE UP (ref 2.5–4.5)
PLATELET # BLD AUTO: 313 K/UL — SIGNIFICANT CHANGE UP (ref 150–400)
POTASSIUM SERPL-MCNC: 4.7 MMOL/L — SIGNIFICANT CHANGE UP (ref 3.5–5.3)
POTASSIUM SERPL-SCNC: 4.7 MMOL/L — SIGNIFICANT CHANGE UP (ref 3.5–5.3)
RBC # BLD: 4.45 M/UL — SIGNIFICANT CHANGE UP (ref 3.8–5.2)
RBC # FLD: 11.9 % — SIGNIFICANT CHANGE UP (ref 10.3–14.5)
SODIUM SERPL-SCNC: 129 MMOL/L — LOW (ref 135–145)
SODIUM UR-SCNC: 29 MMOL/L — SIGNIFICANT CHANGE UP
SPECIMEN SOURCE: SIGNIFICANT CHANGE UP
WBC # BLD: 11.61 K/UL — HIGH (ref 3.8–10.5)
WBC # FLD AUTO: 11.61 K/UL — HIGH (ref 3.8–10.5)

## 2025-01-07 PROCEDURE — 99233 SBSQ HOSP IP/OBS HIGH 50: CPT

## 2025-01-07 PROCEDURE — 90792 PSYCH DIAG EVAL W/MED SRVCS: CPT

## 2025-01-07 RX ORDER — INFLUENZA A VIRUS A/WISCONSIN/588/2019 (H1N1) RECOMBINANT HEMAGGLUTININ ANTIGEN, INFLUENZA A VIRUS A/DARWIN/6/2021 (H3N2) RECOMBINANT HEMAGGLUTININ ANTIGEN, INFLUENZA B VIRUS B/AUSTRIA/1359417/2021 RECOMBINANT HEMAGGLUTININ ANTIGEN, AND INFLUENZA B VIRUS B/PHUKET/3073/2013 RECOMBINANT HEMAGGLUTININ ANTIGEN 45; 45; 45; 45 UG/.5ML; UG/.5ML; UG/.5ML; UG/.5ML
0.5 INJECTION INTRAMUSCULAR ONCE
Refills: 0 | Status: DISCONTINUED | OUTPATIENT
Start: 2025-01-07 | End: 2025-01-15

## 2025-01-07 RX ORDER — OLANZAPINE 15 MG/1
2.5 TABLET ORAL ONCE
Refills: 0 | Status: COMPLETED | OUTPATIENT
Start: 2025-01-07 | End: 2025-01-07

## 2025-01-07 RX ADMIN — Medication 0.5 MILLIGRAM(S): at 17:25

## 2025-01-07 RX ADMIN — Medication 1 TABLET(S): at 13:45

## 2025-01-07 RX ADMIN — OLANZAPINE 2.5 MILLIGRAM(S): 15 TABLET ORAL at 05:40

## 2025-01-07 RX ADMIN — NIFEDIPINE 60 MILLIGRAM(S): 60 TABLET, EXTENDED RELEASE ORAL at 07:04

## 2025-01-07 RX ADMIN — Medication 20 MILLIGRAM(S): at 13:45

## 2025-01-07 RX ADMIN — Medication 200 MILLIGRAM(S): at 10:23

## 2025-01-07 RX ADMIN — LATANOPROST 1 DROP(S): 50 SOLUTION OPHTHALMIC at 23:26

## 2025-01-07 RX ADMIN — Medication 0.5 MILLIGRAM(S): at 07:04

## 2025-01-07 NOTE — BH CONSULTATION LIAISON ASSESSMENT NOTE - NS ED BHA REVIEW OF ED CHART VITAL SIGNS REVIEWED
Subjective   History of Present Illness  The patient is a 61-year-old female who sustained a tick bite to her left posterior knee 2 days ago.  There is some redness to the area.  She did pull out the tick.  No other issues.        Review of Systems   Constitutional: Negative.  Negative for chills, fatigue and fever.   Eyes: Negative.    Respiratory: Negative for cough, chest tightness and shortness of breath.    Cardiovascular: Negative for chest pain and palpitations.   Gastrointestinal: Negative for abdominal pain, diarrhea, nausea and vomiting.   Genitourinary: Negative.    Musculoskeletal: Negative.    Skin: Positive for wound. Negative for rash.   Neurological: Negative.  Negative for syncope, weakness, numbness and headaches.   Psychiatric/Behavioral: Negative.    All other systems reviewed and are negative.      History reviewed. No pertinent past medical history.    No Known Allergies    No past surgical history on file.    History reviewed. No pertinent family history.    Social History     Socioeconomic History   • Marital status:            Objective   Physical Exam  Vitals and nursing note reviewed.   Constitutional:       General: She is not in acute distress.     Appearance: Normal appearance. She is normal weight.   HENT:      Right Ear: External ear normal.      Left Ear: External ear normal.      Nose: Nose normal.   Cardiovascular:      Rate and Rhythm: Normal rate.   Pulmonary:      Effort: Pulmonary effort is normal.   Musculoskeletal:         General: Normal range of motion.      Cervical back: Normal range of motion.      Comments: Erythema to area of tick bite.  See picture below.   Skin:     Capillary Refill: Capillary refill takes less than 2 seconds.   Neurological:      General: No focal deficit present.      Mental Status: She is alert and oriented to person, place, and time.   Psychiatric:         Mood and Affect: Mood normal.             Procedures           ED Course                                            Medical Decision Making  Patient reports tick bite to the left posterior knee.  She is covered with a one-time dose of doxycycline.    Tick bite of left knee, initial encounter: complicated acute illness or injury  Risk  Prescription drug management.          Final diagnoses:   Tick bite of left knee, initial encounter       ED Disposition  ED Disposition     ED Disposition   Discharge    Condition   Stable    Comment   --             PATIENT CONNECTION - [facility]  [facility] [id number]  [phone]  Schedule an appointment as soon as possible for a visit   To establish a Primary Care Provider         Medication List      No changes were made to your prescriptions during this visit.        Yes

## 2025-01-07 NOTE — PATIENT PROFILE ADULT - CAREGIVER NAME
as per family, pt has two caregivers at home; Simon I have discussed the discharge plan with the patient. The patient agrees with the plan, as discussed.  The patient understands Emergency Department diagnosis is a preliminary diagnosis often based on limited information and that the patient must adhere to the follow-up plan as discussed.  The patient understands that if the symptoms worsen or if prescribed medications do not have the desired/planned effect that the patient may return to the Emergency Department at any time for further evaluation and treatment.

## 2025-01-07 NOTE — BH CONSULTATION LIAISON ASSESSMENT NOTE - NSBHCONSULTFOLLOWAFTERCARE_PSY_A_CORE FT
No psychiatric c/i to d/c planing as long as behaviors under control, and a safe d/c plan is coordinated by medicine with daughter

## 2025-01-07 NOTE — BH CONSULTATION LIAISON ASSESSMENT NOTE - SUMMARY
As per records---The patient is a 85 y/o F with pmh of dementia, COPD, CHF, HTN, anxiety, bladder ca s/p resection/chemo, breast cancer s/p lumpectomy, p/w multiple complaints including dizziness, chest pain, and dyspnea.  HHA also noticed that pt's urine smelled foul.  HHA has not noticed vomiting or diarrhea.   In the ED, pt had Na of 126 on labs.  She had urinary retention, had aguayo catheter placed with production of 1L of urine. Admitted for management. Psychiatry consulted for intermittent agitation.   At baseline patient has notable STM and orientation deficits, with now worsening confusion, restlessness. Symptoms consistent with delirium at this time.     Recommendations  - No indication for a psychiatric 1:1  - Ensure that TSH, vitamin b12 level, folate level is checked. UC is pending  - Confirm home medications and doses- Fluoxetine, and Klonopin. Once confirmed, can resume  - Frequent orientation  - Avoid additional bzd, anticholinergics and antihistamines  - ONLY FOR COMBATIVE BEHAVIORS IF QTC <500, Haldol 0.5mg q 6hrs prn IM/IV/PO

## 2025-01-07 NOTE — PROGRESS NOTE ADULT - SUBJECTIVE AND OBJECTIVE BOX
Division of Hospital Medicine  Gela Abrams MD  Available via MS Teams    SUBJECTIVE / OVERNIGHT EVENTS: No active issues overnight; pt denies any new symptoms    MEDICATIONS  (STANDING):  clonazePAM  Tablet 0.5 milliGRAM(s) Oral two times a day  FLUoxetine 20 milliGRAM(s) Oral daily  influenza  Vaccine (HIGH DOSE) 0.5 milliLiter(s) IntraMuscular once  latanoprost 0.005% Ophthalmic Solution 1 Drop(s) Both EYES at bedtime  metoprolol succinate  milliGRAM(s) Oral daily  multivitamin 1 Tablet(s) Oral daily  NIFEdipine XL 60 milliGRAM(s) Oral daily  sodium chloride 0.9%. 1000 milliLiter(s) (100 mL/Hr) IV Continuous <Continuous>    MEDICATIONS  (PRN):  acetaminophen     Tablet .. 650 milliGRAM(s) Oral every 6 hours PRN Temp greater or equal to 38C (100.4F), Mild Pain (1 - 3)  albuterol/ipratropium for Nebulization 3 milliLiter(s) Nebulizer every 6 hours PRN Shortness of Breath and/or Wheezing  meclizine 12.5 milliGRAM(s) Oral two times a day PRN for dizziness  melatonin 3 milliGRAM(s) Oral at bedtime PRN Insomnia      I&O's Summary    06 Jan 2025 07:01  -  07 Jan 2025 07:00  --------------------------------------------------------  IN: 200 mL / OUT: 400 mL / NET: -200 mL    07 Jan 2025 07:01  -  07 Jan 2025 20:25  --------------------------------------------------------  IN: 480 mL / OUT: 1750 mL / NET: -1270 mL        PHYSICAL EXAM:  Vital Signs Last 24 Hrs  T(C): 36.7 (07 Jan 2025 17:25), Max: 36.8 (07 Jan 2025 04:33)  T(F): 98.1 (07 Jan 2025 17:25), Max: 98.2 (07 Jan 2025 04:33)  HR: 74 (07 Jan 2025 17:25) (73 - 90)  BP: 151/69 (07 Jan 2025 17:25) (121/80 - 167/88)  BP(mean): --  RR: 17 (07 Jan 2025 17:25) (17 - 18)  SpO2: 99% (07 Jan 2025 17:25) (97% - 99%)    Parameters below as of 07 Jan 2025 17:25  Patient On (Oxygen Delivery Method): room air      CONSTITUTIONAL: NAD  EYES: PERRLA; conjunctiva and sclera clear  ENMT: Moist oral mucosa, no pharyngeal injection or exudates; normal dentition  NECK: Supple, no palpable masses; no thyromegaly  RESPIRATORY: Normal respiratory effort; lungs are clear to auscultation bilaterally; no rales, rhonchi, or wheezing  CARDIOVASCULAR: Regular rate and rhythm, normal S1 and S2, no murmur/rub/gallop; Peripheral pulses are 2+ bilaterally  ABDOMEN: Nontender to palpation, normoactive bowel sounds, no rebound/guarding; No hepatosplenomegaly  MUSCULOSKELETAL: no clubbing or cyanosis of digits  NEUROLOGY: Awake and alert to person only  SKIN: No rashes; no palpable lesions    LABS:                        14.4   11.61 )-----------( 313      ( 07 Jan 2025 09:24 )             40.4     01-07    129[L]  |  97[L]  |  13  ----------------------------<  117[H]  4.7   |  19[L]  |  1.49[H]    Ca    8.9      07 Jan 2025 09:24  Phos  2.8     01-07  Mg     2.00     01-07    TPro  8.0  /  Alb  4.5  /  TBili  0.8  /  DBili  x   /  AST  24  /  ALT  28  /  AlkPhos  60  01-06          Urinalysis Basic - ( 07 Jan 2025 09:24 )    Color: x / Appearance: x / SG: x / pH: x  Gluc: 117 mg/dL / Ketone: x  / Bili: x / Urobili: x   Blood: x / Protein: x / Nitrite: x   Leuk Esterase: x / RBC: x / WBC x   Sq Epi: x / Non Sq Epi: x / Bacteria: x        Culture - Urine (collected 06 Jan 2025 17:02)  Source: Clean Catch Clean Catch (Midstream)  Final Report (07 Jan 2025 15:17):    No growth          Imaging and consultant notes reviewed.

## 2025-01-07 NOTE — PATIENT PROFILE ADULT - FALL HARM RISK - HARM RISK INTERVENTIONS
Assistance with ambulation/Assistance OOB with selected safe patient handling equipment/Communicate Risk of Fall with Harm to all staff/Discuss with provider need for PT consult/Monitor for mental status changes/Monitor gait and stability/Move patient closer to nurses' station/Provide patient with walking aids - walker, cane, crutches/Reinforce activity limits and safety measures with patient and family/Reorient to person, place and time as needed/Tailored Fall Risk Interventions/Toileting schedule using arm’s reach rule for commode and bathroom/Use of alarms - bed, chair and/or voice tab/Visual Cue: Yellow wristband and red socks/Bed in lowest position, wheels locked, appropriate side rails in place/Call bell, personal items and telephone in reach/Instruct patient to call for assistance before getting out of bed or chair/Non-slip footwear when patient is out of bed/[place] to call system/Physically safe environment - no spills, clutter or unnecessary equipment/Purposeful Proactive Rounding/Room/bathroom lighting operational, light cord in reach

## 2025-01-07 NOTE — BH CONSULTATION LIAISON ASSESSMENT NOTE - HPI (INCLUDE ILLNESS QUALITY, SEVERITY, DURATION, TIMING, CONTEXT, MODIFYING FACTORS, ASSOCIATED SIGNS AND SYMPTOMS)
As per records---The patient is a 85 y/o F with pmh of dementia, COPD, CHF, HTN, anxiety, bladder ca s/p resection/chemo, breast cancer s/p lumpectomy, p/w multiple complaints including dizziness, chest pain, and dyspnea.  HHA also noticed that pt's urine smelled foul.  HHA has not noticed vomiting or diarrhea.   In the ED, pt had Na of 126 on labs.  She had urinary retention, had aguayo catheter placed with production of 1L of urine. Admitted for management. Psychiatry consulted for intermittent agitation    Met with the patient. Refuses . Daughter at bedside who is comforting and reorienting patient. Patient perseverates on - ' I want to pee'. Can be restless but is not agitated or aggressive.   Daughter states that patient is more confused at this time, and states that at baseline patient is mostly bed bound, is disoriented, with notable STM deficits. She lives at home with , and get a few hours of HHA services. Can ambulate to bathroom and back to bed. Is continent, and independent in terms of feeding. History of trauma dating back to childhood, has intermittent anxiety symptoms but no formal psychiatric history. Daughter confirms that patient is on Fluoxetine and Klonopin for about a year. No behavioral issues.

## 2025-01-07 NOTE — BH CONSULTATION LIAISON ASSESSMENT NOTE - CURRENT MEDICATION
MEDICATIONS  (STANDING):  clonazePAM  Tablet 0.5 milliGRAM(s) Oral two times a day  FLUoxetine 20 milliGRAM(s) Oral daily  latanoprost 0.005% Ophthalmic Solution 1 Drop(s) Both EYES at bedtime  metoprolol succinate  milliGRAM(s) Oral daily  multivitamin 1 Tablet(s) Oral daily  NIFEdipine XL 60 milliGRAM(s) Oral daily  sodium chloride 0.9%. 1000 milliLiter(s) (100 mL/Hr) IV Continuous <Continuous>    MEDICATIONS  (PRN):  acetaminophen     Tablet .. 650 milliGRAM(s) Oral every 6 hours PRN Temp greater or equal to 38C (100.4F), Mild Pain (1 - 3)  albuterol/ipratropium for Nebulization 3 milliLiter(s) Nebulizer every 6 hours PRN Shortness of Breath and/or Wheezing  meclizine 12.5 milliGRAM(s) Oral two times a day PRN for dizziness  melatonin 3 milliGRAM(s) Oral at bedtime PRN Insomnia

## 2025-01-07 NOTE — BH CONSULTATION LIAISON ASSESSMENT NOTE - NSBHCHARTREVIEWVS_PSY_A_CORE FT
Vital Signs Last 24 Hrs  T(C): 36.6 (07 Jan 2025 10:00), Max: 36.8 (07 Jan 2025 04:33)  T(F): 97.9 (07 Jan 2025 10:00), Max: 98.2 (07 Jan 2025 04:33)  HR: 73 (07 Jan 2025 10:00) (73 - 90)  BP: 121/80 (07 Jan 2025 10:00) (121/80 - 167/88)  BP(mean): --  RR: 18 (07 Jan 2025 10:00) (17 - 18)  SpO2: 99% (07 Jan 2025 10:00) (97% - 100%)    Parameters below as of 07 Jan 2025 10:00  Patient On (Oxygen Delivery Method): room air

## 2025-01-07 NOTE — PATIENT PROFILE ADULT - FUNCTIONAL SCREEN CURRENT LEVEL: COMMUNICATION, MLM
pt confused at baseline; has h/o dementia/2 = difficulty understanding (not related to language barrier)

## 2025-01-08 LAB
ADD ON TEST-SPECIMEN IN LAB: SIGNIFICANT CHANGE UP
ANION GAP SERPL CALC-SCNC: 14 MMOL/L — SIGNIFICANT CHANGE UP (ref 7–14)
BUN SERPL-MCNC: 15 MG/DL — SIGNIFICANT CHANGE UP (ref 7–23)
CALCIUM SERPL-MCNC: 9.1 MG/DL — SIGNIFICANT CHANGE UP (ref 8.4–10.5)
CHLORIDE SERPL-SCNC: 102 MMOL/L — SIGNIFICANT CHANGE UP (ref 98–107)
CO2 SERPL-SCNC: 21 MMOL/L — LOW (ref 22–31)
CREAT SERPL-MCNC: 1.64 MG/DL — HIGH (ref 0.5–1.3)
EGFR: 30 ML/MIN/1.73M2 — LOW
FOLATE SERPL-MCNC: 10.3 NG/ML — SIGNIFICANT CHANGE UP (ref 3.1–17.5)
GLUCOSE SERPL-MCNC: 94 MG/DL — SIGNIFICANT CHANGE UP (ref 70–99)
HCT VFR BLD CALC: 38.5 % — SIGNIFICANT CHANGE UP (ref 34.5–45)
HGB BLD-MCNC: 13.6 G/DL — SIGNIFICANT CHANGE UP (ref 11.5–15.5)
MCHC RBC-ENTMCNC: 32.6 PG — SIGNIFICANT CHANGE UP (ref 27–34)
MCHC RBC-ENTMCNC: 35.3 G/DL — SIGNIFICANT CHANGE UP (ref 32–36)
MCV RBC AUTO: 92.3 FL — SIGNIFICANT CHANGE UP (ref 80–100)
NRBC # BLD: 0 /100 WBCS — SIGNIFICANT CHANGE UP (ref 0–0)
NRBC # FLD: 0 K/UL — SIGNIFICANT CHANGE UP (ref 0–0)
PLATELET # BLD AUTO: 295 K/UL — SIGNIFICANT CHANGE UP (ref 150–400)
POTASSIUM SERPL-MCNC: 4.3 MMOL/L — SIGNIFICANT CHANGE UP (ref 3.5–5.3)
POTASSIUM SERPL-SCNC: 4.3 MMOL/L — SIGNIFICANT CHANGE UP (ref 3.5–5.3)
RBC # BLD: 4.17 M/UL — SIGNIFICANT CHANGE UP (ref 3.8–5.2)
RBC # FLD: 12.3 % — SIGNIFICANT CHANGE UP (ref 10.3–14.5)
SODIUM SERPL-SCNC: 137 MMOL/L — SIGNIFICANT CHANGE UP (ref 135–145)
TSH SERPL-MCNC: 1.19 UIU/ML — SIGNIFICANT CHANGE UP (ref 0.27–4.2)
VIT B12 SERPL-MCNC: 848 PG/ML — SIGNIFICANT CHANGE UP (ref 200–900)
WBC # BLD: 8.98 K/UL — SIGNIFICANT CHANGE UP (ref 3.8–10.5)
WBC # FLD AUTO: 8.98 K/UL — SIGNIFICANT CHANGE UP (ref 3.8–10.5)

## 2025-01-08 PROCEDURE — 99233 SBSQ HOSP IP/OBS HIGH 50: CPT

## 2025-01-08 PROCEDURE — 76770 US EXAM ABDO BACK WALL COMP: CPT | Mod: 26

## 2025-01-08 RX ADMIN — Medication 20 MILLIGRAM(S): at 14:21

## 2025-01-08 RX ADMIN — LATANOPROST 1 DROP(S): 50 SOLUTION OPHTHALMIC at 21:41

## 2025-01-08 RX ADMIN — Medication 1 TABLET(S): at 14:24

## 2025-01-08 RX ADMIN — Medication 200 MILLIGRAM(S): at 14:21

## 2025-01-08 RX ADMIN — NIFEDIPINE 60 MILLIGRAM(S): 60 TABLET, EXTENDED RELEASE ORAL at 09:42

## 2025-01-08 RX ADMIN — Medication 0.5 MILLIGRAM(S): at 18:24

## 2025-01-08 RX ADMIN — Medication 0.5 MILLIGRAM(S): at 09:34

## 2025-01-08 NOTE — PROGRESS NOTE ADULT - SUBJECTIVE AND OBJECTIVE BOX
Division of Hospital Medicine  Gela Abrams MD  Available via MS Teams    SUBJECTIVE / OVERNIGHT EVENTS: No active issues overnight; pt denies any new symptoms    MEDICATIONS  (STANDING):  clonazePAM  Tablet 0.5 milliGRAM(s) Oral two times a day  FLUoxetine 20 milliGRAM(s) Oral daily  influenza  Vaccine (HIGH DOSE) 0.5 milliLiter(s) IntraMuscular once  latanoprost 0.005% Ophthalmic Solution 1 Drop(s) Both EYES at bedtime  metoprolol succinate  milliGRAM(s) Oral daily  multivitamin 1 Tablet(s) Oral daily  NIFEdipine XL 60 milliGRAM(s) Oral daily  sodium chloride 0.9%. 1000 milliLiter(s) (100 mL/Hr) IV Continuous <Continuous>    MEDICATIONS  (PRN):  acetaminophen     Tablet .. 650 milliGRAM(s) Oral every 6 hours PRN Temp greater or equal to 38C (100.4F), Mild Pain (1 - 3)  albuterol/ipratropium for Nebulization 3 milliLiter(s) Nebulizer every 6 hours PRN Shortness of Breath and/or Wheezing  meclizine 12.5 milliGRAM(s) Oral two times a day PRN for dizziness  melatonin 3 milliGRAM(s) Oral at bedtime PRN Insomnia      I&O's Summary    07 Jan 2025 07:01  -  08 Jan 2025 07:00  --------------------------------------------------------  IN: 480 mL / OUT: 1750 mL / NET: -1270 mL        PHYSICAL EXAM:  Vital Signs Last 24 Hrs  T(C): 36.8 (08 Jan 2025 09:30), Max: 36.8 (08 Jan 2025 09:30)  T(F): 98.2 (08 Jan 2025 09:30), Max: 98.2 (08 Jan 2025 09:30)  HR: 80 (08 Jan 2025 09:30) (73 - 80)  BP: 151/93 (08 Jan 2025 09:30) (140/54 - 152/63)  BP(mean): --  RR: 17 (08 Jan 2025 09:30) (17 - 18)  SpO2: 95% (08 Jan 2025 09:30) (95% - 99%)    Parameters below as of 08 Jan 2025 09:30  Patient On (Oxygen Delivery Method): room air      CONSTITUTIONAL: NAD  EYES: PERRLA; conjunctiva and sclera clear  ENMT: Moist oral mucosa, no pharyngeal injection or exudates; normal dentition  NECK: Supple, no palpable masses; no thyromegaly  RESPIRATORY: Normal respiratory effort; lungs are clear to auscultation bilaterally; no rales, rhonchi, or wheezing  CARDIOVASCULAR: Regular rate and rhythm, normal S1 and S2, no murmur/rub/gallop; Peripheral pulses are 2+ bilaterally  ABDOMEN: Nontender to palpation, normoactive bowel sounds, no rebound/guarding; No hepatosplenomegaly  MUSCULOSKELETAL: no clubbing or cyanosis of digits  NEUROLOGY: Awake and alert to person; no focal neurological deficits   SKIN: No rashes; no palpable lesions    LABS:                        13.6   8.98  )-----------( 295      ( 08 Jan 2025 09:35 )             38.5     01-08    137  |  102  |  15  ----------------------------<  94  4.3   |  21[L]  |  1.64[H]    Ca    9.1      08 Jan 2025 09:35  Phos  2.8     01-07  Mg     2.00     01-07            Urinalysis Basic - ( 08 Jan 2025 09:35 )    Color: x / Appearance: x / SG: x / pH: x  Gluc: 94 mg/dL / Ketone: x  / Bili: x / Urobili: x   Blood: x / Protein: x / Nitrite: x   Leuk Esterase: x / RBC: x / WBC x   Sq Epi: x / Non Sq Epi: x / Bacteria: x        Culture - Urine (collected 06 Jan 2025 17:02)  Source: Clean Catch Clean Catch (Midstream)  Final Report (07 Jan 2025 15:17):    No growth          Imaging and consultant notes reviewed.

## 2025-01-09 DIAGNOSIS — Z29.9 ENCOUNTER FOR PROPHYLACTIC MEASURES, UNSPECIFIED: ICD-10-CM

## 2025-01-09 LAB
ANION GAP SERPL CALC-SCNC: 11 MMOL/L — SIGNIFICANT CHANGE UP (ref 7–14)
BUN SERPL-MCNC: 15 MG/DL — SIGNIFICANT CHANGE UP (ref 7–23)
CALCIUM SERPL-MCNC: 9.6 MG/DL — SIGNIFICANT CHANGE UP (ref 8.4–10.5)
CHLORIDE SERPL-SCNC: 101 MMOL/L — SIGNIFICANT CHANGE UP (ref 98–107)
CO2 SERPL-SCNC: 23 MMOL/L — SIGNIFICANT CHANGE UP (ref 22–31)
CREAT SERPL-MCNC: 1.58 MG/DL — HIGH (ref 0.5–1.3)
EGFR: 32 ML/MIN/1.73M2 — LOW
GLUCOSE SERPL-MCNC: 102 MG/DL — HIGH (ref 70–99)
MAGNESIUM SERPL-MCNC: 2.2 MG/DL — SIGNIFICANT CHANGE UP (ref 1.6–2.6)
MRSA PCR RESULT.: SIGNIFICANT CHANGE UP
PHOSPHATE SERPL-MCNC: 2.6 MG/DL — SIGNIFICANT CHANGE UP (ref 2.5–4.5)
POTASSIUM SERPL-MCNC: 4.8 MMOL/L — SIGNIFICANT CHANGE UP (ref 3.5–5.3)
POTASSIUM SERPL-SCNC: 4.8 MMOL/L — SIGNIFICANT CHANGE UP (ref 3.5–5.3)
S AUREUS DNA NOSE QL NAA+PROBE: SIGNIFICANT CHANGE UP
SODIUM SERPL-SCNC: 135 MMOL/L — SIGNIFICANT CHANGE UP (ref 135–145)
TSH SERPL-MCNC: 1.37 UIU/ML — SIGNIFICANT CHANGE UP (ref 0.27–4.2)

## 2025-01-09 PROCEDURE — 99233 SBSQ HOSP IP/OBS HIGH 50: CPT

## 2025-01-09 RX ORDER — CHLORHEXIDINE GLUCONATE 1.2 MG/ML
1 RINSE ORAL DAILY
Refills: 0 | Status: DISCONTINUED | OUTPATIENT
Start: 2025-01-09 | End: 2025-01-15

## 2025-01-09 RX ORDER — CLONAZEPAM 2 MG
0.5 TABLET ORAL
Refills: 0 | Status: DISCONTINUED | OUTPATIENT
Start: 2025-01-09 | End: 2025-01-15

## 2025-01-09 RX ORDER — HEPARIN SODIUM 1000 [USP'U]/ML
5000 INJECTION, SOLUTION INTRAVENOUS; SUBCUTANEOUS EVERY 8 HOURS
Refills: 0 | Status: DISCONTINUED | OUTPATIENT
Start: 2025-01-09 | End: 2025-01-15

## 2025-01-09 RX ADMIN — Medication 200 MILLIGRAM(S): at 05:28

## 2025-01-09 RX ADMIN — Medication 20 MILLIGRAM(S): at 11:42

## 2025-01-09 RX ADMIN — CHLORHEXIDINE GLUCONATE 1 APPLICATION(S): 1.2 RINSE ORAL at 11:58

## 2025-01-09 RX ADMIN — Medication 0.5 MILLIGRAM(S): at 18:00

## 2025-01-09 RX ADMIN — NIFEDIPINE 60 MILLIGRAM(S): 60 TABLET, EXTENDED RELEASE ORAL at 05:31

## 2025-01-09 RX ADMIN — Medication 0.5 MILLIGRAM(S): at 05:28

## 2025-01-09 RX ADMIN — LATANOPROST 1 DROP(S): 50 SOLUTION OPHTHALMIC at 21:45

## 2025-01-09 RX ADMIN — HEPARIN SODIUM 5000 UNIT(S): 1000 INJECTION, SOLUTION INTRAVENOUS; SUBCUTANEOUS at 21:55

## 2025-01-09 RX ADMIN — Medication 1 TABLET(S): at 11:43

## 2025-01-09 NOTE — SWALLOW BEDSIDE ASSESSMENT ADULT - COMMENTS
Hospitalist 1/8, "85 y/o F with pmh of dementia, COPD, CHF, HTN, anxiety, bladder ca s/p resection/chemo, breast cancer s/p lumpectomy, p/w multiple complaints including dizziness, chest pain, and dyspnea.  Found to have urinary retention in ED with labs showing hyponatremia."    CXR 1/6: IMPRESSION: No active disease    Patient received lying in bed, awake/ alert, able to make basic wants/ needs known and follow simple directives in English; however, primary language [place] with daughter being present at bedside and providing translation throughout. Daughter initially indicated that patient does not have any difficulty with consuming regular solids despite edentulous state; however, later upon questioning during PO trials indicated patient swallows soft solids and tolerating current diet order of soft and bite-sized. Daughter reported patient with occasionally "choke" (clarified cough) when drinking liquids at home. Further at the conclusion of the assessment daughter reported that the patient indicated "she does say sometimes that she feels like there is something there always (pointing to throat)." Patient verbalized being hungry; however, required ongoing verbal cues from SLP/ daughter to participate in PO trials.

## 2025-01-09 NOTE — SWALLOW BEDSIDE ASSESSMENT ADULT - CONSISTENCIES ADMINISTERED
3 tsp trials (required encouragement from SLP to take). SLP offered yoghurt/ pudding as well which were declined/pureed 4 oz/thin liquid 2 tsp trials --declined additional despite encouragement from SLP/soft & bite-sized ~2 oz/mildly thick

## 2025-01-09 NOTE — PROGRESS NOTE ADULT - SUBJECTIVE AND OBJECTIVE BOX
Division of Hospital Medicine  Gela Abrams MD  Available via MS Teams    SUBJECTIVE / OVERNIGHT EVENTS: No active issues overnight; pt denies any new symptoms    MEDICATIONS  (STANDING):  chlorhexidine 2% Cloths 1 Application(s) Topical daily  clonazePAM  Tablet 0.5 milliGRAM(s) Oral two times a day  FLUoxetine 20 milliGRAM(s) Oral daily  influenza  Vaccine (HIGH DOSE) 0.5 milliLiter(s) IntraMuscular once  latanoprost 0.005% Ophthalmic Solution 1 Drop(s) Both EYES at bedtime  metoprolol succinate  milliGRAM(s) Oral daily  multivitamin 1 Tablet(s) Oral daily  NIFEdipine XL 60 milliGRAM(s) Oral daily  sodium chloride 0.9%. 1000 milliLiter(s) (100 mL/Hr) IV Continuous <Continuous>    MEDICATIONS  (PRN):  acetaminophen     Tablet .. 650 milliGRAM(s) Oral every 6 hours PRN Temp greater or equal to 38C (100.4F), Mild Pain (1 - 3)  albuterol/ipratropium for Nebulization 3 milliLiter(s) Nebulizer every 6 hours PRN Shortness of Breath and/or Wheezing  meclizine 12.5 milliGRAM(s) Oral two times a day PRN for dizziness  melatonin 3 milliGRAM(s) Oral at bedtime PRN Insomnia      I&O's Summary    08 Jan 2025 07:01  -  09 Jan 2025 07:00  --------------------------------------------------------  IN: 0 mL / OUT: 200 mL / NET: -200 mL        PHYSICAL EXAM:  Vital Signs Last 24 Hrs  T(C): 36.3 (09 Jan 2025 15:45), Max: 36.7 (08 Jan 2025 18:25)  T(F): 97.3 (09 Jan 2025 15:45), Max: 98.1 (08 Jan 2025 18:25)  HR: 64 (09 Jan 2025 15:45) (64 - 90)  BP: 136/65 (09 Jan 2025 15:45) (103/60 - 156/60)  BP(mean): --  RR: 16 (09 Jan 2025 15:45) (16 - 18)  SpO2: 94% (09 Jan 2025 15:45) (93% - 100%)    Parameters below as of 09 Jan 2025 15:45  Patient On (Oxygen Delivery Method): room air      CONSTITUTIONAL: NAD  EYES: PERRLA; conjunctiva and sclera clear  ENMT: Moist oral mucosa, no pharyngeal injection or exudates; normal dentition  NECK: Supple, no palpable masses; no thyromegaly  RESPIRATORY: Normal respiratory effort; lungs are clear to auscultation bilaterally; no rales, rhonchi, or wheezing  CARDIOVASCULAR: Regular rate and rhythm, normal S1 and S2, no murmur/rub/gallop; Peripheral pulses are 2+ bilaterally  ABDOMEN: Nontender to palpation, normoactive bowel sounds, no rebound/guarding; No hepatosplenomegaly  MUSCULOSKELETAL: no clubbing or cyanosis of digits  NEUROLOGY: Awake and alert to person; no focal neurological deficits   SKIN: No rashes; no palpable lesions    LABS:                        13.6   8.98  )-----------( 295      ( 08 Jan 2025 09:35 )             38.5     01-09    135  |  101  |  15  ----------------------------<  102[H]  4.8   |  23  |  1.58[H]    Ca    9.6      09 Jan 2025 07:05  Phos  2.6     01-09  Mg     2.20     01-09            Urinalysis Basic - ( 09 Jan 2025 07:05 )    Color: x / Appearance: x / SG: x / pH: x  Gluc: 102 mg/dL / Ketone: x  / Bili: x / Urobili: x   Blood: x / Protein: x / Nitrite: x   Leuk Esterase: x / RBC: x / WBC x   Sq Epi: x / Non Sq Epi: x / Bacteria: x            Imaging and consultant notes reviewed.

## 2025-01-09 NOTE — PHYSICAL THERAPY INITIAL EVALUATION ADULT - ADDITIONAL COMMENTS
history obtained via pt's aide at bedside due to Pt presenting with confusion. Pt lives with her  in an apartment with 0 stairs to enter; Pt resides on the first floor. prior to admission Pt required contact guard assist for ambulation with a rolling walker. Pt has a home health aide 6 days/5 hours. Medical equipment: Shower chair, rollator, rolling walker.     Pt. left comfortable in bed with all tubes/lines intact, head of the bed elevated, call bell in reach and in NAD. RNTian, aware of session and pt current position.

## 2025-01-09 NOTE — SWALLOW BEDSIDE ASSESSMENT ADULT - ASR SWALLOW ASPIRATION MONITOR
change of breathing pattern/oral hygiene/position upright (90Y)/cough/gurgly voice/fever/pneumonia/throat clearing/upper respiratory infection
EOMI; PERRL; no drainage or redness

## 2025-01-09 NOTE — SWALLOW BEDSIDE ASSESSMENT ADULT - ASR SWALLOW RECOMMEND DIAG
Cinesophagram to objectively assess swallow mechanism given patient report of globus sensation and daughter report of patient "choking" (coughing) with thin liquids prior to admission

## 2025-01-09 NOTE — PROGRESS NOTE ADULT - PROBLEM SELECTOR PLAN 8
DVT ppx: heparin s/c  Dispo: PT recommended [facility] (family interested), pending cinesophogram, improvement in kidney function and TOV tonight

## 2025-01-09 NOTE — SWALLOW BEDSIDE ASSESSMENT ADULT - SWALLOW EVAL: DIAGNOSIS
1. Mild oral dysphagia for puree, soft and bite-sized solids, mildly thick liquids and thin liquids characterized by adequate oral containment, slow mastication/ gumming of soft and bite-sized solids however able to break down, reduced anterior to posterior transport with adequate oral clearance. 2. Functional pharyngeal stage suspected for the aforementioned consistencies characterized by initiation of the pharyngeal swallow and hyolaryngeal excursion upon digital palpation with no overt s/s penetration/ aspiration noted. Of note: Patient presented with trial of regular solids (alton cracker) patient place alton cracker partially into oral cavity and stated "I am not able to bite this I have no teeth, I can't chew that" and subsequently handed it back to the SLP. At this time, patient/ family offered that patient consumes softer solids at home rather than regular solids which was previously/ initially mentioned.

## 2025-01-09 NOTE — PHYSICAL THERAPY INITIAL EVALUATION ADULT - PERTINENT HX OF CURRENT PROBLEM, REHAB EVAL
Pt is an 84 year old female with history of dementia, further history below, who presents with multiple complaints including dizziness, chest pain, and dyspnea.  Found to have urinary retention in emergency department with labs showing hyponatremia. [Follow-Up: _____] : a [unfilled] follow-up visit [Parent] : parent [FreeTextEntry1] : left leg re opened from 3 weeks ago

## 2025-01-09 NOTE — PROGRESS NOTE ADULT - PROBLEM SELECTOR PLAN 1
Cr 1.71 on admission, now mildly improving   - s/p IVF and Hernandez placement  - kidney and bladder USG reviewed: Minimally increased renal cortical echotexture, without obstructive   uropathy, c/w medical renal disease  - monitor BMP daily

## 2025-01-09 NOTE — SWALLOW BEDSIDE ASSESSMENT ADULT - POSITIONING
Labor Progress Note - Dale Cheadle 20 y o  female MRN: [id number]    Unit/Bed#: -01 Encounter: [id number]    Obstetric History       T1      L1     SAB2   TAB0   Ectopic0   Multiple0   Live Births1      Gestational Age: 43w3d     Contraction Frequency (minutes): 10  Contraction Quality: Moderate  Tachysystole: No   Dilation: 7        Effacement (%): 70  Station: -1  Baseline Rate: 125 bpm  Fetal Heart Rate: 140 BPM  FHR Category: Category I          Notes/comments:     Given the protracted dilation will start Pitocin augmentation of labor  Consent obtained and patient agrees   Patient is comfortable with her epidural     Kayla Trevizo MD 2017 8:07 AM upright (90 degrees)

## 2025-01-09 NOTE — SWALLOW BEDSIDE ASSESSMENT ADULT - DIET PRIOR TO ADMI
Initially reporting regular solids; however, upon further questioning soft solids with thin liquids per patient/ daughter

## 2025-01-09 NOTE — SWALLOW BEDSIDE ASSESSMENT ADULT - ADDITIONAL RECOMMENDATIONS
1. Consider ENT consult at MD's discretion given patient reporting throat pain during assessment. Per discussion with daughter, patient has not previously mentioned throat pain. 2. This service to follow for diet tolerance as schedule permits. 3. Medical team advised to reconsult this service if patient is with a change in medical status or change in tolerance of recommended PO. 4. SLP provided education regarding recommendations to RN/ ACP via TEAMS. SLP provided recommendations/ education to patient, daughter (at bedside) and additional daughter (via phone). All questions were answered.

## 2025-01-10 LAB
ANION GAP SERPL CALC-SCNC: 11 MMOL/L — SIGNIFICANT CHANGE UP (ref 7–14)
BUN SERPL-MCNC: 16 MG/DL — SIGNIFICANT CHANGE UP (ref 7–23)
CALCIUM SERPL-MCNC: 9.4 MG/DL — SIGNIFICANT CHANGE UP (ref 8.4–10.5)
CHLORIDE SERPL-SCNC: 100 MMOL/L — SIGNIFICANT CHANGE UP (ref 98–107)
CO2 SERPL-SCNC: 25 MMOL/L — SIGNIFICANT CHANGE UP (ref 22–31)
CREAT SERPL-MCNC: 1.59 MG/DL — HIGH (ref 0.5–1.3)
EGFR: 32 ML/MIN/1.73M2 — LOW
GLUCOSE SERPL-MCNC: 110 MG/DL — HIGH (ref 70–99)
MAGNESIUM SERPL-MCNC: 2.2 MG/DL — SIGNIFICANT CHANGE UP (ref 1.6–2.6)
PHOSPHATE SERPL-MCNC: 2.6 MG/DL — SIGNIFICANT CHANGE UP (ref 2.5–4.5)
POTASSIUM SERPL-MCNC: 4.7 MMOL/L — SIGNIFICANT CHANGE UP (ref 3.5–5.3)
POTASSIUM SERPL-SCNC: 4.7 MMOL/L — SIGNIFICANT CHANGE UP (ref 3.5–5.3)
SODIUM SERPL-SCNC: 136 MMOL/L — SIGNIFICANT CHANGE UP (ref 135–145)

## 2025-01-10 PROCEDURE — 99233 SBSQ HOSP IP/OBS HIGH 50: CPT

## 2025-01-10 RX ADMIN — HEPARIN SODIUM 5000 UNIT(S): 1000 INJECTION, SOLUTION INTRAVENOUS; SUBCUTANEOUS at 05:27

## 2025-01-10 RX ADMIN — LATANOPROST 1 DROP(S): 50 SOLUTION OPHTHALMIC at 22:02

## 2025-01-10 RX ADMIN — HEPARIN SODIUM 5000 UNIT(S): 1000 INJECTION, SOLUTION INTRAVENOUS; SUBCUTANEOUS at 22:02

## 2025-01-10 RX ADMIN — NIFEDIPINE 60 MILLIGRAM(S): 60 TABLET, EXTENDED RELEASE ORAL at 05:26

## 2025-01-10 RX ADMIN — Medication 3 MILLIGRAM(S): at 22:02

## 2025-01-10 RX ADMIN — Medication 20 MILLIGRAM(S): at 15:13

## 2025-01-10 RX ADMIN — Medication 200 MILLIGRAM(S): at 05:26

## 2025-01-10 RX ADMIN — Medication 0.5 MILLIGRAM(S): at 17:46

## 2025-01-10 RX ADMIN — Medication 0.5 MILLIGRAM(S): at 05:27

## 2025-01-10 RX ADMIN — HEPARIN SODIUM 5000 UNIT(S): 1000 INJECTION, SOLUTION INTRAVENOUS; SUBCUTANEOUS at 15:13

## 2025-01-10 RX ADMIN — CHLORHEXIDINE GLUCONATE 1 APPLICATION(S): 1.2 RINSE ORAL at 15:17

## 2025-01-10 RX ADMIN — Medication 1 TABLET(S): at 15:13

## 2025-01-10 NOTE — PROGRESS NOTE ADULT - SUBJECTIVE AND OBJECTIVE BOX
Division of Hospital Medicine  Gela Abrams MD  Available via MS Teams    SUBJECTIVE / OVERNIGHT EVENTS: No active issues overnight; pt denies any new symptoms    MEDICATIONS  (STANDING):  chlorhexidine 2% Cloths 1 Application(s) Topical daily  clonazePAM  Tablet 0.5 milliGRAM(s) Oral two times a day  FLUoxetine 20 milliGRAM(s) Oral daily  heparin   Injectable 5000 Unit(s) SubCutaneous every 8 hours  influenza  Vaccine (HIGH DOSE) 0.5 milliLiter(s) IntraMuscular once  latanoprost 0.005% Ophthalmic Solution 1 Drop(s) Both EYES at bedtime  metoprolol succinate  milliGRAM(s) Oral daily  multivitamin 1 Tablet(s) Oral daily  NIFEdipine XL 60 milliGRAM(s) Oral daily  sodium chloride 0.9%. 1000 milliLiter(s) (100 mL/Hr) IV Continuous <Continuous>    MEDICATIONS  (PRN):  acetaminophen     Tablet .. 650 milliGRAM(s) Oral every 6 hours PRN Temp greater or equal to 38C (100.4F), Mild Pain (1 - 3)  albuterol/ipratropium for Nebulization 3 milliLiter(s) Nebulizer every 6 hours PRN Shortness of Breath and/or Wheezing  meclizine 12.5 milliGRAM(s) Oral two times a day PRN for dizziness  melatonin 3 milliGRAM(s) Oral at bedtime PRN Insomnia      I&O's Summary      PHYSICAL EXAM:  Vital Signs Last 24 Hrs  T(C): 36.4 (10 Jaswant 2025 10:25), Max: 36.7 (09 Jan 2025 22:31)  T(F): 97.5 (10 Jaswant 2025 10:25), Max: 98 (09 Jan 2025 22:31)  HR: 62 (10 Jaswant 2025 10:25) (62 - 75)  BP: 138/60 (10 Jaswant 2025 10:25) (138/60 - 148/71)  BP(mean): --  RR: 16 (10 Jaswant 2025 10:25) (16 - 17)  SpO2: 94% (10 Jaswant 2025 10:25) (93% - 96%)    Parameters below as of 10 Jaswant 2025 10:25  Patient On (Oxygen Delivery Method): room air      CONSTITUTIONAL: NAD  EYES: PERRLA; conjunctiva and sclera clear  ENMT: Moist oral mucosa, no pharyngeal injection or exudates; normal dentition  NECK: Supple, no palpable masses; no thyromegaly  RESPIRATORY: Normal respiratory effort; lungs are clear to auscultation bilaterally; no rales, rhonchi, or wheezing  CARDIOVASCULAR: Regular rate and rhythm, normal S1 and S2, no murmur/rub/gallop; Peripheral pulses are 2+ bilaterally  ABDOMEN: Nontender to palpation, normoactive bowel sounds, no rebound/guarding; No hepatosplenomegaly  MUSCULOSKELETAL: no clubbing or cyanosis of digits  NEUROLOGY: Awake and alert to person; no focal neurological deficits   SKIN: No rashes; no palpable lesions    LABS:    01-10    136  |  100  |  16  ----------------------------<  110[H]  4.7   |  25  |  1.59[H]    Ca    9.4      10 Jaswant 2025 09:19  Phos  2.6     01-10  Mg     2.20     01-10            Urinalysis Basic - ( 10 Jaswant 2025 09:19 )    Color: x / Appearance: x / SG: x / pH: x  Gluc: 110 mg/dL / Ketone: x  / Bili: x / Urobili: x   Blood: x / Protein: x / Nitrite: x   Leuk Esterase: x / RBC: x / WBC x   Sq Epi: x / Non Sq Epi: x / Bacteria: x            Imaging and consultant notes reviewed.

## 2025-01-10 NOTE — SWALLOW VFSS/MBS ASSESSMENT ADULT - COMMENTS
Hospitalist 1/9, "85 y/o F with pmh of dementia, COPD, CHF, HTN, anxiety, bladder ca s/p resection/chemo, breast cancer s/p lumpectomy, p/w multiple complaints including dizziness, chest pain, and dyspnea.  Found to have urinary retention in ED with labs showing"     Of note: Patient known to this service, seen for initial bedside swallow evaluation on 1/10 with recommendations for "1. Continue soft and bite-sized solids with thin liquids; Cinesophagram to objectively assess swallow mechanism given patient report of globus sensation and daughter report of patient "choking" (coughing) with thin liquids prior to admission" (see note).     Patient received in Radiology Holding Room for scheduled cinesophagram. Patient Syriac speaking Language Line utilized via i-pad ID#[id number]. Patient screaming, "why am I here, I am going home, they told me yesterday I am going home"... SLP attempted to provide verbal encouragement and education regarding purpose of assessment/ cinesophagram. Patient continuing to speak over  and stating "I had this yesterday, I am going home, send me home" SLP again provided ongoing verbal encouragement for participation in test; however, patient continued to speak loudly/ continuously about "going home." SLP called ACP Yasmine regarding patient presentation. ACP in agreement to defer cinesophagram at this time with patient being sent back to unit/ room. Cinesophagram therefore NOT completed. Please reconsult this service as warranted/ patient willing to participate in cinesophagram.

## 2025-01-10 NOTE — PROGRESS NOTE ADULT - PROBLEM SELECTOR PLAN 8
DVT ppx: heparin s/c  Dispo: PT recommended [facility], referrals sent on 01/10, pending acceptance and auth, f/u with CM/SW on Monday 01/13

## 2025-01-11 LAB
ANION GAP SERPL CALC-SCNC: 10 MMOL/L — SIGNIFICANT CHANGE UP (ref 7–14)
BUN SERPL-MCNC: 17 MG/DL — SIGNIFICANT CHANGE UP (ref 7–23)
CALCIUM SERPL-MCNC: 9.1 MG/DL — SIGNIFICANT CHANGE UP (ref 8.4–10.5)
CHLORIDE SERPL-SCNC: 103 MMOL/L — SIGNIFICANT CHANGE UP (ref 98–107)
CO2 SERPL-SCNC: 22 MMOL/L — SIGNIFICANT CHANGE UP (ref 22–31)
CREAT SERPL-MCNC: 1.65 MG/DL — HIGH (ref 0.5–1.3)
EGFR: 30 ML/MIN/1.73M2 — LOW
GLUCOSE SERPL-MCNC: 117 MG/DL — HIGH (ref 70–99)
HCT VFR BLD CALC: 42.5 % — SIGNIFICANT CHANGE UP (ref 34.5–45)
HGB BLD-MCNC: 14.8 G/DL — SIGNIFICANT CHANGE UP (ref 11.5–15.5)
MAGNESIUM SERPL-MCNC: 2.1 MG/DL — SIGNIFICANT CHANGE UP (ref 1.6–2.6)
MCHC RBC-ENTMCNC: 32.5 PG — SIGNIFICANT CHANGE UP (ref 27–34)
MCHC RBC-ENTMCNC: 34.8 G/DL — SIGNIFICANT CHANGE UP (ref 32–36)
MCV RBC AUTO: 93.2 FL — SIGNIFICANT CHANGE UP (ref 80–100)
NRBC # BLD: 0 /100 WBCS — SIGNIFICANT CHANGE UP (ref 0–0)
NRBC # FLD: 0 K/UL — SIGNIFICANT CHANGE UP (ref 0–0)
PHOSPHATE SERPL-MCNC: 3.1 MG/DL — SIGNIFICANT CHANGE UP (ref 2.5–4.5)
PLATELET # BLD AUTO: 324 K/UL — SIGNIFICANT CHANGE UP (ref 150–400)
POTASSIUM SERPL-MCNC: 4.6 MMOL/L — SIGNIFICANT CHANGE UP (ref 3.5–5.3)
POTASSIUM SERPL-SCNC: 4.6 MMOL/L — SIGNIFICANT CHANGE UP (ref 3.5–5.3)
RBC # BLD: 4.56 M/UL — SIGNIFICANT CHANGE UP (ref 3.8–5.2)
RBC # FLD: 12.1 % — SIGNIFICANT CHANGE UP (ref 10.3–14.5)
SODIUM SERPL-SCNC: 135 MMOL/L — SIGNIFICANT CHANGE UP (ref 135–145)
WBC # BLD: 11.54 K/UL — HIGH (ref 3.8–10.5)
WBC # FLD AUTO: 11.54 K/UL — HIGH (ref 3.8–10.5)

## 2025-01-11 PROCEDURE — 99233 SBSQ HOSP IP/OBS HIGH 50: CPT

## 2025-01-11 RX ORDER — PANTOPRAZOLE 40 MG/1
40 TABLET, DELAYED RELEASE ORAL DAILY
Refills: 0 | Status: DISCONTINUED | OUTPATIENT
Start: 2025-01-11 | End: 2025-01-12

## 2025-01-11 RX ORDER — SODIUM CHLORIDE 9 MG/ML
1000 INJECTION, SOLUTION INTRAMUSCULAR; INTRAVENOUS; SUBCUTANEOUS
Refills: 0 | Status: DISCONTINUED | OUTPATIENT
Start: 2025-01-11 | End: 2025-01-15

## 2025-01-11 RX ADMIN — NIFEDIPINE 60 MILLIGRAM(S): 60 TABLET, EXTENDED RELEASE ORAL at 06:37

## 2025-01-11 RX ADMIN — Medication 200 MILLIGRAM(S): at 06:37

## 2025-01-11 RX ADMIN — HEPARIN SODIUM 5000 UNIT(S): 1000 INJECTION, SOLUTION INTRAVENOUS; SUBCUTANEOUS at 21:40

## 2025-01-11 RX ADMIN — SODIUM CHLORIDE 60 MILLILITER(S): 9 INJECTION, SOLUTION INTRAMUSCULAR; INTRAVENOUS; SUBCUTANEOUS at 16:47

## 2025-01-11 RX ADMIN — Medication 0.5 MILLIGRAM(S): at 06:40

## 2025-01-11 RX ADMIN — LATANOPROST 1 DROP(S): 50 SOLUTION OPHTHALMIC at 21:40

## 2025-01-11 RX ADMIN — HEPARIN SODIUM 5000 UNIT(S): 1000 INJECTION, SOLUTION INTRAVENOUS; SUBCUTANEOUS at 06:37

## 2025-01-11 RX ADMIN — Medication 0.5 MILLIGRAM(S): at 18:18

## 2025-01-11 RX ADMIN — HEPARIN SODIUM 5000 UNIT(S): 1000 INJECTION, SOLUTION INTRAVENOUS; SUBCUTANEOUS at 13:30

## 2025-01-11 RX ADMIN — CHLORHEXIDINE GLUCONATE 1 APPLICATION(S): 1.2 RINSE ORAL at 14:22

## 2025-01-11 RX ADMIN — Medication 20 MILLIGRAM(S): at 13:31

## 2025-01-11 RX ADMIN — Medication 3 MILLIGRAM(S): at 21:40

## 2025-01-11 RX ADMIN — Medication 1 TABLET(S): at 13:31

## 2025-01-11 NOTE — PROVIDER CONTACT NOTE (OTHER) - BACKGROUND
Pt admitted d/t acute renal failure. Pt has a PMH of dementia, COPD, CHF, HTN, anxiety, and bladder cancer.

## 2025-01-11 NOTE — PROGRESS NOTE ADULT - SUBJECTIVE AND OBJECTIVE BOX
Division of Hospital Medicine  Gela Abrams MD  Available via MS Teams    SUBJECTIVE / OVERNIGHT EVENTS: No active issues overnight; pt denies any new symptoms    MEDICATIONS  (STANDING):  chlorhexidine 2% Cloths 1 Application(s) Topical daily  clonazePAM  Tablet 0.5 milliGRAM(s) Oral two times a day  FLUoxetine 20 milliGRAM(s) Oral daily  heparin   Injectable 5000 Unit(s) SubCutaneous every 8 hours  influenza  Vaccine (HIGH DOSE) 0.5 milliLiter(s) IntraMuscular once  latanoprost 0.005% Ophthalmic Solution 1 Drop(s) Both EYES at bedtime  metoprolol succinate  milliGRAM(s) Oral daily  multivitamin 1 Tablet(s) Oral daily  NIFEdipine XL 60 milliGRAM(s) Oral daily  sodium chloride 0.9%. 1000 milliLiter(s) (100 mL/Hr) IV Continuous <Continuous>    MEDICATIONS  (PRN):  acetaminophen     Tablet .. 650 milliGRAM(s) Oral every 6 hours PRN Temp greater or equal to 38C (100.4F), Mild Pain (1 - 3)  albuterol/ipratropium for Nebulization 3 milliLiter(s) Nebulizer every 6 hours PRN Shortness of Breath and/or Wheezing  meclizine 12.5 milliGRAM(s) Oral two times a day PRN for dizziness  melatonin 3 milliGRAM(s) Oral at bedtime PRN Insomnia      I&O's Summary    10 Jaswant 2025 07:01  -  11 Jan 2025 07:00  --------------------------------------------------------  IN: 0 mL / OUT: 800 mL / NET: -800 mL        PHYSICAL EXAM:  Vital Signs Last 24 Hrs  T(C): 36.7 (11 Jan 2025 10:26), Max: 37 (10 Jaswant 2025 21:56)  T(F): 98.1 (11 Jan 2025 10:26), Max: 98.6 (10 Jaswant 2025 21:56)  HR: 77 (11 Jan 2025 10:26) (66 - 82)  BP: 121/73 (11 Jan 2025 10:26) (121/73 - 154/91)  BP(mean): --  RR: 17 (11 Jan 2025 10:26) (16 - 17)  SpO2: 95% (11 Jan 2025 10:26) (94% - 99%)    Parameters below as of 11 Jan 2025 10:26  Patient On (Oxygen Delivery Method): room air      CONSTITUTIONAL: NAD  EYES: PERRLA; conjunctiva and sclera clear  ENMT: Moist oral mucosa, no pharyngeal injection or exudates; normal dentition  NECK: Supple, no palpable masses; no thyromegaly  RESPIRATORY: Normal respiratory effort; lungs are clear to auscultation bilaterally; no rales, rhonchi, or wheezing  CARDIOVASCULAR: Regular rate and rhythm, normal S1 and S2, no murmur/rub/gallop; Peripheral pulses are 2+ bilaterally  ABDOMEN: Nontender to palpation, normoactive bowel sounds, no rebound/guarding; No hepatosplenomegaly  MUSCULOSKELETAL: no clubbing or cyanosis of digits  NEUROLOGY: Awake and alert to person; no focal neurological deficits   SKIN: No rashes; no palpable lesions    LABS:    01-11    135  |  103  |  17  ----------------------------<  117[H]  4.6   |  22  |  1.65[H]    Ca    9.1      11 Jan 2025 06:00  Phos  3.1     01-11  Mg     2.10     01-11            Urinalysis Basic - ( 11 Jan 2025 06:00 )    Color: x / Appearance: x / SG: x / pH: x  Gluc: 117 mg/dL / Ketone: x  / Bili: x / Urobili: x   Blood: x / Protein: x / Nitrite: x   Leuk Esterase: x / RBC: x / WBC x   Sq Epi: x / Non Sq Epi: x / Bacteria: x            Imaging and consultant notes reviewed.

## 2025-01-11 NOTE — PROGRESS NOTE ADULT - PROBLEM SELECTOR PLAN 1
Cr 1.71 on admission  - s/p IVF and Hernandez placement  - kidney and bladder USG reviewed: Minimally increased renal cortical echotexture, without obstructive   uropathy, c/w medical renal disease  - monitor BMP daily

## 2025-01-12 LAB
ANION GAP SERPL CALC-SCNC: 14 MMOL/L — SIGNIFICANT CHANGE UP (ref 7–14)
BLD GP AB SCN SERPL QL: NEGATIVE — SIGNIFICANT CHANGE UP
BUN SERPL-MCNC: 23 MG/DL — SIGNIFICANT CHANGE UP (ref 7–23)
CALCIUM SERPL-MCNC: 9.7 MG/DL — SIGNIFICANT CHANGE UP (ref 8.4–10.5)
CHLORIDE SERPL-SCNC: 98 MMOL/L — SIGNIFICANT CHANGE UP (ref 98–107)
CO2 SERPL-SCNC: 27 MMOL/L — SIGNIFICANT CHANGE UP (ref 22–31)
CREAT SERPL-MCNC: 1.57 MG/DL — HIGH (ref 0.5–1.3)
EGFR: 32 ML/MIN/1.73M2 — LOW
GLUCOSE SERPL-MCNC: 140 MG/DL — HIGH (ref 70–99)
MAGNESIUM SERPL-MCNC: 2.4 MG/DL — SIGNIFICANT CHANGE UP (ref 1.6–2.6)
PHOSPHATE SERPL-MCNC: 2.8 MG/DL — SIGNIFICANT CHANGE UP (ref 2.5–4.5)
POTASSIUM SERPL-MCNC: 4.4 MMOL/L — SIGNIFICANT CHANGE UP (ref 3.5–5.3)
POTASSIUM SERPL-SCNC: 4.4 MMOL/L — SIGNIFICANT CHANGE UP (ref 3.5–5.3)
RH IG SCN BLD-IMP: POSITIVE — SIGNIFICANT CHANGE UP
SODIUM SERPL-SCNC: 139 MMOL/L — SIGNIFICANT CHANGE UP (ref 135–145)

## 2025-01-12 PROCEDURE — 99233 SBSQ HOSP IP/OBS HIGH 50: CPT

## 2025-01-12 RX ADMIN — HEPARIN SODIUM 5000 UNIT(S): 1000 INJECTION, SOLUTION INTRAVENOUS; SUBCUTANEOUS at 13:17

## 2025-01-12 RX ADMIN — Medication 1 TABLET(S): at 13:17

## 2025-01-12 RX ADMIN — CHLORHEXIDINE GLUCONATE 1 APPLICATION(S): 1.2 RINSE ORAL at 13:18

## 2025-01-12 RX ADMIN — HEPARIN SODIUM 5000 UNIT(S): 1000 INJECTION, SOLUTION INTRAVENOUS; SUBCUTANEOUS at 05:29

## 2025-01-12 RX ADMIN — Medication 20 MILLIGRAM(S): at 13:17

## 2025-01-12 RX ADMIN — Medication 200 MILLIGRAM(S): at 05:29

## 2025-01-12 RX ADMIN — Medication 0.5 MILLIGRAM(S): at 05:32

## 2025-01-12 RX ADMIN — LATANOPROST 1 DROP(S): 50 SOLUTION OPHTHALMIC at 21:43

## 2025-01-12 RX ADMIN — NIFEDIPINE 60 MILLIGRAM(S): 60 TABLET, EXTENDED RELEASE ORAL at 05:29

## 2025-01-12 RX ADMIN — HEPARIN SODIUM 5000 UNIT(S): 1000 INJECTION, SOLUTION INTRAVENOUS; SUBCUTANEOUS at 21:45

## 2025-01-12 RX ADMIN — Medication 0.5 MILLIGRAM(S): at 17:08

## 2025-01-12 NOTE — DISCHARGE NOTE PROVIDER - NSDCMRMEDTOKEN_GEN_ALL_CORE_FT
acetaminophen 325 mg oral tablet: 2 tab(s) orally every 6 hours As needed Mild Pain (1 - 3), Moderate Pain (4 - 6)  clonazePAM 0.5 mg oral tablet: 1 tab(s) orally 2 times a day  ergocalciferol 1.25 mg (50,000 intl units) oral capsule: 1 cap(s) orally once a week  FLUoxetine 20 mg oral capsule: 1 cap(s) orally once a day  furosemide 20 mg oral tablet: 1 tab(s) orally once a day  latanoprost 0.005% ophthalmic solution: 1 drop(s) to each affected eye once a day (at bedtime)  losartan 100 mg oral tablet: 1 tab(s) orally once a day  meclizine 12.5 mg oral tablet: 1 tab(s) orally 2 times a day as needed for  dizziness  metoprolol succinate 200 mg oral tablet, extended release: 1 tab(s) orally once a day  Multiple Vitamins oral tablet: 1 tab(s) orally once a day  NIFEdipine 60 mg oral tablet, extended release: 1 tab(s) orally once a day   acetaminophen 325 mg oral tablet: 2 tab(s) orally every 6 hours As needed Mild Pain (1 - 3), Moderate Pain (4 - 6)  clonazePAM 0.5 mg oral tablet: 1 tab(s) orally 2 times a day  ergocalciferol 1.25 mg (50,000 intl units) oral capsule: 1 cap(s) orally once a week  FLUoxetine 20 mg oral capsule: 1 cap(s) orally once a day  furosemide 20 mg oral tablet: 1 tab(s) orally once a day  latanoprost 0.005% ophthalmic solution: 1 drop(s) to each affected eye once a day (at bedtime)  losartan 100 mg oral tablet: 1 tab(s) orally once a day  meclizine 12.5 mg oral tablet: 1 tab(s) orally 2 times a day as needed for  dizziness  metoprolol succinate 200 mg oral tablet, extended release: 1 tab(s) orally once a day  Multiple Vitamins oral tablet: 1 tab(s) orally once a day  NIFEdipine 60 mg oral tablet, extended release: 1 tab(s) orally once a day  pantoprazole 40 mg oral delayed release tablet: 1 tab(s) orally once a day (before a meal)   acetaminophen 325 mg oral tablet: 2 tab(s) orally every 6 hours As needed Mild Pain (1 - 3), Moderate Pain (4 - 6)  clonazePAM 0.5 mg oral tablet: 1 tab(s) orally 2 times a day  FLUoxetine 20 mg oral capsule: 1 cap(s) orally once a day  latanoprost 0.005% ophthalmic solution: 1 drop(s) to each affected eye once a day (at bedtime)  meclizine 12.5 mg oral tablet: 1 tab(s) orally 2 times a day as needed for  dizziness  metoprolol succinate 200 mg oral tablet, extended release: 1 tab(s) orally once a day  Multiple Vitamins oral tablet: 1 tab(s) orally once a day  NIFEdipine 60 mg oral tablet, extended release: 1 tab(s) orally once a day  pantoprazole 40 mg oral delayed release tablet: 1 tab(s) orally once a day (before a meal)

## 2025-01-12 NOTE — DISCHARGE NOTE PROVIDER - HOSPITAL COURSE
HPI:  83 y/o F with pmh of dementia, COPD, CHF, HTN, anxiety, bladder ca s/p resection/chemo, breast cancer s/p lumpectomy, p/w multiple complaints including dizziness, chest pain, and dyspnea.  HHA also noticed that pt's urine smelled foul.  HHA has not noticed vomiting or diarrhea.  Attempted to talk to pt via  phone, but pt states "leave me alone" and that she wants to sleep.      In the ED, pt had Na of 126 on labs.  She had urinary retention, and had aguayo catheter placed with production of 1L of urine.  She was given 1LNS, Duoneb, Solumedrol, and Tylenol.  (06 Jan 2025 22:31)      Hospital Course:    83 y/o F with pmh of dementia, COPD, CHF, HTN, anxiety, bladder ca s/p resection/chemo, breast cancer s/p lumpectomy, p/w multiple complaints including dizziness, chest pain, and dyspnea.  Found to have urinary retention in ED with labs showing hyponatremia.         Problem/Plan - 1:  •? Problem: RACHAEL (acute kidney injury).   •? Plan: Cr 1.71 on admission, now improving   - s/p IVF and Aguayo placement  - kidney and bladder USG reviewed: Minimally increased renal cortical echotexture, without obstructive   uropathy, c/w medical renal disease  - monitor BMP.     Problem/Plan - 2:  •? Problem: Urinary retention.   •? Plan: - s/p aguayo catheter placement  - s/p voiding trial 01/09 midnight, f/u bladder scans.     Problem/Plan - 3:  •? Problem: Hyponatremia.   •? Plan: Likely hypovolemic  - resolved with IV NS  - monitor BMP.     Problem/Plan - 4:  •? Problem: Acute delirium.   •? Plan: Psych recs appreciated:  - No indication for a psychiatric 1:1  - vitamin b12 and folate levels WNL  - TSH normal  - UA/UCx is negative  - Resumed home Fluoxetine and Klonopin  - Frequent orientation  - Avoid additional bzd, anticholinergics, and antihistamines  - ONLY FOR COMBATIVE BEHAVIORS IF QTC <500, Haldol 0.5mg q 6hrs prn IM/IV/PO.     Problem/Plan - 5:  •? Problem: Chronic obstructive pulmonary disease.   •? Plan: Currently breathing comfortably without wheezing.   - Duoneb PRN.     Problem/Plan - 6:  •? Problem: Chronic diastolic congestive heart failure.   •? Plan: - holding Lasix as pt not volume overloaded, and also i/s/o RACHAEL  - holding Losartan 2/2 RACHAEL.     Problem/Plan - 7:  •? Problem: Essential hypertension.   •? Plan: - continue nifedipine and toprol XL.     Problem/Plan - 8:  •? Problem: Prophylactic measure.   •? Plan: DVT ppx: heparin s/c  Dispo: PT recommended [facility]              HPI:  85 y/o F with pmh of dementia, COPD, CHF, HTN, anxiety, bladder ca s/p resection/chemo, breast cancer s/p lumpectomy, p/w multiple complaints including dizziness, chest pain, and dyspnea.  HHA also noticed that pt's urine smelled foul.  HHA has not noticed vomiting or diarrhea.  Attempted to talk to pt via  phone, but pt states "leave me alone" and that she wants to sleep.      In the ED, pt had Na of 126 on labs.  She had urinary retention, and had aguayo catheter placed with production of 1L of urine.  She was given 1LNS, Duoneb, Solumedrol, and Tylenol.  (06 Jan 2025 22:31)      Hospital Course:    85 y/o F with pmh of dementia, COPD, CHF, HTN, anxiety, bladder ca s/p resection/chemo, breast cancer s/p lumpectomy, p/w multiple complaints including dizziness, chest pain, and dyspnea.  Found to have urinary retention in ED with labs showing hyponatremia.      RACHAEL (acute kidney injury)  -Cr 1.71 on admission, now improving   - s/p IVF and Aguayo placement  - kidney and bladder USG reviewed: Minimally increased renal cortical echotexture, without obstructive   uropathy, c/w medical renal disease    Urinary retention  - s/p aguayo catheter placement     Hyponatremia  -Likely hypovolemic  - resolved with IV NS    Acute delirium   -Psych recs appreciated:  - No indication for a psychiatric 1:1  - vitamin b12 and folate levels WNL  - TSH normal  - UA/UCx is negative  - Resumed home Fluoxetine and Klonopin  - Frequent orientation  - Avoid additional bzd, anticholinergics, and antihistamines  - ONLY FOR COMBATIVE BEHAVIORS IF QTC <500, Haldol 0.5mg q 6hrs prn IM/IV/PO.     Chronic obstructive pulmonary disease  -Currently breathing comfortably without wheezing.   - Duoneb PRN.    Chronic diastolic congestive heart failure.   - holding Lasix as pt not volume overloaded, and also i/s/o RACHAEL  - holding Losartan 2/2 RACHAEL.  -TTE on 1/14 showed mild diastolic dysfxn  -restart lasix and losartan as outpatient in 1 week if BPs elevated and drinking water regularly    Essential hypertension  - continue nifedipine and toprol XL.

## 2025-01-12 NOTE — DISCHARGE NOTE PROVIDER - NSDCCPCAREPLAN_GEN_ALL_CORE_FT
PRINCIPAL DISCHARGE DIAGNOSIS  Diagnosis: RACHAEL (acute kidney injury)  Assessment and Plan of Treatment: Your kidney numbers were elevated, which improved with IV fluids and after placing a Hernandez cathetar. Please f/u with your PCP after discharge.     PRINCIPAL DISCHARGE DIAGNOSIS  Diagnosis: RACHAEL (acute kidney injury)  Assessment and Plan of Treatment: Your kidney numbers were elevated, which improved with IV fluids and after placing a Aguayo catheter. Please f/u with your PCP after discharge.      SECONDARY DISCHARGE DIAGNOSES  Diagnosis: Urinary retention  Assessment and Plan of Treatment: continue with aguayo. try trial of void in 72 hours when more ambulatory.    Diagnosis: Hyponatremia  Assessment and Plan of Treatment: please continue to drink water with each meal.     PRINCIPAL DISCHARGE DIAGNOSIS  Diagnosis: RACHAEL (acute kidney injury)  Assessment and Plan of Treatment: Your kidney numbers were elevated, which improved with IV fluids and after placing a Aguayo catheter. Please f/u with your PCP after discharge.      SECONDARY DISCHARGE DIAGNOSES  Diagnosis: Hyponatremia  Assessment and Plan of Treatment: please continue to drink water with each meal.    Diagnosis: Urinary retention  Assessment and Plan of Treatment: A aguayo was placed for urinary retention, it was removed and you were able to void on your own.

## 2025-01-12 NOTE — PROGRESS NOTE ADULT - PROBLEM SELECTOR PLAN 8
DVT ppx: heparin s/c  Dispo: PT recommended [facility], referrals sent on 01/10, pending acceptance and auth, f/u with SW in AM

## 2025-01-12 NOTE — DISCHARGE NOTE PROVIDER - DETAILS OF MALNUTRITION DIAGNOSIS/DIAGNOSES
This patient has been assessed with a concern for Malnutrition and was treated during this hospitalization for the following Nutrition diagnosis/diagnoses:     -  01/13/2025: Severe protein-calorie malnutrition

## 2025-01-12 NOTE — PROGRESS NOTE ADULT - SUBJECTIVE AND OBJECTIVE BOX
Division of Hospital Medicine  Gela Abrams MD  Available via MS Teams    SUBJECTIVE / OVERNIGHT EVENTS: No active issues overnight; pt denies any new symptoms    MEDICATIONS  (STANDING):  chlorhexidine 2% Cloths 1 Application(s) Topical daily  clonazePAM  Tablet 0.5 milliGRAM(s) Oral two times a day  FLUoxetine 20 milliGRAM(s) Oral daily  heparin   Injectable 5000 Unit(s) SubCutaneous every 8 hours  influenza  Vaccine (HIGH DOSE) 0.5 milliLiter(s) IntraMuscular once  latanoprost 0.005% Ophthalmic Solution 1 Drop(s) Both EYES at bedtime  metoprolol succinate  milliGRAM(s) Oral daily  multivitamin 1 Tablet(s) Oral daily  NIFEdipine XL 60 milliGRAM(s) Oral daily  sodium chloride 0.9%. 1000 milliLiter(s) (100 mL/Hr) IV Continuous <Continuous>  sodium chloride 0.9%. 1000 milliLiter(s) (60 mL/Hr) IV Continuous <Continuous>    MEDICATIONS  (PRN):  acetaminophen     Tablet .. 650 milliGRAM(s) Oral every 6 hours PRN Temp greater or equal to 38C (100.4F), Mild Pain (1 - 3)  albuterol/ipratropium for Nebulization 3 milliLiter(s) Nebulizer every 6 hours PRN Shortness of Breath and/or Wheezing  meclizine 12.5 milliGRAM(s) Oral two times a day PRN for dizziness  melatonin 3 milliGRAM(s) Oral at bedtime PRN Insomnia      I&O's Summary    11 Jan 2025 07:01  -  12 Jan 2025 07:00  --------------------------------------------------------  IN: 0 mL / OUT: 600 mL / NET: -600 mL        PHYSICAL EXAM:  Vital Signs Last 24 Hrs  T(C): 37 (12 Jan 2025 17:31), Max: 37 (12 Jan 2025 17:31)  T(F): 98.6 (12 Jan 2025 17:31), Max: 98.6 (12 Jan 2025 17:31)  HR: 89 (12 Jan 2025 17:31) (86 - 98)  BP: 147/99 (12 Jan 2025 17:31) (129/66 - 150/81)  BP(mean): --  RR: 17 (12 Jan 2025 17:31) (16 - 17)  SpO2: 94% (12 Jan 2025 17:31) (91% - 98%)    Parameters below as of 12 Jan 2025 17:31  Patient On (Oxygen Delivery Method): room air      CONSTITUTIONAL: NAD  EYES: PERRLA; conjunctiva and sclera clear  ENMT: Moist oral mucosa, no pharyngeal injection or exudates; normal dentition  NECK: Supple, no palpable masses; no thyromegaly  RESPIRATORY: Normal respiratory effort; lungs are clear to auscultation bilaterally; no rales, rhonchi, or wheezing  CARDIOVASCULAR: Regular rate and rhythm, normal S1 and S2, no murmur/rub/gallop; Peripheral pulses are 2+ bilaterally  ABDOMEN: Nontender to palpation, normoactive bowel sounds, no rebound/guarding; No hepatosplenomegaly  MUSCULOSKELETAL: no clubbing or cyanosis of digits  NEUROLOGY: Awake and alert to person  SKIN: No rashes; no palpable lesions    LABS:                        14.8   11.54 )-----------( 324      ( 11 Jan 2025 23:08 )             42.5     01-12    139  |  98  |  23  ----------------------------<  140[H]  4.4   |  27  |  1.57[H]    Ca    9.7      12 Jan 2025 06:41  Phos  2.8     01-12  Mg     2.40     01-12            Urinalysis Basic - ( 12 Jan 2025 06:41 )    Color: x / Appearance: x / SG: x / pH: x  Gluc: 140 mg/dL / Ketone: x  / Bili: x / Urobili: x   Blood: x / Protein: x / Nitrite: x   Leuk Esterase: x / RBC: x / WBC x   Sq Epi: x / Non Sq Epi: x / Bacteria: x            Imaging and consultant notes reviewed.

## 2025-01-12 NOTE — PROGRESS NOTE ADULT - PROBLEM SELECTOR PLAN 1
Cr 1.71 on admission, now improving   - s/p IVF and Hernandez placement  - kidney and bladder USG reviewed: Minimally increased renal cortical echotexture, without obstructive   uropathy, c/w medical renal disease  - monitor BMP

## 2025-01-12 NOTE — DISCHARGE NOTE PROVIDER - PROVIDER TOKENS
FREE:[LAST:[Dr. Kalani Kruger],PHONE:[(   )    -],FAX:[(   )    -],ADDRESS:[PCP],FOLLOWUP:[2 weeks]]

## 2025-01-13 LAB
ANION GAP SERPL CALC-SCNC: 12 MMOL/L — SIGNIFICANT CHANGE UP (ref 7–14)
ANION GAP SERPL CALC-SCNC: 18 MMOL/L — HIGH (ref 7–14)
BUN SERPL-MCNC: 38 MG/DL — HIGH (ref 7–23)
BUN SERPL-MCNC: 43 MG/DL — HIGH (ref 7–23)
CALCIUM SERPL-MCNC: 9.2 MG/DL — SIGNIFICANT CHANGE UP (ref 8.4–10.5)
CALCIUM SERPL-MCNC: 9.5 MG/DL — SIGNIFICANT CHANGE UP (ref 8.4–10.5)
CHLORIDE SERPL-SCNC: 100 MMOL/L — SIGNIFICANT CHANGE UP (ref 98–107)
CHLORIDE SERPL-SCNC: 101 MMOL/L — SIGNIFICANT CHANGE UP (ref 98–107)
CO2 SERPL-SCNC: 15 MMOL/L — LOW (ref 22–31)
CO2 SERPL-SCNC: 24 MMOL/L — SIGNIFICANT CHANGE UP (ref 22–31)
CREAT SERPL-MCNC: 1.72 MG/DL — HIGH (ref 0.5–1.3)
CREAT SERPL-MCNC: 1.91 MG/DL — HIGH (ref 0.5–1.3)
EGFR: 25 ML/MIN/1.73M2 — LOW
EGFR: 29 ML/MIN/1.73M2 — LOW
GLUCOSE SERPL-MCNC: 119 MG/DL — HIGH (ref 70–99)
GLUCOSE SERPL-MCNC: 89 MG/DL — SIGNIFICANT CHANGE UP (ref 70–99)
MAGNESIUM SERPL-MCNC: 2.4 MG/DL — SIGNIFICANT CHANGE UP (ref 1.6–2.6)
MAGNESIUM SERPL-MCNC: 2.4 MG/DL — SIGNIFICANT CHANGE UP (ref 1.6–2.6)
PHOSPHATE SERPL-MCNC: 2.8 MG/DL — SIGNIFICANT CHANGE UP (ref 2.5–4.5)
PHOSPHATE SERPL-MCNC: SIGNIFICANT CHANGE UP MG/DL (ref 2.5–4.5)
POTASSIUM SERPL-MCNC: 5.1 MMOL/L — SIGNIFICANT CHANGE UP (ref 3.5–5.3)
POTASSIUM SERPL-MCNC: SIGNIFICANT CHANGE UP MMOL/L (ref 3.5–5.3)
POTASSIUM SERPL-SCNC: 5.1 MMOL/L — SIGNIFICANT CHANGE UP (ref 3.5–5.3)
POTASSIUM SERPL-SCNC: SIGNIFICANT CHANGE UP MMOL/L (ref 3.5–5.3)
SODIUM SERPL-SCNC: 134 MMOL/L — LOW (ref 135–145)
SODIUM SERPL-SCNC: 136 MMOL/L — SIGNIFICANT CHANGE UP (ref 135–145)

## 2025-01-13 PROCEDURE — 99233 SBSQ HOSP IP/OBS HIGH 50: CPT

## 2025-01-13 RX ORDER — PANTOPRAZOLE 40 MG/1
40 TABLET, DELAYED RELEASE ORAL
Refills: 0 | Status: DISCONTINUED | OUTPATIENT
Start: 2025-01-13 | End: 2025-01-15

## 2025-01-13 RX ADMIN — Medication 200 MILLIGRAM(S): at 05:24

## 2025-01-13 RX ADMIN — LATANOPROST 1 DROP(S): 50 SOLUTION OPHTHALMIC at 21:29

## 2025-01-13 RX ADMIN — Medication 20 MILLIGRAM(S): at 14:28

## 2025-01-13 RX ADMIN — Medication 1 TABLET(S): at 14:29

## 2025-01-13 RX ADMIN — HEPARIN SODIUM 5000 UNIT(S): 1000 INJECTION, SOLUTION INTRAVENOUS; SUBCUTANEOUS at 21:28

## 2025-01-13 RX ADMIN — HEPARIN SODIUM 5000 UNIT(S): 1000 INJECTION, SOLUTION INTRAVENOUS; SUBCUTANEOUS at 05:23

## 2025-01-13 RX ADMIN — CHLORHEXIDINE GLUCONATE 1 APPLICATION(S): 1.2 RINSE ORAL at 14:28

## 2025-01-13 RX ADMIN — NIFEDIPINE 60 MILLIGRAM(S): 60 TABLET, EXTENDED RELEASE ORAL at 05:24

## 2025-01-13 RX ADMIN — HEPARIN SODIUM 5000 UNIT(S): 1000 INJECTION, SOLUTION INTRAVENOUS; SUBCUTANEOUS at 14:30

## 2025-01-13 RX ADMIN — Medication 0.5 MILLIGRAM(S): at 17:45

## 2025-01-13 RX ADMIN — Medication 0.5 MILLIGRAM(S): at 06:16

## 2025-01-13 NOTE — DIETITIAN INITIAL EVALUATION ADULT - NUTRITION DIAGNOSITC TERMINOLOGY #1
Patient Demographics     Address  [address] [place] [address],  [id number] Phone  [phone] [facility])      Emergency Contact(s)     Name Relation Home Work Mobile    Elliot Sanchez [place] [phone]      Allergies as of 6/21/2018  Reviewed on: 6 Vitamin D3 12201 units Caps  Next dose due:  Resume your home schedule      Take 50,000 Units by mouth once a week.                 Where to Get Your Medications      Please  your prescriptions at the location directed by your doctor or nurse    Brin SpO2  97 % Filed at 06/21/2018 0845      Patient's Most Recent Weight    Flowsheet Row Most Recent Value   Patient Weight  64.6 kg (142 lb 6.4 oz)         Lab Results Last 24 Hours      BASIC METABOLIC PANEL (8) [[id number]] (Abnormal)  Resulted: 06/21/18 0 MRSA Screen by PCR Once [[id number]]  (Normal) Collected:  06/07/18 2058    Order Status:  Completed Lab Status:  Final result Updated:  06/08/18 2547    Specimen:  Other from [facility]      MRSA Screen By PCR Negative         H&P - H&P Note      H&P signed by • Essential hypertension    • Hyperlipidemia    • Hypokalemia    • Liver disease    • Mental deficiency    • Muscle weakness    • Renal disorder      History reviewed. No pertinent surgical history. History reviewed. No pertinent family history. [CL.2]  So  06/07/2018   CREATSERUM 4.66 (H) 06/07/2018   BUN 40 (H) 06/07/2018    (H) 06/07/2018   K 4.0 06/07/2018    (H) 06/07/2018   CO2 22 06/07/2018    (H) 06/07/2018   CA 9.1 06/07/2018       Ct Brain Or Head ([address])    Result Date: 6 CONCLUSION:  1. Groundglass density nodular opacities in the left upper lobe and right lower lobe measuring 1.2 and 1.4 cm respectively. These may be infectious/inflammatory in nature. Posttreatment chest CT in 3 months is recommended.  2. Neither of the ab –Repeat CT head in a.m.  –No anticoagulation  –Patient used aspirin–platelet transfusion    Hypertensive emergency  –Nicardipine drip  –Resume blood pressure medications  –Improving blood pressure    Acute kidney injury on CKD stage IV  April creatinine 3. that showed intracranial acute hemorrhage he has chronic right eye blindness.   Cannot answer any questions however nursing home paperwork patient's had renal insufficiency chronic pain cognitive communication deficit dementia dysphagia reflux hypertension Potassium Chloride ER (K-DUR M20) CR tab 40 mEq 40 mEq Oral Daily   sodium bicarbonate tab 650 mg Oral BID   Normal Saline Flush 0.9 % injection 3 mL 3 mL Intravenous PRN   Normal Saline Flush 0.9 % injection 10 mL 10 mL Intravenous PRN       Prescriptions Gu exam shows normal external genitalia and hair distribution including penis which is fully developed, circumcised. Glans and meatus are normal.  Penile shaft skin is normal.  There are no masses or plaques. Scrotum is fully developed.   Testicles are de 2.  Mild right hydronephrosis  PLAN:     I did attempt discussion with patient including the benefits risk on plain side effects of cystoscopy right retrograde pyelogram and placement of stent.   However I am hard thought to think this patient would be a ca non-verbal and confused at baseline. Pt was seen for skilled PT with RN approval.   This pleasant, but nonverbal pt., tends to nod yes to questions, smiles, and is agreeable to therapy.       Instructed pt and pt performed both LE GINI [id number] in seated well as need for assist for hand placement during transfers.    Pt will continue to benefit from skilled PT while in the hospital to maximize his functional potential.  Recommend Subacute rehab to further improve mobility and maximize his functional potenti Assistive Device: Rolling walker  Pattern:  (Difficulty advancing RLE,shuffling steps)  Stoop/Curb Assistance: Not tested     Bed mobility  MinMod A supine <> sit.      Transfers  MinMod Ax2 with RW      Patient End of Session: In bed;Needs met;Call light w Left-sided nontraumatic intracerebral hemorrhage, unspecified cerebral location [facility])  Active Problems:    Right sided weakness    BLAS (acute kidney injury) ([facility] [address]    Chronic kidney disease (CKD), stage IV (severe) ([facility] [address]      ASSESSMENT    Patient is a 60 of falling and needed reassurance that he is safe with therapist. Pt does better with functional oriented task and required lesser assist when functional oriented tasks are provided to the pt during PT session.     Pt is improving well in PT and demonstrati wheelchair, bedside commode, etc.): A Lot   -   Moving from lying on back to sitting on the side of the bed?: A Lot   How much help from another person does the patient currently need. ..   -   Moving to and from a bed to a chair (including a wheelchair)?: ST.1 Murry Rinne, PT on 6/18/2018 11:43 AM  ST.2 - Tong Ordaz, PT on 6/18/2018 12:00 PM  ST.3 - Tong Ordaz, PT on 6/18/2018 11:49 AM               Physical Therapy Note signed by Tong Ordaz, PT at 6/18/2018 12:01 PM  Version 3 muscles. Encouraged pt to track his eyes in all directions specially to Rt side to avoid Rt side neglect. Trained pt for standing for 3-4 with RW/Min Ax2. Pt was pushing back initially. After repeated cueing, pt was able to maintain midline.  Pt was able SUBJECTIVE  Nods to questions    OBJECTIVE  Precautions: Limb alert - right    WEIGHT BEARING RESTRICTION  Weight Bearing Restriction: None                PAIN ASSESSMENT   Ratin  Location: denies pain and seemed comfortable  Management Techniques:  Act Patient Goal Patient's self-stated goal is: none stated, unable    Goal #1 Patient is able to demonstrate supine - sit EOB @ level: supervision      Goal #1   Current Status  Mod A   Goal #2 Patient is able to demonstrate transfers Sit to/from Stand at ass Patient is a 60year old male admitted 6/7/2018 for R sided weakness; dx L thalamic intraparenchymal hemorrhage. Pt with hx of previous CVA, non-verbal and confused at baseline.  Pt was seen for skilled PT with RN approval.   This pleasant, but nonverbal p continues to stay below his baseline.  Pt is Limited by decreased ability to follow commands consistently, decreased sitting and standing balance, decrease mobility, transfers, decrease safety, fearful of falling, poor ambulation tolerance due to weakness a -   Need to walk in hospital room?: A Lot   -   Climbing 3-5 steps with a railing?: Total     AM-PAC Score:  Raw Score: 12   PT Approx Degree of Impairment Score: 68.66%   Standardized Score (AM-PAC Scale): 35.33   CMS Modifier (G-Code): CL    FUNCTIONAL A Filed:  6/18/2018 11:48 AM Date of Service:  6/18/2018 11:30 AM Status:  Signed    :  Jayy Don PT (Physical Therapist)    Related Notes:  Addendum by Jayy Don PT (Physical Therapist) filed at 6/18/2018 11:50 AM       PHYSICAL THE RLE forwards, smaller step length shuffling steps and narrow GEREMIAS. Pt needed repeated cueing and assist to advance RLE, maintain midline and weight shift to Lt side, chair follow needed for safety.  Pt seemed fearful of falling and needed reassurance that he How much difficulty does the patient currently have. ..  -   Turning over in bed (including adjusting bedclothes, sheets and blankets)?: A Little   -   Sitting down on and standing up from a chair with arms (e.g., wheelchair, bedside commode, etc.): A Lot Goal #4   Current Status  AAROM required today, In progress   Goal #5     Goal #5   Current Status     Goal #6     Goal #6  Current Status      [ST.1]         Attribution Ng    ST. 1 - Eryn Winters, PT on 6/18/2018 11:43 AM               Physical Ther to track his eyes in all directions specially to Rt side to avoid Rt side neglect. Trained pt for standing for 3-4 with RW/Min Ax2. Pt was pushing back initially. After repeated cueing, pt was able to maintain midline.  Pt was able to stand and transfers c Weight Bearing Restriction: None                PAIN ASSESSMENT   Ratin  Location:  denies pain and seemed comfortable  Management Techniques: Activity promotion; Body mechanics;Repositioning    BALANCE Goal #1   Current Status  MinMod A   Goal #2 Patient is able to demonstrate transfers Sit to/from Stand at assistance level: minimum assistance with walker - rolling      Goal #2  Current Status  Min A X2  with RW/VC    Goal #3 Patient is able to ambulate Instructed pt and pt performed both LE AAROM [id number] in seated position but needed repeated cueing and needed assist with exercises. Trained pt for supine <> sit with Concetta Ax1 with VC for proper sequencing.  Pt was able to perform sit<>stand and bed to decrease mobility, transfers, decrease safety, fearful of falling, poor ambulation tolerance due to weakness and difficulty advancing R LE, as well as need for assist for hand placement during transfers.    Pt will continue to benefit from skilled PT while Standardized Score (AM-PAC Scale): 35.33   CMS Modifier (G-Code): CL    FUNCTIONAL ABILITY STATUS  Gait Assessment   Gait Assistance:  (Minmod Ax2)  Distance (ft): 10ftx1 and 8ftX2.     Assistive Device: Rolling walker  Pattern:  (diffculty advancing RLE, f :  Vinod Bill OT (Occupational Therapist)       OCCUPATIONAL THERAPY TREATMENT NOTE - INPATIENT        Room Number: 325/325-A      Presenting Problem: left ICH    Problem List  Principal Problem:    Left-sided nontraumatic intracerebral hemorrh OBJECTIVE  Precautions: Limb alert - right    WEIGHT BEARING RESTRICTION  Weight Bearing Restriction: None                PAIN ASSESSMENT  Ratin           ACTIVITY TOLERANCE  Room air    ACTIVITIES OF DAILY LIVING ASSESSMENT  AM-PAC ‘6-Clicks’ Inpatien  Patient will tolerate standing for 4 minutes in prep for adls with min A.    Comment: Min A/Mod A x2 x3 mins      Comment:            Goals  on: 2018  Frequency: 3-5x/week    Peace Watts MOT/R  [address] cones on targets. He requires verbal and tactile cues for posture, especially when he fatigues. Pt took a seated rest break. He ambulated ~10 feet with RW with cues and assist to weight shift left and advance right leg.  Pt assisted back to chair and left w Bedroom Mobility:[EA.1] Mod A with RW and close chair follow x10 ft.[EA.2]     BALANCE ASSESSMENT  Static Sitting: SBA   Dynamic Sitting: SBA   Static Standing: min. A x2  Dynamic Standing: min-mod.  A x2 (during wt shifting)     FUNCTIONAL ADL ASSESSMENT RN Yecenia Mcdaniels cleared pt for therapy session. Pt seen in coordination with PT for part of session. Pt received in bed, agreeable to OOB activities by nodding. Pt is non-verbal, able to nod his head and would smile throughout session. Pt required mod.  A x1 for s -   Putting on and taking off regular upper body clothing?: A Lot  -   Taking care of personal grooming such as brushing teeth?: A Little  -   Eating meals?: A Little    AM-PAC Score:  Score: 14  Approx Degree of Impairment: 59.67%  Standardized Score (AM- Video Swallow Study Notes     No notes of this type exist for this encounter.          SLP Note - SLP Notes      SLP Note signed by ANILA Avitia at 6/17/2018  1:36 PM  Version 1 of 1    Author:  ANILA Avitia Service:  Winnie Patel ensure safe tolerance of diet and to reinforce all swallowing precautions/strategies to improve safety/efficiency of the swallow. CXR results to be monitored closely. Family education to be completed as available.        RECOMMENDATIONS   Diet Recommendatio pleural effusion, or pneumothorax.     OBJECTIVE   ORAL MOTOR EXAMINATION  Dentition:  (missing upper dentures)  Symmetry: Within Functional Limits  Strength:  (reduced overall bilaterally)  Tone: Within Functional Limits  Range of Motion: Within Functional Speech-Language Pathologist  [facility]  #[id number][MD.1]        Electronically signed by ANILA Berrios on 6/17/2018  1:36 PM   Attribution Ng    MD.1 - ANILA Berrios on 6/17/2018  1:20 PM Malnutrition...

## 2025-01-13 NOTE — DIETITIAN INITIAL EVALUATION ADULT - PERTINENT LABORATORY DATA
01-13    134[L]  |  101  |  38[H]  ----------------------------<  89  TNP   |  15[L]  |  1.72[H]    Ca    9.2      13 Jan 2025 05:48  Phos  TNP     01-13  Mg     2.40     01-13

## 2025-01-13 NOTE — PROGRESS NOTE ADULT - PROBLEM SELECTOR PLAN 3
Likely hypovolemic  - resolved with IV NS  - monitor BMP Likely hypovolemic  - resolved with IV NS  - repeat hemolysed BMP

## 2025-01-13 NOTE — DIETITIAN INITIAL EVALUATION ADULT - ORAL INTAKE PTA/DIET HISTORY
Patient's niece by the bedside and daughter over the phone provided diet hx given pt is asleep with lethargy. As per daughter, pt's appetite has been decreased recently. As per daughter, pt consumes regular food texture at home PTA, follows low salt diet as per niece. Daughter endorses pt has significant weight loss, reported pt's recent weight obtained 156lbs 1month ago. As per daughter pt's UBW~138-145 but over the past 6months to 1 yr, pt had beneficial weight gain.

## 2025-01-13 NOTE — PROGRESS NOTE ADULT - PROBLEM SELECTOR PLAN 6
- holding Lasix as pt not volume overloaded, and also i/s/o RACHAEL  - holding Losartan 2/2 RACHAEL - holding Lasix as pt not volume overloaded, and also i/s/o RACHAEL  - holding Losartan 2/2 RACHAEL  -check TTE as per daughter request since last TTE was in 12/2021

## 2025-01-13 NOTE — PROGRESS NOTE ADULT - PROBLEM SELECTOR PLAN 8
DVT ppx: heparin s/c  Dispo: PT recommended [facility], referrals sent on 01/10, pending acceptance and auth, f/u with SW

## 2025-01-13 NOTE — DIETITIAN INITIAL EVALUATION ADULT - NS FNS WEIGHT CHANGE REASON
As per daughter pt's UBW~138-145 but over the past 6months to 1 yr, pt had beneficial weight gain. Most recent weight obtained 1month ago, 156lbs. Most recent adm weight 147.8lbs (1/9/25). Weight trend reflects beneficial weight gain of 2-9lbs x 6m to 1 yr, and significant weight loss of 8lbs/5.12% x 1m./unintentional

## 2025-01-13 NOTE — DIETITIAN INITIAL EVALUATION ADULT - ADD RECOMMEND
1) Recommend continue with current diet, which remains appropriate at this time.   2) Encourage PO intake and honor food preferences as able. Provide feeding assistance PRN.   3) Monitor PO intake, Labs, weights, BMs, and skin integrity.   4) RD to remain available for further nutritional interventions as indicated.

## 2025-01-13 NOTE — DIETITIAN INITIAL EVALUATION ADULT - PERTINENT MEDS FT
MEDICATIONS  (STANDING):  chlorhexidine 2% Cloths 1 Application(s) Topical daily  clonazePAM  Tablet 0.5 milliGRAM(s) Oral two times a day  FLUoxetine 20 milliGRAM(s) Oral daily  heparin   Injectable 5000 Unit(s) SubCutaneous every 8 hours  influenza  Vaccine (HIGH DOSE) 0.5 milliLiter(s) IntraMuscular once  latanoprost 0.005% Ophthalmic Solution 1 Drop(s) Both EYES at bedtime  metoprolol succinate  milliGRAM(s) Oral daily  multivitamin 1 Tablet(s) Oral daily  NIFEdipine XL 60 milliGRAM(s) Oral daily  sodium chloride 0.9%. 1000 milliLiter(s) (100 mL/Hr) IV Continuous <Continuous>  sodium chloride 0.9%. 1000 milliLiter(s) (60 mL/Hr) IV Continuous <Continuous>    MEDICATIONS  (PRN):  acetaminophen     Tablet .. 650 milliGRAM(s) Oral every 6 hours PRN Temp greater or equal to 38C (100.4F), Mild Pain (1 - 3)  albuterol/ipratropium for Nebulization 3 milliLiter(s) Nebulizer every 6 hours PRN Shortness of Breath and/or Wheezing  meclizine 12.5 milliGRAM(s) Oral two times a day PRN for dizziness  melatonin 3 milliGRAM(s) Oral at bedtime PRN Insomnia

## 2025-01-13 NOTE — DIETITIAN INITIAL EVALUATION ADULT - PHYSCIAL ASSESSMENT
Unable to conduct NFPE given pt is alseep, however pt visibly observed with moderate fat loss and muscle wasting.

## 2025-01-13 NOTE — DIETITIAN INITIAL EVALUATION ADULT - OTHER INFO
Per chart review, 85 y/o Female w/ Hx dementia, COPD, CHF, HTN, anxiety, bladder ca s/p resection/chemo, breast cancer s/p lumpectomy, p/w multiple complaints including dizziness, chest pain, and dyspnea.  Found to have urinary retention in ED with labs showing hyponatremia.      Patient seen at bedside, asleep. Niece by the bedside states she takes care patient sometimes, and prefers writer to speak to pt's daughter Tori over the phone. Daughter, Tori reports Pt with poor PO intake in hospital, Niece reported pt with <25% meal completion last night. Pt s/p Swallow Eval on 1/9/25 with recommendations of soft and bite sized diet consistency. Daughter is amenable for pt to receive Hormel Shake 1x daily(520 maria teresa, 22gm pro), and Magic Cup 2x daily (580kcal, 18gm pro) to provide optimal nutrition while in hospital. Food preferences explored via daughter and pt's niece, requests honored to encourage PO intake. Pt with MVI ordered for micronutrient coverage. Encouraged family to provide small frequent meals daily to provide overall food intake, and provide liquids between meals instead of during meals. Niece and daughter verbalized understanding. RD to remain available for further nutritional interventions as indicated.

## 2025-01-13 NOTE — DIETITIAN INITIAL EVALUATION ADULT - PROBLEM/PLAN-2
Visual acuity  Right 20/10  Left 20/13  Both 20/10     Leopoldo Leventhal, AGUSTINA  03/28/21 1948 DISPLAY PLAN FREE TEXT

## 2025-01-13 NOTE — PROGRESS NOTE ADULT - PROBLEM SELECTOR PLAN 1
Cr 1.71 on admission, now improving   - s/p IVF and Hernandez placement  - kidney and bladder USG reviewed: Minimally increased renal cortical echotexture, without obstructive   uropathy, c/w medical renal disease  - monitor BMP Cr 1.71 on admission, now improving   - s/p IVF and Hernandez placement  - kidney and bladder USG reviewed: Minimally increased renal cortical echotexture, without obstructive   uropathy, c/w medical renal disease  - repeat hemolysed BMP

## 2025-01-13 NOTE — DIETITIAN INITIAL EVALUATION ADULT - ORAL NUTRITION SUPPLEMENTS
Will provide Hormel Shake 1x daily(520 maria teresa, 22gm pro), and Magic Cup 2x daily (580kcal, 18gm pro) from kitchen.

## 2025-01-13 NOTE — PROGRESS NOTE ADULT - SUBJECTIVE AND OBJECTIVE BOX
Patient is a 84y old  Female who presents with a chief complaint of Dizziness (12 Jan 2025 18:46)      SUBJECTIVE / OVERNIGHT EVENTS:    MEDICATIONS  (STANDING):  chlorhexidine 2% Cloths 1 Application(s) Topical daily  clonazePAM  Tablet 0.5 milliGRAM(s) Oral two times a day  FLUoxetine 20 milliGRAM(s) Oral daily  heparin   Injectable 5000 Unit(s) SubCutaneous every 8 hours  influenza  Vaccine (HIGH DOSE) 0.5 milliLiter(s) IntraMuscular once  latanoprost 0.005% Ophthalmic Solution 1 Drop(s) Both EYES at bedtime  metoprolol succinate  milliGRAM(s) Oral daily  multivitamin 1 Tablet(s) Oral daily  NIFEdipine XL 60 milliGRAM(s) Oral daily  sodium chloride 0.9%. 1000 milliLiter(s) (100 mL/Hr) IV Continuous <Continuous>  sodium chloride 0.9%. 1000 milliLiter(s) (60 mL/Hr) IV Continuous <Continuous>    MEDICATIONS  (PRN):  acetaminophen     Tablet .. 650 milliGRAM(s) Oral every 6 hours PRN Temp greater or equal to 38C (100.4F), Mild Pain (1 - 3)  albuterol/ipratropium for Nebulization 3 milliLiter(s) Nebulizer every 6 hours PRN Shortness of Breath and/or Wheezing  meclizine 12.5 milliGRAM(s) Oral two times a day PRN for dizziness  melatonin 3 milliGRAM(s) Oral at bedtime PRN Insomnia      Vital Signs Last 24 Hrs  T(C): 36.7 (13 Jan 2025 05:20), Max: 37 (12 Jan 2025 17:31)  T(F): 98.1 (13 Jan 2025 05:20), Max: 98.6 (12 Jan 2025 17:31)  HR: 81 (13 Jan 2025 05:20) (81 - 89)  BP: 137/74 (13 Jan 2025 05:20) (137/74 - 147/99)  BP(mean): --  RR: 17 (13 Jan 2025 05:20) (17 - 17)  SpO2: 94% (13 Jan 2025 05:20) (93% - 94%)    Parameters below as of 13 Jan 2025 05:20  Patient On (Oxygen Delivery Method): room air      CAPILLARY BLOOD GLUCOSE        I&O's Summary      PHYSICAL EXAM:  GENERAL: NAD, well-developed  HEAD:  Atraumatic, Normocephalic  EYES: EOMI, PERRLA, conjunctiva and sclera clear  NECK: Supple, No JVD  CHEST/LUNG: Clear to auscultation bilaterally; No wheeze  HEART: Regular rate and rhythm; No murmurs, rubs, or gallops  ABDOMEN: Soft, Nontender, Nondistended; Bowel sounds present  EXTREMITIES:  2+ Peripheral Pulses, No clubbing, cyanosis, or edema  PSYCH: AAOx3  NEUROLOGY: non-focal  SKIN: No rashes or lesions    LABS:                        14.8   11.54 )-----------( 324      ( 11 Jan 2025 23:08 )             42.5     01-13    134[L]  |  101  |  38[H]  ----------------------------<  89  TNP   |  15[L]  |  1.72[H]    Ca    9.2      13 Jan 2025 05:48  Phos  TNP     01-13  Mg     2.40     01-13            Urinalysis Basic - ( 13 Jan 2025 05:48 )    Color: x / Appearance: x / SG: x / pH: x  Gluc: 89 mg/dL / Ketone: x  / Bili: x / Urobili: x   Blood: x / Protein: x / Nitrite: x   Leuk Esterase: x / RBC: x / WBC x   Sq Epi: x / Non Sq Epi: x / Bacteria: x        RADIOLOGY & ADDITIONAL TESTS:    Imaging Personally Reviewed:    Consultant(s) Notes Reviewed:      Care Discussed with Consultants/Other Providers:   Patient is a 84y old  Female who presents with a chief complaint of Dizziness (12 Jan 2025 18:46)      SUBJECTIVE / OVERNIGHT EVENTS:  Patient has no new complaints. She c/o chronic dizziness as per daughter. Denies cp, SOB, abdominal pain, N/V/D     MEDICATIONS  (STANDING):  chlorhexidine 2% Cloths 1 Application(s) Topical daily  clonazePAM  Tablet 0.5 milliGRAM(s) Oral two times a day  FLUoxetine 20 milliGRAM(s) Oral daily  heparin   Injectable 5000 Unit(s) SubCutaneous every 8 hours  influenza  Vaccine (HIGH DOSE) 0.5 milliLiter(s) IntraMuscular once  latanoprost 0.005% Ophthalmic Solution 1 Drop(s) Both EYES at bedtime  metoprolol succinate  milliGRAM(s) Oral daily  multivitamin 1 Tablet(s) Oral daily  NIFEdipine XL 60 milliGRAM(s) Oral daily  sodium chloride 0.9%. 1000 milliLiter(s) (100 mL/Hr) IV Continuous <Continuous>  sodium chloride 0.9%. 1000 milliLiter(s) (60 mL/Hr) IV Continuous <Continuous>    MEDICATIONS  (PRN):  acetaminophen     Tablet .. 650 milliGRAM(s) Oral every 6 hours PRN Temp greater or equal to 38C (100.4F), Mild Pain (1 - 3)  albuterol/ipratropium for Nebulization 3 milliLiter(s) Nebulizer every 6 hours PRN Shortness of Breath and/or Wheezing  meclizine 12.5 milliGRAM(s) Oral two times a day PRN for dizziness  melatonin 3 milliGRAM(s) Oral at bedtime PRN Insomnia      Vital Signs Last 24 Hrs  T(C): 36.7 (13 Jan 2025 05:20), Max: 37 (12 Jan 2025 17:31)  T(F): 98.1 (13 Jan 2025 05:20), Max: 98.6 (12 Jan 2025 17:31)  HR: 81 (13 Jan 2025 05:20) (81 - 89)  BP: 137/74 (13 Jan 2025 05:20) (137/74 - 147/99)  BP(mean): --  RR: 17 (13 Jan 2025 05:20) (17 - 17)  SpO2: 94% (13 Jan 2025 05:20) (93% - 94%)    Parameters below as of 13 Jan 2025 05:20  Patient On (Oxygen Delivery Method): room air      CAPILLARY BLOOD GLUCOSE        I&O's Summary      PHYSICAL EXAM:  GENERAL: NAD, well-developed  HEAD:  Atraumatic, Normocephalic  EYES: EOMI, PERRLA, conjunctiva and sclera clear  NECK: Supple, No JVD  CHEST/LUNG: Clear to auscultation bilaterally; No wheeze  HEART: Regular rate and rhythm; No murmurs, rubs, or gallops  ABDOMEN: Soft, Nontender, Nondistended; Bowel sounds present  EXTREMITIES:  2+ Peripheral Pulses, No clubbing, cyanosis, or edema  PSYCH: AAOx1 dementia  NEUROLOGY: non-focal  SKIN: No rashes or lesions    LABS:                        14.8   11.54 )-----------( 324      ( 11 Jan 2025 23:08 )             42.5     01-13    134[L]  |  101  |  38[H]  ----------------------------<  89  TNP   |  15[L]  |  1.72[H]    Ca    9.2      13 Jan 2025 05:48  Phos  TNP     01-13  Mg     2.40     01-13            Urinalysis Basic - ( 13 Jan 2025 05:48 )    Color: x / Appearance: x / SG: x / pH: x  Gluc: 89 mg/dL / Ketone: x  / Bili: x / Urobili: x   Blood: x / Protein: x / Nitrite: x   Leuk Esterase: x / RBC: x / WBC x   Sq Epi: x / Non Sq Epi: x / Bacteria: x        RADIOLOGY & ADDITIONAL TESTS:    Imaging Personally Reviewed:    Consultant(s) Notes Reviewed:      Care Discussed with Consultants/Other Providers:

## 2025-01-14 ENCOUNTER — TRANSCRIPTION ENCOUNTER (OUTPATIENT)
Age: 85
End: 2025-01-14

## 2025-01-14 ENCOUNTER — RESULT REVIEW (OUTPATIENT)
Age: 85
End: 2025-01-14

## 2025-01-14 LAB
ANION GAP SERPL CALC-SCNC: 11 MMOL/L — SIGNIFICANT CHANGE UP (ref 7–14)
BUN SERPL-MCNC: 48 MG/DL — HIGH (ref 7–23)
CALCIUM SERPL-MCNC: 9.3 MG/DL — SIGNIFICANT CHANGE UP (ref 8.4–10.5)
CHLORIDE SERPL-SCNC: 103 MMOL/L — SIGNIFICANT CHANGE UP (ref 98–107)
CO2 SERPL-SCNC: 24 MMOL/L — SIGNIFICANT CHANGE UP (ref 22–31)
CREAT SERPL-MCNC: 2.04 MG/DL — HIGH (ref 0.5–1.3)
EGFR: 23 ML/MIN/1.73M2 — LOW
GLUCOSE SERPL-MCNC: 91 MG/DL — SIGNIFICANT CHANGE UP (ref 70–99)
HCT VFR BLD CALC: 40.5 % — SIGNIFICANT CHANGE UP (ref 34.5–45)
HGB BLD-MCNC: 13.6 G/DL — SIGNIFICANT CHANGE UP (ref 11.5–15.5)
MAGNESIUM SERPL-MCNC: 2.5 MG/DL — SIGNIFICANT CHANGE UP (ref 1.6–2.6)
MCHC RBC-ENTMCNC: 32.5 PG — SIGNIFICANT CHANGE UP (ref 27–34)
MCHC RBC-ENTMCNC: 33.6 G/DL — SIGNIFICANT CHANGE UP (ref 32–36)
MCV RBC AUTO: 96.7 FL — SIGNIFICANT CHANGE UP (ref 80–100)
NRBC # BLD: 0 /100 WBCS — SIGNIFICANT CHANGE UP (ref 0–0)
NRBC # FLD: 0 K/UL — SIGNIFICANT CHANGE UP (ref 0–0)
PHOSPHATE SERPL-MCNC: 3 MG/DL — SIGNIFICANT CHANGE UP (ref 2.5–4.5)
PLATELET # BLD AUTO: 332 K/UL — SIGNIFICANT CHANGE UP (ref 150–400)
POTASSIUM SERPL-MCNC: 4.5 MMOL/L — SIGNIFICANT CHANGE UP (ref 3.5–5.3)
POTASSIUM SERPL-SCNC: 4.5 MMOL/L — SIGNIFICANT CHANGE UP (ref 3.5–5.3)
RBC # BLD: 4.19 M/UL — SIGNIFICANT CHANGE UP (ref 3.8–5.2)
RBC # FLD: 12.8 % — SIGNIFICANT CHANGE UP (ref 10.3–14.5)
SODIUM SERPL-SCNC: 138 MMOL/L — SIGNIFICANT CHANGE UP (ref 135–145)
WBC # BLD: 13.92 K/UL — HIGH (ref 3.8–10.5)
WBC # FLD AUTO: 13.92 K/UL — HIGH (ref 3.8–10.5)

## 2025-01-14 PROCEDURE — 93306 TTE W/DOPPLER COMPLETE: CPT | Mod: 26

## 2025-01-14 PROCEDURE — 99233 SBSQ HOSP IP/OBS HIGH 50: CPT

## 2025-01-14 RX ORDER — SODIUM CHLORIDE 9 MG/ML
2000 INJECTION, SOLUTION INTRAMUSCULAR; INTRAVENOUS; SUBCUTANEOUS
Refills: 0 | Status: DISCONTINUED | OUTPATIENT
Start: 2025-01-14 | End: 2025-01-15

## 2025-01-14 RX ORDER — PANTOPRAZOLE 40 MG/1
1 TABLET, DELAYED RELEASE ORAL
Qty: 0 | Refills: 0 | DISCHARGE
Start: 2025-01-14

## 2025-01-14 RX ADMIN — Medication 0.5 MILLIGRAM(S): at 06:09

## 2025-01-14 RX ADMIN — Medication 1 TABLET(S): at 12:07

## 2025-01-14 RX ADMIN — SODIUM CHLORIDE 75 MILLILITER(S): 9 INJECTION, SOLUTION INTRAMUSCULAR; INTRAVENOUS; SUBCUTANEOUS at 17:33

## 2025-01-14 RX ADMIN — HEPARIN SODIUM 5000 UNIT(S): 1000 INJECTION, SOLUTION INTRAVENOUS; SUBCUTANEOUS at 06:10

## 2025-01-14 RX ADMIN — Medication 0.5 MILLIGRAM(S): at 17:50

## 2025-01-14 RX ADMIN — HEPARIN SODIUM 5000 UNIT(S): 1000 INJECTION, SOLUTION INTRAVENOUS; SUBCUTANEOUS at 13:54

## 2025-01-14 RX ADMIN — Medication 200 MILLIGRAM(S): at 06:10

## 2025-01-14 RX ADMIN — LATANOPROST 1 DROP(S): 50 SOLUTION OPHTHALMIC at 23:00

## 2025-01-14 RX ADMIN — Medication 20 MILLIGRAM(S): at 12:09

## 2025-01-14 RX ADMIN — PANTOPRAZOLE 40 MILLIGRAM(S): 40 TABLET, DELAYED RELEASE ORAL at 06:10

## 2025-01-14 RX ADMIN — NIFEDIPINE 60 MILLIGRAM(S): 60 TABLET, EXTENDED RELEASE ORAL at 06:10

## 2025-01-14 RX ADMIN — HEPARIN SODIUM 5000 UNIT(S): 1000 INJECTION, SOLUTION INTRAVENOUS; SUBCUTANEOUS at 23:01

## 2025-01-14 RX ADMIN — CHLORHEXIDINE GLUCONATE 1 APPLICATION(S): 1.2 RINSE ORAL at 12:07

## 2025-01-14 NOTE — PROGRESS NOTE ADULT - PROBLEM SELECTOR PLAN 8
DVT ppx: heparin s/c  Dispo: PT recommended [facility], patient had a bed but cancelled today due to RACHAEL. Will reassess in a.m. DVT ppx: heparin s/c  Dispo: PT recommended [facility], patient had a bed but cancelled today due to RACHAEL. Will reassess in a.m.    Spoke to Daughter Tori @ [phone] on 1/14 and aware

## 2025-01-14 NOTE — DISCHARGE NOTE NURSING/CASE MANAGEMENT/SOCIAL WORK - NSDCPEFALRISK_GEN_ALL_CORE
For information on Fall & Injury Prevention, visit: [email or web address] OR  [email or web address] OR  https://www.cdc.gov/steadi/patient.html

## 2025-01-14 NOTE — PROGRESS NOTE ADULT - PROBLEM SELECTOR PLAN 6
- holding Lasix as pt not volume overloaded, and also i/s/o RACHAEL  - holding Losartan 2/2 RACHAEL  -TTE showed mild grade 1 diastolic dysfxn with EF 75%

## 2025-01-14 NOTE — DISCHARGE NOTE NURSING/CASE MANAGEMENT/SOCIAL WORK - NSCORESITESY/N_GEN_A_CORE_RD
No SOB on exertion.  Normal SpO2 on Room Air, No WOB, No SOB at rest.  CTA Chest Negative for PE    -TTE LVEF 51%, Grade 1 Diastolic dysfunction

## 2025-01-14 NOTE — PROGRESS NOTE ADULT - SUBJECTIVE AND OBJECTIVE BOX
Patient is a 84y old  Female who presents with a chief complaint of Dizziness (13 Jan 2025 11:13)      SUBJECTIVE / OVERNIGHT EVENTS:  Patient with poor PO intake as per aide at bedside. Otherwise no new complaints. She c/o chronic dizziness for years.     MEDICATIONS  (STANDING):  chlorhexidine 2% Cloths 1 Application(s) Topical daily  clonazePAM  Tablet 0.5 milliGRAM(s) Oral two times a day  FLUoxetine 20 milliGRAM(s) Oral daily  heparin   Injectable 5000 Unit(s) SubCutaneous every 8 hours  influenza  Vaccine (HIGH DOSE) 0.5 milliLiter(s) IntraMuscular once  latanoprost 0.005% Ophthalmic Solution 1 Drop(s) Both EYES at bedtime  metoprolol succinate  milliGRAM(s) Oral daily  multivitamin 1 Tablet(s) Oral daily  NIFEdipine XL 60 milliGRAM(s) Oral daily  pantoprazole    Tablet 40 milliGRAM(s) Oral before breakfast  sodium chloride 0.9%. 1000 milliLiter(s) (100 mL/Hr) IV Continuous <Continuous>  sodium chloride 0.9%. 1000 milliLiter(s) (60 mL/Hr) IV Continuous <Continuous>    MEDICATIONS  (PRN):  acetaminophen     Tablet .. 650 milliGRAM(s) Oral every 6 hours PRN Temp greater or equal to 38C (100.4F), Mild Pain (1 - 3)  albuterol/ipratropium for Nebulization 3 milliLiter(s) Nebulizer every 6 hours PRN Shortness of Breath and/or Wheezing  meclizine 12.5 milliGRAM(s) Oral two times a day PRN for dizziness  melatonin 3 milliGRAM(s) Oral at bedtime PRN Insomnia      Vital Signs Last 24 Hrs  T(C): 36.9 (14 Jan 2025 10:25), Max: 36.9 (14 Jan 2025 10:25)  T(F): 98.5 (14 Jan 2025 10:25), Max: 98.5 (14 Jan 2025 10:25)  HR: 81 (14 Jan 2025 10:25) (81 - 92)  BP: 140/65 (14 Jan 2025 10:25) (133/64 - 140/65)  BP(mean): --  RR: 17 (14 Jan 2025 10:25) (17 - 18)  SpO2: 93% (14 Jan 2025 10:25) (93% - 98%)    Parameters below as of 14 Jan 2025 10:25  Patient On (Oxygen Delivery Method): room air      CAPILLARY BLOOD GLUCOSE        I&O's Summary      PHYSICAL EXAM:   GENERAL: NAD  HEAD:  Atraumatic, Normocephalic  EYES: EOMI, PERRLA, conjunctiva and sclera clear  NECK: Supple, No JVD  CHEST/LUNG: Clear to auscultation bilaterally; No wheeze  HEART: Regular rate and rhythm; No murmurs, rubs, or gallops  ABDOMEN: Soft, Nontender, Nondistended; Bowel sounds present  EXTREMITIES:  2+ Peripheral Pulses, No clubbing, cyanosis, or edema  PSYCH: AAOx1 dementia  NEUROLOGY: non-focal  SKIN: No rashes or lesions    LABS:                        13.6   13.92 )-----------( 332      ( 14 Jan 2025 06:00 )             40.5     01-14    138  |  103  |  48[H]  ----------------------------<  91  4.5   |  24  |  2.04[H]    Ca    9.3      14 Jan 2025 06:00  Phos  3.0     01-14  Mg     2.50     01-14            Urinalysis Basic - ( 14 Jan 2025 06:00 )    Color: x / Appearance: x / SG: x / pH: x  Gluc: 91 mg/dL / Ketone: x  / Bili: x / Urobili: x   Blood: x / Protein: x / Nitrite: x   Leuk Esterase: x / RBC: x / WBC x   Sq Epi: x / Non Sq Epi: x / Bacteria: x        RADIOLOGY & ADDITIONAL TESTS:    Imaging Personally Reviewed: < from: TTE W or WO Ultrasound Enhancing Agent (01.14.25 @ 13:00) >  CONCLUSIONS:      1. Left ventricular cavity is normal in size. Left ventricular wall thickness is normal. Left ventricular systolic function is hyperdynamic with an ejection fraction of 75 % by Alcantar's method of disks. There are no regional wall motion abnormalities seen.   2. There is mild (grade 1) left ventricular diastolic dysfunction.   3. Normal right ventricular cavity size and probably normal right ventricular systolic function. Tricuspid annular plane systolic excursion (TAPSE) is 1.9 cm (normal >=1.7 cm).   4. Structurally normal mitral valve with normal leaflet excursion. There is calcification of the mitral valve annulus. There is trace mitral regurgitation.    < end of copied text >      Consultant(s) Notes Reviewed:      Care Discussed with Consultants/Other Providers:

## 2025-01-14 NOTE — DISCHARGE NOTE NURSING/CASE MANAGEMENT/SOCIAL WORK - PATIENT PORTAL LINK FT
You can access the FollowMyHealth Patient Portal offered by [facility] by registering at the following website: [email or web address]. By joining [facility]’s FollowMyHealth portal, you will also be able to view your health information using other applications (apps) compatible with our system.

## 2025-01-14 NOTE — DISCHARGE NOTE NURSING/CASE MANAGEMENT/SOCIAL WORK - FINANCIAL ASSISTANCE
[facility] provides services at a reduced cost to those who are determined to be eligible through [facility]’s financial assistance program. Information regarding [facility]’s financial assistance program can be found by going to [email or web address] or by calling [phone].

## 2025-01-14 NOTE — PROGRESS NOTE ADULT - PROBLEM SELECTOR PLAN 1
Cr 1.71 on admission, was improving  now increased 1.91 -> 2.04  - s/p IVF and Hernandez placement  - kidney and bladder USG reviewed: Minimally increased renal cortical echotexture, without obstructive   uropathy, c/w medical renal disease  - restarted NS @ 75ml/hr  -repeat BMP in a.m.

## 2025-01-15 VITALS
SYSTOLIC BLOOD PRESSURE: 150 MMHG | OXYGEN SATURATION: 96 % | DIASTOLIC BLOOD PRESSURE: 85 MMHG | RESPIRATION RATE: 17 BRPM

## 2025-01-15 LAB
ANION GAP SERPL CALC-SCNC: 15 MMOL/L — HIGH (ref 7–14)
BUN SERPL-MCNC: 52 MG/DL — HIGH (ref 7–23)
CALCIUM SERPL-MCNC: 9 MG/DL — SIGNIFICANT CHANGE UP (ref 8.4–10.5)
CHLORIDE SERPL-SCNC: 105 MMOL/L — SIGNIFICANT CHANGE UP (ref 98–107)
CO2 SERPL-SCNC: 20 MMOL/L — LOW (ref 22–31)
CREAT SERPL-MCNC: 1.67 MG/DL — HIGH (ref 0.5–1.3)
EGFR: 30 ML/MIN/1.73M2 — LOW
GLUCOSE SERPL-MCNC: 75 MG/DL — SIGNIFICANT CHANGE UP (ref 70–99)
HCT VFR BLD CALC: 39.5 % — SIGNIFICANT CHANGE UP (ref 34.5–45)
HGB BLD-MCNC: 13.1 G/DL — SIGNIFICANT CHANGE UP (ref 11.5–15.5)
MAGNESIUM SERPL-MCNC: 2.4 MG/DL — SIGNIFICANT CHANGE UP (ref 1.6–2.6)
MCHC RBC-ENTMCNC: 32.3 PG — SIGNIFICANT CHANGE UP (ref 27–34)
MCHC RBC-ENTMCNC: 33.2 G/DL — SIGNIFICANT CHANGE UP (ref 32–36)
MCV RBC AUTO: 97.5 FL — SIGNIFICANT CHANGE UP (ref 80–100)
NRBC # BLD: 0 /100 WBCS — SIGNIFICANT CHANGE UP (ref 0–0)
NRBC # FLD: 0 K/UL — SIGNIFICANT CHANGE UP (ref 0–0)
PHOSPHATE SERPL-MCNC: 2.8 MG/DL — SIGNIFICANT CHANGE UP (ref 2.5–4.5)
PLATELET # BLD AUTO: 319 K/UL — SIGNIFICANT CHANGE UP (ref 150–400)
POTASSIUM SERPL-MCNC: 4.3 MMOL/L — SIGNIFICANT CHANGE UP (ref 3.5–5.3)
POTASSIUM SERPL-SCNC: 4.3 MMOL/L — SIGNIFICANT CHANGE UP (ref 3.5–5.3)
RBC # BLD: 4.05 M/UL — SIGNIFICANT CHANGE UP (ref 3.8–5.2)
RBC # FLD: 12.8 % — SIGNIFICANT CHANGE UP (ref 10.3–14.5)
SODIUM SERPL-SCNC: 140 MMOL/L — SIGNIFICANT CHANGE UP (ref 135–145)
WBC # BLD: 11.35 K/UL — HIGH (ref 3.8–10.5)
WBC # FLD AUTO: 11.35 K/UL — HIGH (ref 3.8–10.5)

## 2025-01-15 PROCEDURE — 99239 HOSP IP/OBS DSCHRG MGMT >30: CPT

## 2025-01-15 RX ADMIN — Medication 1 TABLET(S): at 12:44

## 2025-01-15 RX ADMIN — NIFEDIPINE 60 MILLIGRAM(S): 60 TABLET, EXTENDED RELEASE ORAL at 05:16

## 2025-01-15 RX ADMIN — PANTOPRAZOLE 40 MILLIGRAM(S): 40 TABLET, DELAYED RELEASE ORAL at 05:17

## 2025-01-15 RX ADMIN — CHLORHEXIDINE GLUCONATE 1 APPLICATION(S): 1.2 RINSE ORAL at 12:45

## 2025-01-15 RX ADMIN — HEPARIN SODIUM 5000 UNIT(S): 1000 INJECTION, SOLUTION INTRAVENOUS; SUBCUTANEOUS at 05:19

## 2025-01-15 RX ADMIN — Medication 0.5 MILLIGRAM(S): at 05:19

## 2025-01-15 RX ADMIN — Medication 20 MILLIGRAM(S): at 12:44

## 2025-01-15 RX ADMIN — Medication 200 MILLIGRAM(S): at 05:17

## 2025-01-15 NOTE — PROGRESS NOTE ADULT - PROBLEM SELECTOR PROBLEM 5
Chronic obstructive pulmonary disease

## 2025-01-15 NOTE — PROGRESS NOTE ADULT - PROBLEM SELECTOR PROBLEM 6
Chronic diastolic congestive heart failure

## 2025-01-15 NOTE — PROGRESS NOTE ADULT - ASSESSMENT
85 y/o Female w/ Hx dementia, COPD, CHF, HTN, anxiety, bladder ca s/p resection/chemo, breast cancer s/p lumpectomy, p/w multiple complaints including dizziness, chest pain, and dyspnea.  Found to have urinary retention in ED with labs showing hyponatremia.  
83 y/o F with pmh of dementia, COPD, CHF, HTN, anxiety, bladder ca s/p resection/chemo, breast cancer s/p lumpectomy, p/w multiple complaints including dizziness, chest pain, and dyspnea.  Found to have urinary retention in ED with labs showing hyponatremia.  
83 y/o Female w/ Hx dementia, COPD, CHF, HTN, anxiety, bladder ca s/p resection/chemo, breast cancer s/p lumpectomy, p/w multiple complaints including dizziness, chest pain, and dyspnea.  Found to have urinary retention in ED with labs showing hyponatremia.  
83 y/o F with pmh of dementia, COPD, CHF, HTN, anxiety, bladder ca s/p resection/chemo, breast cancer s/p lumpectomy, p/w multiple complaints including dizziness, chest pain, and dyspnea.  Found to have urinary retention in ED with labs showing hyponatremia.  
83 y/o F with pmh of dementia, COPD, CHF, HTN, anxiety, bladder ca s/p resection/chemo, breast cancer s/p lumpectomy, p/w multiple complaints including dizziness, chest pain, and dyspnea.  Found to have urinary retention in ED with labs showing hyponatremia.  
85 y/o Female w/ Hx dementia, COPD, CHF, HTN, anxiety, bladder ca s/p resection/chemo, breast cancer s/p lumpectomy, p/w multiple complaints including dizziness, chest pain, and dyspnea.  Found to have urinary retention in ED with labs showing hyponatremia.  
85 y/o F with pmh of dementia, COPD, CHF, HTN, anxiety, bladder ca s/p resection/chemo, breast cancer s/p lumpectomy, p/w multiple complaints including dizziness, chest pain, and dyspnea.  Found to have urinary retention in ED with labs showing hyponatremia.  
85 y/o F with pmh of dementia, COPD, CHF, HTN, anxiety, bladder ca s/p resection/chemo, breast cancer s/p lumpectomy, p/w multiple complaints including dizziness, chest pain, and dyspnea.  Found to have urinary retention in ED with labs showing hyponatremia.  
83 y/o F with pmh of dementia, COPD, CHF, HTN, anxiety, bladder ca s/p resection/chemo, breast cancer s/p lumpectomy, p/w multiple complaints including dizziness, chest pain, and dyspnea.  Found to have urinary retention in ED with labs showing hyponatremia.

## 2025-01-15 NOTE — PROGRESS NOTE ADULT - TIME BILLING
- Ordering, reviewing, and interpreting labs, testing, and imaging.  - Independently obtaining a review of systems and performing a physical exam  - Reviewing consultant documentation/recommendations in addition to discussing plan of care with consultants.  - Counselling and educating patient and family regarding interpretation of aforementioned items and plan of care.
- Ordering, reviewing, and interpreting labs, testing, and imaging.  - Independently obtaining a review of systems and performing a physical exam  - Reviewing consultant documentation/recommendations in addition to discussing plan of care with consultants.  - Counselling and educating patient and family regarding interpretation of aforementioned items and plan of care.
Reviewed lab data, radiology results, consultants' recommendations, documentation in [place], discussed with family, ACP, interdisciplinary staff and/or intervention were performed.
- Ordering, reviewing, and interpreting labs, testing, and imaging.  - Independently obtaining a review of systems and performing a physical exam  - Reviewing consultant documentation/recommendations in addition to discussing plan of care with consultants.  - Counselling and educating patient and family regarding interpretation of aforementioned items and plan of care.
Reviewed lab data, radiology results, consultants' recommendations, documentation in [place], discussed with family, ACP, interdisciplinary staff and/or intervention were performed.
- Ordering, reviewing, and interpreting labs, testing, and imaging.  - Independently obtaining a review of systems and performing a physical exam  - Reviewing consultant documentation/recommendations in addition to discussing plan of care with consultants.  - Counselling and educating patient and family regarding interpretation of aforementioned items and plan of care.
Reviewed lab data, radiology results, consultants' recommendations, documentation in [place], discussed with family, ACP, interdisciplinary staff and/or intervention were performed.

## 2025-01-15 NOTE — PROGRESS NOTE ADULT - PROBLEM SELECTOR PLAN 5
Currently breathing comfortably without wheezing.   - Duoneb PRN

## 2025-01-15 NOTE — PROGRESS NOTE ADULT - NUTRITIONAL ASSESSMENT
This patient has been assessed with a concern for Malnutrition and has been determined to have a diagnosis/diagnoses of Severe protein-calorie malnutrition.    This patient is being managed with:   Diet Soft and Bite Sized-  Entered: Jan 7 2025  9:25AM  
This patient has been assessed with a concern for Malnutrition and has been determined to have a diagnosis/diagnoses of Severe protein-calorie malnutrition.    This patient is being managed with:   Diet Soft and Bite Sized-  Entered: Jan 7 2025  9:25AM

## 2025-01-15 NOTE — PROGRESS NOTE ADULT - PROBLEM SELECTOR PLAN 8
DVT ppx: heparin s/c  Dispo: PT recommended [facility] so D/C to [facility] today    Spoke to Daughter Tori @ [phone] on 1/15 and aware of discharge

## 2025-01-15 NOTE — PROGRESS NOTE ADULT - PROBLEM SELECTOR PROBLEM 4
Acute delirium

## 2025-01-15 NOTE — PROGRESS NOTE ADULT - PROBLEM SELECTOR PLAN 4
Psych recs appreciated:  - No indication for a psychiatric 1:1  - Check TSH, vitamin b12 level, folate level; UA/UCx is negative  - Resume home Fluoxetine and Klonopin  - Frequent orientation  - Avoid additional bzd, anticholinergics, and antihistamines  - ONLY FOR COMBATIVE BEHAVIORS IF QTC <500, Haldol 0.5mg q 6hrs prn IM/IV/PO
Psych recs appreciated:  - No indication for a psychiatric 1:1  - vitamin b12 and folate levels WNL  - TSH normal  - UA/UCx is negative  - Resumed home Fluoxetine and Klonopin  - Frequent orientation  - Avoid additional bzd, anticholinergics, and antihistamines  - ONLY FOR COMBATIVE BEHAVIORS IF QTC <500, Haldol 0.5mg q 6hrs prn IM/IV/PO
Psych recs appreciated:  - No indication for a psychiatric 1:1  - F/u TSH  - vitamin b12 and folate levels WNL  - UA/UCx is negative  - Resume home Fluoxetine and Klonopin  - Frequent orientation  - Avoid additional bzd, anticholinergics, and antihistamines  - ONLY FOR COMBATIVE BEHAVIORS IF QTC <500, Haldol 0.5mg q 6hrs prn IM/IV/PO
Psych recs appreciated:  - No indication for a psychiatric 1:1  - F/u TSH  - vitamin b12 and folate levels WNL  - UA/UCx is negative  - Resumed home Fluoxetine and Klonopin  - Frequent orientation  - Avoid additional bzd, anticholinergics, and antihistamines  - ONLY FOR COMBATIVE BEHAVIORS IF QTC <500, Haldol 0.5mg q 6hrs prn IM/IV/PO
Psych recs appreciated:  - No indication for a psychiatric 1:1  - vitamin b12 and folate levels WNL  - TSH normal  - UA/UCx is negative  - Resumed home Fluoxetine and Klonopin  - Frequent orientation  - Avoid additional bzd, anticholinergics, and antihistamines  - ONLY FOR COMBATIVE BEHAVIORS IF QTC <500, Haldol 0.5mg q 6hrs prn IM/IV/PO
Psych recs appreciated:  - No indication for a psychiatric 1:1  - vitamin b12 and folate levels WNL  - UA/UCx is negative  - Resumed home Fluoxetine and Klonopin  - Frequent orientation  - Avoid additional bzd, anticholinergics, and antihistamines  - ONLY FOR COMBATIVE BEHAVIORS IF QTC <500, Haldol 0.5mg q 6hrs prn IM/IV/PO
Psych recs appreciated:  - No indication for a psychiatric 1:1  - vitamin b12 and folate levels WNL  - UA/UCx is negative  - Resumed home Fluoxetine and Klonopin  - Frequent orientation  - Avoid additional bzd, anticholinergics, and antihistamines  - ONLY FOR COMBATIVE BEHAVIORS IF QTC <500, Haldol 0.5mg q 6hrs prn IM/IV/PO
Psych recs appreciated:  - No indication for a psychiatric 1:1  - vitamin b12 and folate levels WNL  - TSH normal  - UA/UCx is negative  - Resumed home Fluoxetine and Klonopin  - Frequent orientation  - Avoid additional bzd, anticholinergics, and antihistamines  - ONLY FOR COMBATIVE BEHAVIORS IF QTC <500, Haldol 0.5mg q 6hrs prn IM/IV/PO
Psych recs appreciated:  - No indication for a psychiatric 1:1  - vitamin b12 and folate levels WNL  - TSH normal  - UA/UCx is negative  - Resumed home Fluoxetine and Klonopin  - Frequent orientation  - Avoid additional bzd, anticholinergics, and antihistamines  - ONLY FOR COMBATIVE BEHAVIORS IF QTC <500, Haldol 0.5mg q 6hrs prn IM/IV/PO

## 2025-01-15 NOTE — PROGRESS NOTE ADULT - PROBLEM SELECTOR PLAN 7
- continue nifedipine and toprol XL

## 2025-01-15 NOTE — PROGRESS NOTE ADULT - PROBLEM SELECTOR PROBLEM 1
RACHAEL (acute kidney injury)
Hyponatremia
RACHAEL (acute kidney injury)
Hyponatremia
RACHAEL (acute kidney injury)
RACHAEL (acute kidney injury)

## 2025-01-15 NOTE — PROGRESS NOTE ADULT - PROBLEM SELECTOR PLAN 1
Cr 1.71 on admission, was improving; 1.91 -> 2.04 -> 1.67  - s/p IVF and Hernandez placement  - kidney and bladder USG reviewed: Minimally increased renal cortical echotexture, without obstructive   uropathy, c/w medical renal disease  - restarted NS @ 75ml/hr on 1/14 with improvement in creat so patient still not drinking enough to stay hydrated so educated patient and daughter to encourage better water intake.

## 2025-01-15 NOTE — PROGRESS NOTE ADULT - PROBLEM SELECTOR PLAN 2
- s/p aguayo catheter placement  - Will do a voiding trial tonight now that Cr is improving
- s/p aguayo catheter placement  - s/p voiding trial 01/09 PM, f/u bladder scans
- s/p aguayo catheter placement  - s/p voiding trial 01/09 midnight, f/u bladder scans
- s/p aguayo catheter placement  - s/p voiding trial 01/09 midnight, f/u bladder scans
unclear if obstructive uropathy vs prerenal RACHAEL 2/2 dehydration  - s/p IVF and Hernandez placement  - obtain kidney and bladder USG r/o hydronephrosis   - monitor BMP daily
- s/p aguayo catheter placement  - s/p voiding trial 01/09 midnight, f/u bladder scans
- s/p aguayo catheter placement  - s/p voiding trial 01/09 PM, f/u bladder scans
unclear if obstructive uropathy vs prerenal RACHAEL 2/2 dehydration  - now improving s/p IVF and Hernandez placement  - monitor BMP daily
- s/p aguayo catheter placement  - s/p voiding trial 01/09 midnight, f/u bladder scans

## 2025-01-15 NOTE — PROGRESS NOTE ADULT - PROBLEM SELECTOR PROBLEM 2
Urinary retention
Urinary retention
RACHAEL (acute kidney injury)
Urinary retention
RACHAEL (acute kidney injury)

## 2025-01-15 NOTE — PROGRESS NOTE ADULT - PROBLEM SELECTOR PROBLEM 3
Hyponatremia
Urinary retention
Hyponatremia
Urinary retention
Hyponatremia

## 2025-01-15 NOTE — PROGRESS NOTE ADULT - SUBJECTIVE AND OBJECTIVE BOX
Patient is a 84y old  Female who presents with a chief complaint of Dizziness (14 Jan 2025 16:10)      SUBJECTIVE / OVERNIGHT EVENTS:  Patient has no new complaints. Denies cp, SOB, abdominal pain, N/V/D     MEDICATIONS  (STANDING):  chlorhexidine 2% Cloths 1 Application(s) Topical daily  clonazePAM  Tablet 0.5 milliGRAM(s) Oral two times a day  FLUoxetine 20 milliGRAM(s) Oral daily  heparin   Injectable 5000 Unit(s) SubCutaneous every 8 hours  influenza  Vaccine (HIGH DOSE) 0.5 milliLiter(s) IntraMuscular once  latanoprost 0.005% Ophthalmic Solution 1 Drop(s) Both EYES at bedtime  metoprolol succinate  milliGRAM(s) Oral daily  multivitamin 1 Tablet(s) Oral daily  NIFEdipine XL 60 milliGRAM(s) Oral daily  pantoprazole    Tablet 40 milliGRAM(s) Oral before breakfast  sodium chloride 0.9%. 1000 milliLiter(s) (100 mL/Hr) IV Continuous <Continuous>  sodium chloride 0.9%. 2000 milliLiter(s) (75 mL/Hr) IV Continuous <Continuous>  sodium chloride 0.9%. 1000 milliLiter(s) (60 mL/Hr) IV Continuous <Continuous>    MEDICATIONS  (PRN):  acetaminophen     Tablet .. 650 milliGRAM(s) Oral every 6 hours PRN Temp greater or equal to 38C (100.4F), Mild Pain (1 - 3)  albuterol/ipratropium for Nebulization 3 milliLiter(s) Nebulizer every 6 hours PRN Shortness of Breath and/or Wheezing  meclizine 12.5 milliGRAM(s) Oral two times a day PRN for dizziness  melatonin 3 milliGRAM(s) Oral at bedtime PRN Insomnia      Vital Signs Last 24 Hrs  T(C): 37.2 (15 Jaswant 2025 04:51), Max: 37.2 (15 Jaswant 2025 04:51)  T(F): 98.9 (15 Jaswant 2025 04:51), Max: 98.9 (15 Jaswant 2025 04:51)  HR: 88 (15 Jaswant 2025 04:51) (82 - 90)  BP: 150/85 (15 Jaswant 2025 11:24) (135/78 - 153/71)  BP(mean): --  RR: 17 (15 Jaswant 2025 11:24) (17 - 18)  SpO2: 96% (15 Jaswant 2025 11:24) (94% - 100%)    Parameters below as of 15 Jaswant 2025 11:24  Patient On (Oxygen Delivery Method): room air      CAPILLARY BLOOD GLUCOSE        I&O's Summary      PHYSICAL EXAM:   GENERAL: NAD, well-developed  HEAD:  Atraumatic, Normocephalic  EYES: EOMI, PERRLA, conjunctiva and sclera clear  NECK: Supple, No JVD  CHEST/LUNG: Clear to auscultation bilaterally; No wheeze  HEART: Regular rate and rhythm; No murmurs, rubs, or gallops  ABDOMEN: Soft, Nontender, Nondistended; Bowel sounds present  EXTREMITIES:  2+ Peripheral Pulses, No clubbing, cyanosis, or edema  PSYCH: AAOx1 dementia  NEUROLOGY: non-focal  SKIN: No rashes or lesions    LABS:                        13.1   11.35 )-----------( 319      ( 15 Jaswant 2025 05:00 )             39.5     01-15    140  |  105  |  52[H]  ----------------------------<  75  4.3   |  20[L]  |  1.67[H]    Ca    9.0      15 Jaswant 2025 05:00  Phos  2.8     01-15  Mg     2.40     01-15            Urinalysis Basic - ( 15 Jaswant 2025 05:00 )    Color: x / Appearance: x / SG: x / pH: x  Gluc: 75 mg/dL / Ketone: x  / Bili: x / Urobili: x   Blood: x / Protein: x / Nitrite: x   Leuk Esterase: x / RBC: x / WBC x   Sq Epi: x / Non Sq Epi: x / Bacteria: x        RADIOLOGY & ADDITIONAL TESTS:    Imaging Personally Reviewed:    Consultant(s) Notes Reviewed:      Care Discussed with Consultants/Other Providers:

## 2025-01-17 ENCOUNTER — NON-APPOINTMENT (OUTPATIENT)
Age: 85
End: 2025-01-17

## 2025-01-17 ENCOUNTER — TRANSCRIPTION ENCOUNTER (OUTPATIENT)
Age: 85
End: 2025-01-17

## 2025-01-17 DIAGNOSIS — E87.1 HYPO-OSMOLALITY AND HYPONATREMIA: ICD-10-CM

## 2025-01-17 DIAGNOSIS — R39.9 UNSPECIFIED SYMPTOMS AND SIGNS INVOLVING THE GENITOURINARY SYSTEM: ICD-10-CM

## 2025-01-17 RX ORDER — SULFAMETHOXAZOLE AND TRIMETHOPRIM 800; 160 MG/1; MG/1
800-160 TABLET ORAL
Qty: 14 | Refills: 0 | Status: ACTIVE | COMMUNITY
Start: 2025-01-17 | End: 1900-01-01

## 2025-01-21 ENCOUNTER — LABORATORY RESULT (OUTPATIENT)
Age: 85
End: 2025-01-21

## 2025-01-22 ENCOUNTER — NON-APPOINTMENT (OUTPATIENT)
Age: 85
End: 2025-01-22

## 2025-01-27 ENCOUNTER — NON-APPOINTMENT (OUTPATIENT)
Age: 85
End: 2025-01-27

## 2025-01-28 ENCOUNTER — TRANSCRIPTION ENCOUNTER (OUTPATIENT)
Age: 85
End: 2025-01-28

## 2025-01-29 ENCOUNTER — TRANSCRIPTION ENCOUNTER (OUTPATIENT)
Age: 85
End: 2025-01-29

## 2025-01-29 ENCOUNTER — EMERGENCY (EMERGENCY)
Facility: HOSPITAL | Age: 85
LOS: 1 days | Discharge: HOME | End: 2025-01-29
Attending: STUDENT IN AN ORGANIZED HEALTH CARE EDUCATION/TRAINING PROGRAM | Admitting: STUDENT IN AN ORGANIZED HEALTH CARE EDUCATION/TRAINING PROGRAM
Payer: MEDICARE

## 2025-01-29 VITALS
DIASTOLIC BLOOD PRESSURE: 66 MMHG | HEIGHT: 65 IN | HEART RATE: 74 BPM | SYSTOLIC BLOOD PRESSURE: 130 MMHG | OXYGEN SATURATION: 99 % | TEMPERATURE: 98 F | WEIGHT: 156.97 LBS | RESPIRATION RATE: 18 BRPM

## 2025-01-29 VITALS
DIASTOLIC BLOOD PRESSURE: 86 MMHG | OXYGEN SATURATION: 100 % | HEART RATE: 75 BPM | SYSTOLIC BLOOD PRESSURE: 136 MMHG | RESPIRATION RATE: 17 BRPM

## 2025-01-29 DIAGNOSIS — Z98.890 OTHER SPECIFIED POSTPROCEDURAL STATES: Chronic | ICD-10-CM

## 2025-01-29 DIAGNOSIS — Z90.3 ACQUIRED ABSENCE OF STOMACH [PART OF]: Chronic | ICD-10-CM

## 2025-01-29 LAB
ALBUMIN SERPL ELPH-MCNC: 3.5 G/DL — SIGNIFICANT CHANGE UP (ref 3.3–5)
ALP SERPL-CCNC: 40 U/L — SIGNIFICANT CHANGE UP (ref 40–120)
ALT FLD-CCNC: 56 U/L — HIGH (ref 4–33)
ANION GAP SERPL CALC-SCNC: 11 MMOL/L — SIGNIFICANT CHANGE UP (ref 7–14)
APPEARANCE UR: CLEAR — SIGNIFICANT CHANGE UP
AST SERPL-CCNC: 67 U/L — HIGH (ref 4–32)
BACTERIA # UR AUTO: NEGATIVE /HPF — SIGNIFICANT CHANGE UP
BASOPHILS # BLD AUTO: 0.02 K/UL — SIGNIFICANT CHANGE UP (ref 0–0.2)
BASOPHILS NFR BLD AUTO: 0.2 % — SIGNIFICANT CHANGE UP (ref 0–2)
BILIRUB SERPL-MCNC: 0.4 MG/DL — SIGNIFICANT CHANGE UP (ref 0.2–1.2)
BILIRUB UR-MCNC: NEGATIVE — SIGNIFICANT CHANGE UP
BUN SERPL-MCNC: 26 MG/DL — HIGH (ref 7–23)
CALCIUM SERPL-MCNC: 9.2 MG/DL — SIGNIFICANT CHANGE UP (ref 8.4–10.5)
CAST: 4 /LPF — SIGNIFICANT CHANGE UP (ref 0–4)
CHLORIDE SERPL-SCNC: 98 MMOL/L — SIGNIFICANT CHANGE UP (ref 98–107)
CO2 SERPL-SCNC: 21 MMOL/L — LOW (ref 22–31)
COLOR SPEC: YELLOW — SIGNIFICANT CHANGE UP
CREAT SERPL-MCNC: 1.73 MG/DL — HIGH (ref 0.5–1.3)
DIFF PNL FLD: NEGATIVE — SIGNIFICANT CHANGE UP
EGFR: 29 ML/MIN/1.73M2 — LOW
EOSINOPHIL # BLD AUTO: 0.14 K/UL — SIGNIFICANT CHANGE UP (ref 0–0.5)
EOSINOPHIL NFR BLD AUTO: 1.7 % — SIGNIFICANT CHANGE UP (ref 0–6)
GLUCOSE SERPL-MCNC: 117 MG/DL — HIGH (ref 70–99)
GLUCOSE UR QL: NEGATIVE MG/DL — SIGNIFICANT CHANGE UP
HCT VFR BLD CALC: 37.3 % — SIGNIFICANT CHANGE UP (ref 34.5–45)
HGB BLD-MCNC: 12.6 G/DL — SIGNIFICANT CHANGE UP (ref 11.5–15.5)
IANC: 4.74 K/UL — SIGNIFICANT CHANGE UP (ref 1.8–7.4)
IMM GRANULOCYTES NFR BLD AUTO: 0.5 % — SIGNIFICANT CHANGE UP (ref 0–0.9)
KETONES UR-MCNC: NEGATIVE MG/DL — SIGNIFICANT CHANGE UP
LEUKOCYTE ESTERASE UR-ACNC: ABNORMAL
LYMPHOCYTES # BLD AUTO: 2.55 K/UL — SIGNIFICANT CHANGE UP (ref 1–3.3)
LYMPHOCYTES # BLD AUTO: 30.2 % — SIGNIFICANT CHANGE UP (ref 13–44)
MCHC RBC-ENTMCNC: 33 PG — SIGNIFICANT CHANGE UP (ref 27–34)
MCHC RBC-ENTMCNC: 33.8 G/DL — SIGNIFICANT CHANGE UP (ref 32–36)
MCV RBC AUTO: 97.6 FL — SIGNIFICANT CHANGE UP (ref 80–100)
MONOCYTES # BLD AUTO: 0.95 K/UL — HIGH (ref 0–0.9)
MONOCYTES NFR BLD AUTO: 11.3 % — SIGNIFICANT CHANGE UP (ref 2–14)
NEUTROPHILS # BLD AUTO: 4.74 K/UL — SIGNIFICANT CHANGE UP (ref 1.8–7.4)
NEUTROPHILS NFR BLD AUTO: 56.1 % — SIGNIFICANT CHANGE UP (ref 43–77)
NITRITE UR-MCNC: NEGATIVE — SIGNIFICANT CHANGE UP
NRBC # BLD: 0 /100 WBCS — SIGNIFICANT CHANGE UP (ref 0–0)
NRBC # FLD: 0 K/UL — SIGNIFICANT CHANGE UP (ref 0–0)
PH UR: 6 — SIGNIFICANT CHANGE UP (ref 5–8)
PLATELET # BLD AUTO: 359 K/UL — SIGNIFICANT CHANGE UP (ref 150–400)
POTASSIUM SERPL-MCNC: 5 MMOL/L — SIGNIFICANT CHANGE UP (ref 3.5–5.3)
POTASSIUM SERPL-MCNC: SIGNIFICANT CHANGE UP MMOL/L (ref 3.5–5.3)
POTASSIUM SERPL-SCNC: 5 MMOL/L — SIGNIFICANT CHANGE UP (ref 3.5–5.3)
POTASSIUM SERPL-SCNC: SIGNIFICANT CHANGE UP MMOL/L (ref 3.5–5.3)
PROT SERPL-MCNC: 6.9 G/DL — SIGNIFICANT CHANGE UP (ref 6–8.3)
PROT UR-MCNC: SIGNIFICANT CHANGE UP MG/DL
RBC # BLD: 3.82 M/UL — SIGNIFICANT CHANGE UP (ref 3.8–5.2)
RBC # FLD: 14.3 % — SIGNIFICANT CHANGE UP (ref 10.3–14.5)
RBC CASTS # UR COMP ASSIST: 0 /HPF — SIGNIFICANT CHANGE UP (ref 0–4)
SODIUM SERPL-SCNC: 130 MMOL/L — LOW (ref 135–145)
SP GR SPEC: 1.01 — SIGNIFICANT CHANGE UP (ref 1–1.03)
SQUAMOUS # UR AUTO: 2 /HPF — SIGNIFICANT CHANGE UP (ref 0–5)
UROBILINOGEN FLD QL: 0.2 MG/DL — SIGNIFICANT CHANGE UP (ref 0.2–1)
WBC # BLD: 8.44 K/UL — SIGNIFICANT CHANGE UP (ref 3.8–10.5)
WBC # FLD AUTO: 8.44 K/UL — SIGNIFICANT CHANGE UP (ref 3.8–10.5)
WBC UR QL: 8 /HPF — HIGH (ref 0–5)

## 2025-01-29 PROCEDURE — 99285 EMERGENCY DEPT VISIT HI MDM: CPT

## 2025-01-29 PROCEDURE — 71046 X-RAY EXAM CHEST 2 VIEWS: CPT | Mod: 26

## 2025-01-29 PROCEDURE — 74176 CT ABD & PELVIS W/O CONTRAST: CPT | Mod: 26

## 2025-01-29 PROCEDURE — 76770 US EXAM ABDO BACK WALL COMP: CPT | Mod: 26

## 2025-01-29 RX ORDER — IPRATROPIUM BROMIDE AND ALBUTEROL SULFATE .5; 2.5 MG/3ML; MG/3ML
3 SOLUTION RESPIRATORY (INHALATION) ONCE
Refills: 0 | Status: COMPLETED | OUTPATIENT
Start: 2025-01-29 | End: 2025-01-29

## 2025-01-29 RX ORDER — PREDNISONE 5 MG
40 TABLET ORAL ONCE
Refills: 0 | Status: COMPLETED | OUTPATIENT
Start: 2025-01-29 | End: 2025-01-29

## 2025-01-29 RX ORDER — LORAZEPAM 1 MG/1
1 TABLET ORAL ONCE
Refills: 0 | Status: DISCONTINUED | OUTPATIENT
Start: 2025-01-29 | End: 2025-01-29

## 2025-01-29 RX ORDER — CLONAZEPAM 2 MG
0.5 TABLET ORAL ONCE
Refills: 0 | Status: DISCONTINUED | OUTPATIENT
Start: 2025-01-29 | End: 2025-01-29

## 2025-01-29 RX ADMIN — LORAZEPAM 1 MILLIGRAM(S): 1 TABLET ORAL at 22:54

## 2025-01-29 RX ADMIN — IPRATROPIUM BROMIDE AND ALBUTEROL SULFATE 3 MILLILITER(S): .5; 2.5 SOLUTION RESPIRATORY (INHALATION) at 11:26

## 2025-01-29 RX ADMIN — Medication 40 MILLIGRAM(S): at 11:26

## 2025-01-29 NOTE — ED PROVIDER NOTE - ATTENDING APP SHARED VISIT CONTRIBUTION OF CARE
84-year-old female past medical history of dementia baseline A&O x 2, COPD, CHF, hypertension, bladder cancer status post resection and chemo, breast cancer status postlumpectomy brought in by home health care aide for concern for difficulty urinating.  Per aide, she has not seen her urinate in about 12 hours.  Patient with recent admission for similar symptoms, found to be hyponatremic had Hernandez catheter placed.  Per aide at bedside patient has also had  coughing sometimes productive and wheezing since discharge.  On exam patient is well-appearing, ANO x 2 (does not know year which is baseline) abdomen soft and nontender, no CVA tenderness, diffusely diminished breath sounds, no acute respiratory distress, nonlabored respirations, no hypoxia.  Will treat for mild COPD exacerbation, x-ray chest to evaluate for pneumonia, DuoNebs and steroids.  Will check basic labs to evaluate renal function as well as sodium.  Bladder scan to check for bladder volume to assess for retention, UA to evaluate for infection.  Dispo and further interventions pending

## 2025-01-29 NOTE — PROVIDER CONTACT NOTE (OTHER) - ASSESSMENT
Pt is returning home-pt has dementia; needs BLS ambulance to return home.  Daughter confirmed to be home and will accept pt when she returns.

## 2025-01-29 NOTE — ED PROVIDER NOTE - CLINICAL SUMMARY MEDICAL DECISION MAKING FREE TEXT BOX
85 y/o F with pmh of dementia (A&Ox2 person/place), COPD, CHF, HTN, anxiety, bladder ca s/p resection/chemo, breast cancer s/p lumpectomy, brought in by her HHA for concern for trouble urinating over past 24 hours. states she has not really urinated in the past 2 days.  recent admission for decreased urinary output 1/12/25, and was found to have RACHAEL, and hyponatremia requiring admission.  + cough, wheezes/crackles, will also CXR to r/o PNA    will check labs to r/o RACHAEL, metabolic derangements.   US Kidney / bladder   reassess

## 2025-01-29 NOTE — ED ADULT NURSE REASSESSMENT NOTE - NS ED NURSE REASSESS COMMENT FT1
Report received from day JENELLE Encarnacion. Pt confused, baseline mental status, Macedonian speaking. Pt noted with aguayo. Pt respirations even and unlabored, chest rise and fall equal b/l. Pt with no acute distres. Stretcher in lowest position, pt safety maintained.

## 2025-01-29 NOTE — ED PROVIDER NOTE - PHYSICAL EXAMINATION
CONSTITUTIONAL: Well-appearing; well-nourished; in no apparent distress;  HEAD: Normocephalic, atraumatic;  NECK/LYMPH: Supple; non-tender;  CARD: Normal S1, S2; no murmurs, rubs, or gallops noted  RESP: Normal chest excursion with respiration; breath sounds clear and equal bilaterally; + diffuse end expiratory wheezes and faint crackles at bases b/l.  ABD/GI: soft, non-distended; non-tender; no palpable organomegaly, no pulsatile mass  EXT/MS: moves all extremities; distal pulses are normal, no pedal edema  SKIN: Normal for age and race; warm; dry; good turgor; no apparent lesions or exudate noted  NEURO: Awake, alert, oriented x 2, no gross deficits, CN II-XII grossly intact, no motor or sensory deficit noted  PSYCH: Normal mood; appropriate affect

## 2025-01-29 NOTE — ED ADULT NURSE NOTE - OBJECTIVE STATEMENT
This is a 85 y/o female Croatian speaking , with daughter to translate. Patient has a medical history of HTN, COPD. Presented today due to urinary retention, as per daughter mother is normally confuse,  has the urgency to void, but unable to since last night. Patient appears well. Breathing is even and unlabored, abdomen is soft and non tender. IV initiated on left AC g 20 blood work sent to lab. Patient taken for ultrasound per stretcher. Bed in lowest position, side rails up.

## 2025-01-29 NOTE — ED ADULT NURSE NOTE - CHIEF COMPLAINT QUOTE
pt brought in by EMS from home Croatian speaking with aide translating complaining of decrease urinary output x2 days. pt denies any other complaints at this time

## 2025-01-29 NOTE — ED PROVIDER NOTE - NSFOLLOWUPINSTRUCTIONS_ED_ALL_ED_FT
Today you were seen in the ED for evaluation of your symptoms.     A copy of your results are attached in this document below.     We recommend following up with your primary care provider within the next 7 days.     Today in the emergency department we placed a Hernandez to help with you retaining urine.     Please keep this in place until you see your primary care doctor.     SEEK IMMEDIATE MEDICAL CARE IF YOU HAVE ANY OF THE FOLLOWING SYMPTOMS: severe chest pain, difficulty breathing, fainting, fevers that persist despite taking medications over the counter, vomiting blood, dark or bloody stools, inability to tolerate eating and drinking food by mouth

## 2025-01-29 NOTE — ED PROVIDER NOTE - OBJECTIVE STATEMENT
83 y/o F with pmh of dementia (A&Ox2 person/place), COPD, CHF, HTN, anxiety, bladder ca s/p resection/chemo, breast cancer s/p lumpectomy, brought in by her HHA for concern for trouble urinating over past 24 hours. states she has not really urinated in the past 2 days. states the patient tells her she is going but does not see any urine in the toilet. had recently been admitted for decreased urinary output 1/12/25, and was found to have RACHAEL, and hyponatremia requiring admission. pt denies any n/v/d/c, fevers. does admit to increased cough w/ productive sputum over past few days. no shortness of breath, chest pain, fevers or known sick contacts.

## 2025-01-29 NOTE — ED PROVIDER NOTE - PATIENT PORTAL LINK FT
You can access the FollowMyHealth Patient Portal offered by [facility] by registering at the following website: [email or web address]. By joining [facility]’s FollowMyHealth portal, you will also be able to view your health information using other applications (apps) compatible with our system.

## 2025-01-29 NOTE — ED PROVIDER NOTE - PROGRESS NOTE DETAILS
spoke w/ daughter kim, [phone], states unable to care for her tonight. would like her to go to rehab. she states her mother cannot take care of this on her own with her dementia and no 24/7 HHA. [facility] PGY-2: received signed out on patient pending admission. daughter called back to the ED stating she can be home for the patient, and would be available if we can arrange for ambulette home.

## 2025-01-29 NOTE — ED ADULT TRIAGE NOTE - CHIEF COMPLAINT QUOTE
pt brought in by EMS from home Greek speaking with aide translating complaining of decrease urinary output x2 days. pt denies any other complaints at this time
sensory intact

## 2025-01-30 ENCOUNTER — APPOINTMENT (OUTPATIENT)
Dept: HOME HEALTH SERVICES | Facility: HOME HEALTH | Age: 85
End: 2025-01-30

## 2025-01-30 ENCOUNTER — APPOINTMENT (OUTPATIENT)
Dept: HOME HEALTH SERVICES | Facility: HOME HEALTH | Age: 85
End: 2025-01-30
Payer: MEDICARE

## 2025-01-30 ENCOUNTER — NON-APPOINTMENT (OUTPATIENT)
Age: 85
End: 2025-01-30

## 2025-01-30 VITALS
RESPIRATION RATE: 16 BRPM | HEART RATE: 66 BPM | DIASTOLIC BLOOD PRESSURE: 81 MMHG | SYSTOLIC BLOOD PRESSURE: 142 MMHG | OXYGEN SATURATION: 96 % | TEMPERATURE: 98 F

## 2025-01-30 VITALS
DIASTOLIC BLOOD PRESSURE: 81 MMHG | TEMPERATURE: 98 F | RESPIRATION RATE: 16 BRPM | SYSTOLIC BLOOD PRESSURE: 142 MMHG | OXYGEN SATURATION: 96 % | HEART RATE: 66 BPM

## 2025-01-30 DIAGNOSIS — Z97.8 PRESENCE OF OTHER SPECIFIED DEVICES: ICD-10-CM

## 2025-01-30 DIAGNOSIS — R26.81 UNSTEADINESS ON FEET: ICD-10-CM

## 2025-01-30 DIAGNOSIS — F03.90 UNSPECIFIED DEMENTIA W/OUT BEHAVIORAL DISTURBANCE: ICD-10-CM

## 2025-01-30 DIAGNOSIS — J44.9 CHRONIC OBSTRUCTIVE PULMONARY DISEASE, UNSPECIFIED: ICD-10-CM

## 2025-01-30 DIAGNOSIS — R33.9 RETENTION OF URINE, UNSPECIFIED: ICD-10-CM

## 2025-01-30 DIAGNOSIS — E87.1 HYPO-OSMOLALITY AND HYPONATREMIA: ICD-10-CM

## 2025-01-30 DIAGNOSIS — I10 ESSENTIAL (PRIMARY) HYPERTENSION: ICD-10-CM

## 2025-01-30 PROCEDURE — 99495 TRANSJ CARE MGMT MOD F2F 14D: CPT | Mod: 2W

## 2025-02-04 ENCOUNTER — LABORATORY RESULT (OUTPATIENT)
Age: 85
End: 2025-02-04

## 2025-02-05 DIAGNOSIS — E87.5 HYPERKALEMIA: ICD-10-CM

## 2025-02-05 RX ORDER — SODIUM ZIRCONIUM CYCLOSILICATE 10 G/10G
10 POWDER, FOR SUSPENSION ORAL
Qty: 9 | Refills: 1 | Status: ACTIVE | COMMUNITY
Start: 2025-02-05 | End: 1900-01-01

## 2025-02-07 ENCOUNTER — LABORATORY RESULT (OUTPATIENT)
Age: 85
End: 2025-02-07

## 2025-02-08 ENCOUNTER — NON-APPOINTMENT (OUTPATIENT)
Age: 85
End: 2025-02-08

## 2025-02-14 ENCOUNTER — NON-APPOINTMENT (OUTPATIENT)
Age: 85
End: 2025-02-14

## 2025-02-14 DIAGNOSIS — N18.30 TYPE 2 DIABETES MELLITUS WITH DIABETIC CHRONIC KIDNEY DISEASE: ICD-10-CM

## 2025-02-14 DIAGNOSIS — E11.22 TYPE 2 DIABETES MELLITUS WITH DIABETIC CHRONIC KIDNEY DISEASE: ICD-10-CM

## 2025-03-11 NOTE — PHYSICAL THERAPY INITIAL EVALUATION ADULT - FUNCTIONAL LIMITATIONS, PT EVAL
[facility] , signed by Provider , Approved status.     Faxed to [facility] -Done   
self-care/home management

## 2025-03-18 ENCOUNTER — APPOINTMENT (OUTPATIENT)
Dept: HOME HEALTH SERVICES | Facility: HOME HEALTH | Age: 85
End: 2025-03-18

## 2025-03-31 ENCOUNTER — TRANSCRIPTION ENCOUNTER (OUTPATIENT)
Age: 85
End: 2025-03-31

## 2025-03-31 ENCOUNTER — NON-APPOINTMENT (OUTPATIENT)
Age: 85
End: 2025-03-31

## 2025-03-31 DIAGNOSIS — Z97.8 PRESENCE OF OTHER SPECIFIED DEVICES: ICD-10-CM

## 2025-03-31 DIAGNOSIS — R39.9 UNSPECIFIED SYMPTOMS AND SIGNS INVOLVING THE GENITOURINARY SYSTEM: ICD-10-CM

## 2025-03-31 RX ORDER — ALBUTEROL SULFATE 90 UG/1
108 (90 BASE) INHALANT RESPIRATORY (INHALATION)
Qty: 1 | Refills: 11 | Status: ACTIVE | COMMUNITY
Start: 2025-03-31 | End: 1900-01-01

## 2025-04-01 ENCOUNTER — LABORATORY RESULT (OUTPATIENT)
Age: 85
End: 2025-04-01

## 2025-06-05 ENCOUNTER — APPOINTMENT (OUTPATIENT)
Dept: HOME HEALTH SERVICES | Facility: HOME HEALTH | Age: 85
End: 2025-06-05
Payer: MEDICARE

## 2025-06-05 ENCOUNTER — APPOINTMENT (OUTPATIENT)
Dept: HOME HEALTH SERVICES | Facility: HOME HEALTH | Age: 85
End: 2025-06-05

## 2025-06-05 DIAGNOSIS — F32.A DEPRESSION, UNSPECIFIED: ICD-10-CM

## 2025-06-05 DIAGNOSIS — E87.5 HYPERKALEMIA: ICD-10-CM

## 2025-06-05 DIAGNOSIS — N18.4 CHRONIC KIDNEY DISEASE, STAGE 4 (SEVERE): ICD-10-CM

## 2025-06-05 DIAGNOSIS — J44.9 CHRONIC OBSTRUCTIVE PULMONARY DISEASE, UNSPECIFIED: ICD-10-CM

## 2025-06-05 DIAGNOSIS — H40.9 UNSPECIFIED GLAUCOMA: ICD-10-CM

## 2025-06-05 DIAGNOSIS — F03.90 UNSPECIFIED DEMENTIA W/OUT BEHAVIORAL DISTURBANCE: ICD-10-CM

## 2025-06-05 DIAGNOSIS — R33.9 RETENTION OF URINE, UNSPECIFIED: ICD-10-CM

## 2025-06-05 DIAGNOSIS — Z71.89 OTHER SPECIFIED COUNSELING: ICD-10-CM

## 2025-06-05 DIAGNOSIS — R26.81 UNSTEADINESS ON FEET: ICD-10-CM

## 2025-06-05 DIAGNOSIS — Z97.8 PRESENCE OF OTHER SPECIFIED DEVICES: ICD-10-CM

## 2025-06-05 DIAGNOSIS — N18.30 TYPE 2 DIABETES MELLITUS WITH DIABETIC CHRONIC KIDNEY DISEASE: ICD-10-CM

## 2025-06-05 DIAGNOSIS — I10 ESSENTIAL (PRIMARY) HYPERTENSION: ICD-10-CM

## 2025-06-05 DIAGNOSIS — R42 DIZZINESS AND GIDDINESS: ICD-10-CM

## 2025-06-05 DIAGNOSIS — E11.22 TYPE 2 DIABETES MELLITUS WITH DIABETIC CHRONIC KIDNEY DISEASE: ICD-10-CM

## 2025-06-05 DIAGNOSIS — R39.9 UNSPECIFIED SYMPTOMS AND SIGNS INVOLVING THE GENITOURINARY SYSTEM: ICD-10-CM

## 2025-06-05 DIAGNOSIS — F41.9 ANXIETY DISORDER, UNSPECIFIED: ICD-10-CM

## 2025-06-05 PROCEDURE — 99350 HOME/RES VST EST HIGH MDM 60: CPT | Mod: 25,2W

## 2025-06-05 PROCEDURE — 99497 ADVNCD CARE PLAN 30 MIN: CPT | Mod: 2W

## 2025-06-05 RX ORDER — UMECLIDINIUM 62.5 UG/1
62.5 AEROSOL, POWDER ORAL DAILY
Qty: 1 | Refills: 0 | Status: ACTIVE | COMMUNITY
Start: 2025-06-05 | End: 1900-01-01

## 2025-06-06 ENCOUNTER — NON-APPOINTMENT (OUTPATIENT)
Age: 85
End: 2025-06-06

## 2025-06-06 RX ORDER — TIOTROPIUM BROMIDE INHALATION SPRAY 1.56 UG/1
1.25 SPRAY, METERED RESPIRATORY (INHALATION) DAILY
Qty: 1 | Refills: 3 | Status: ACTIVE | COMMUNITY
Start: 2025-06-06 | End: 1900-01-01

## 2025-07-08 ENCOUNTER — RX RENEWAL (OUTPATIENT)
Age: 85
End: 2025-07-08

## 2025-09-09 ENCOUNTER — APPOINTMENT (OUTPATIENT)
Dept: HOME HEALTH SERVICES | Facility: HOME HEALTH | Age: 85
End: 2025-09-09